# Patient Record
Sex: FEMALE | Race: WHITE | URBAN - METROPOLITAN AREA
[De-identification: names, ages, dates, MRNs, and addresses within clinical notes are randomized per-mention and may not be internally consistent; named-entity substitution may affect disease eponyms.]

---

## 2017-05-02 ENCOUNTER — OUTPATIENT (OUTPATIENT)
Dept: OUTPATIENT SERVICES | Facility: HOSPITAL | Age: 37
LOS: 1 days | Discharge: HOME | End: 2017-05-02

## 2017-06-18 ENCOUNTER — EMERGENCY (EMERGENCY)
Facility: HOSPITAL | Age: 37
LOS: 0 days | Discharge: HOME | End: 2017-06-18

## 2017-06-29 DIAGNOSIS — F41.9 ANXIETY DISORDER, UNSPECIFIED: ICD-10-CM

## 2017-06-29 DIAGNOSIS — Z79.899 OTHER LONG TERM (CURRENT) DRUG THERAPY: ICD-10-CM

## 2017-06-29 DIAGNOSIS — J45.909 UNSPECIFIED ASTHMA, UNCOMPLICATED: ICD-10-CM

## 2017-06-29 DIAGNOSIS — Z98.890 OTHER SPECIFIED POSTPROCEDURAL STATES: ICD-10-CM

## 2017-06-29 DIAGNOSIS — R23.8 OTHER SKIN CHANGES: ICD-10-CM

## 2017-06-29 DIAGNOSIS — L73.9 FOLLICULAR DISORDER, UNSPECIFIED: ICD-10-CM

## 2017-07-12 ENCOUNTER — EMERGENCY (EMERGENCY)
Facility: HOSPITAL | Age: 37
LOS: 0 days | Discharge: HOME | End: 2017-07-12

## 2017-07-12 DIAGNOSIS — F31.9 BIPOLAR DISORDER, UNSPECIFIED: ICD-10-CM

## 2017-07-12 DIAGNOSIS — J45.909 UNSPECIFIED ASTHMA, UNCOMPLICATED: ICD-10-CM
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
## 2017-07-12 DIAGNOSIS — Z87.891 PERSONAL HISTORY OF NICOTINE DEPENDENCE: ICD-10-CM
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
## 2017-07-12 DIAGNOSIS — S42.251A DISPLACED FRACTURE OF GREATER TUBEROSITY OF RIGHT HUMERUS, INITIAL ENCOUNTER FOR CLOSED FRACTURE: ICD-10-CM
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
## 2017-07-12 DIAGNOSIS — Z98.891 HISTORY OF UTERINE SCAR FROM PREVIOUS SURGERY: ICD-10-CM

## 2017-07-24 ENCOUNTER — OUTPATIENT (OUTPATIENT)
Dept: OUTPATIENT SERVICES | Facility: HOSPITAL | Age: 37
LOS: 1 days | Discharge: HOME | End: 2017-07-24

## 2017-07-24 DIAGNOSIS — F31.81 BIPOLAR II DISORDER: ICD-10-CM

## 2017-08-10 ENCOUNTER — OUTPATIENT (OUTPATIENT)
Dept: OUTPATIENT SERVICES | Facility: HOSPITAL | Age: 37
LOS: 1 days | Discharge: HOME | End: 2017-08-10

## 2017-08-10 DIAGNOSIS — F31.81 BIPOLAR II DISORDER: ICD-10-CM

## 2017-09-13 ENCOUNTER — OUTPATIENT (OUTPATIENT)
Dept: OUTPATIENT SERVICES | Facility: HOSPITAL | Age: 37
LOS: 1 days | Discharge: HOME | End: 2017-09-13

## 2017-09-13 DIAGNOSIS — F31.81 BIPOLAR II DISORDER: ICD-10-CM

## 2017-10-16 ENCOUNTER — EMERGENCY (EMERGENCY)
Facility: HOSPITAL | Age: 37
LOS: 0 days | Discharge: HOME | End: 2017-10-16

## 2017-10-16 DIAGNOSIS — N94.9 UNSPECIFIED CONDITION ASSOCIATED WITH FEMALE GENITAL ORGANS AND MENSTRUAL CYCLE: ICD-10-CM

## 2017-10-16 DIAGNOSIS — R50.9 FEVER, UNSPECIFIED: ICD-10-CM

## 2017-10-16 DIAGNOSIS — F31.9 BIPOLAR DISORDER, UNSPECIFIED: ICD-10-CM

## 2017-10-16 DIAGNOSIS — Z98.890 OTHER SPECIFIED POSTPROCEDURAL STATES: ICD-10-CM

## 2017-10-16 DIAGNOSIS — N39.0 URINARY TRACT INFECTION, SITE NOT SPECIFIED: ICD-10-CM

## 2017-10-16 DIAGNOSIS — J45.909 UNSPECIFIED ASTHMA, UNCOMPLICATED: ICD-10-CM

## 2017-10-16 DIAGNOSIS — Z79.899 OTHER LONG TERM (CURRENT) DRUG THERAPY: ICD-10-CM

## 2017-10-30 ENCOUNTER — OUTPATIENT (OUTPATIENT)
Dept: OUTPATIENT SERVICES | Facility: HOSPITAL | Age: 37
LOS: 1 days | Discharge: HOME | End: 2017-10-30

## 2017-10-30 DIAGNOSIS — F31.81 BIPOLAR II DISORDER: ICD-10-CM

## 2017-11-16 ENCOUNTER — OUTPATIENT (OUTPATIENT)
Dept: OUTPATIENT SERVICES | Facility: HOSPITAL | Age: 37
LOS: 1 days | Discharge: HOME | End: 2017-11-16

## 2017-11-16 DIAGNOSIS — F31.81 BIPOLAR II DISORDER: ICD-10-CM

## 2017-12-14 ENCOUNTER — OUTPATIENT (OUTPATIENT)
Dept: OUTPATIENT SERVICES | Facility: HOSPITAL | Age: 37
LOS: 1 days | Discharge: HOME | End: 2017-12-14

## 2017-12-14 DIAGNOSIS — F31.81 BIPOLAR II DISORDER: ICD-10-CM

## 2018-02-05 ENCOUNTER — OUTPATIENT (OUTPATIENT)
Dept: OUTPATIENT SERVICES | Facility: HOSPITAL | Age: 38
LOS: 1 days | Discharge: HOME | End: 2018-02-05

## 2018-02-05 DIAGNOSIS — F31.81 BIPOLAR II DISORDER: ICD-10-CM

## 2018-03-15 ENCOUNTER — OUTPATIENT (OUTPATIENT)
Dept: OUTPATIENT SERVICES | Facility: HOSPITAL | Age: 38
LOS: 1 days | Discharge: HOME | End: 2018-03-15

## 2018-03-15 DIAGNOSIS — F31.81 BIPOLAR II DISORDER: ICD-10-CM

## 2018-03-16 LAB
ALBUMIN SERPL ELPH-MCNC: 4.8 G/DL — SIGNIFICANT CHANGE UP (ref 3.5–5.2)
ALP SERPL-CCNC: 59 U/L — SIGNIFICANT CHANGE UP (ref 30–115)
ALT FLD-CCNC: 14 U/L — SIGNIFICANT CHANGE UP (ref 0–41)
ANION GAP SERPL CALC-SCNC: 14 MMOL/L — SIGNIFICANT CHANGE UP (ref 7–14)
AST SERPL-CCNC: 18 U/L — SIGNIFICANT CHANGE UP (ref 0–41)
BASOPHILS # BLD AUTO: 0.04 K/UL — SIGNIFICANT CHANGE UP (ref 0–0.2)
BASOPHILS NFR BLD AUTO: 0.6 % — SIGNIFICANT CHANGE UP (ref 0–1)
BILIRUB SERPL-MCNC: 0.3 MG/DL — SIGNIFICANT CHANGE UP (ref 0.2–1.2)
BUN SERPL-MCNC: 19 MG/DL — SIGNIFICANT CHANGE UP (ref 10–20)
CALCIUM SERPL-MCNC: 9.1 MG/DL — SIGNIFICANT CHANGE UP (ref 8.5–10.1)
CHLORIDE SERPL-SCNC: 95 MMOL/L — LOW (ref 98–110)
CHOLEST SERPL-MCNC: 202 MG/DL — HIGH (ref 100–200)
CO2 SERPL-SCNC: 27 MMOL/L — SIGNIFICANT CHANGE UP (ref 17–32)
CREAT SERPL-MCNC: 0.8 MG/DL — SIGNIFICANT CHANGE UP (ref 0.7–1.5)
EOSINOPHIL # BLD AUTO: 0.05 K/UL — SIGNIFICANT CHANGE UP (ref 0–0.7)
EOSINOPHIL NFR BLD AUTO: 0.7 % — SIGNIFICANT CHANGE UP (ref 0–8)
ESTIMATED AVERAGE GLUCOSE: 100 MG/DL — SIGNIFICANT CHANGE UP (ref 68–114)
GLUCOSE SERPL-MCNC: 83 MG/DL — SIGNIFICANT CHANGE UP (ref 70–110)
HBA1C BLD-MCNC: 5.1 % — SIGNIFICANT CHANGE UP (ref 4–5.6)
HCT VFR BLD CALC: 40 % — SIGNIFICANT CHANGE UP (ref 37–47)
HDLC SERPL-MCNC: 89 MG/DL — SIGNIFICANT CHANGE UP (ref 40–125)
HGB BLD-MCNC: 13.4 G/DL — SIGNIFICANT CHANGE UP (ref 12–16)
IMM GRANULOCYTES NFR BLD AUTO: 0.4 % — HIGH (ref 0.1–0.3)
LIPID PNL WITH DIRECT LDL SERPL: 103 MG/DL — SIGNIFICANT CHANGE UP (ref 4–129)
LYMPHOCYTES # BLD AUTO: 1.65 K/UL — SIGNIFICANT CHANGE UP (ref 1.2–3.4)
LYMPHOCYTES # BLD AUTO: 23.5 % — SIGNIFICANT CHANGE UP (ref 20.5–51.1)
MCHC RBC-ENTMCNC: 30.8 PG — SIGNIFICANT CHANGE UP (ref 27–31)
MCHC RBC-ENTMCNC: 33.5 G/DL — SIGNIFICANT CHANGE UP (ref 32–37)
MCV RBC AUTO: 92 FL — SIGNIFICANT CHANGE UP (ref 81–99)
MONOCYTES # BLD AUTO: 0.46 K/UL — SIGNIFICANT CHANGE UP (ref 0.1–0.6)
MONOCYTES NFR BLD AUTO: 6.6 % — SIGNIFICANT CHANGE UP (ref 1.7–9.3)
NEUTROPHILS # BLD AUTO: 4.79 K/UL — SIGNIFICANT CHANGE UP (ref 1.4–6.5)
NEUTROPHILS NFR BLD AUTO: 68.2 % — SIGNIFICANT CHANGE UP (ref 42.2–75.2)
NRBC # BLD: 0 /100 WBCS — SIGNIFICANT CHANGE UP (ref 0–0)
PLATELET # BLD AUTO: 220 K/UL — SIGNIFICANT CHANGE UP (ref 130–400)
POTASSIUM SERPL-MCNC: 4.3 MMOL/L — SIGNIFICANT CHANGE UP (ref 3.5–5)
POTASSIUM SERPL-SCNC: 4.3 MMOL/L — SIGNIFICANT CHANGE UP (ref 3.5–5)
PROT SERPL-MCNC: 7.2 G/DL — SIGNIFICANT CHANGE UP (ref 6–8)
RBC # BLD: 4.35 M/UL — SIGNIFICANT CHANGE UP (ref 4.2–5.4)
RBC # FLD: 12.1 % — SIGNIFICANT CHANGE UP (ref 11.5–14.5)
SODIUM SERPL-SCNC: 136 MMOL/L — SIGNIFICANT CHANGE UP (ref 135–146)
TOTAL CHOLESTEROL/HDL RATIO MEASUREMENT: 2.3 RATIO — LOW (ref 4–5.5)
TRIGL SERPL-MCNC: 98 MG/DL — SIGNIFICANT CHANGE UP (ref 10–149)
VALPROATE SERPL-MCNC: 53 UG/ML — SIGNIFICANT CHANGE UP (ref 50–100)
WBC # BLD: 7.02 K/UL — SIGNIFICANT CHANGE UP (ref 4.8–10.8)
WBC # FLD AUTO: 7.02 K/UL — SIGNIFICANT CHANGE UP (ref 4.8–10.8)

## 2018-03-17 LAB — TSH SERPL-MCNC: 1.97 UIU/ML — SIGNIFICANT CHANGE UP (ref 0.27–4.2)

## 2018-04-13 ENCOUNTER — OUTPATIENT (OUTPATIENT)
Dept: OUTPATIENT SERVICES | Facility: HOSPITAL | Age: 38
LOS: 1 days | Discharge: HOME | End: 2018-04-13

## 2018-04-13 DIAGNOSIS — F31.81 BIPOLAR II DISORDER: ICD-10-CM

## 2018-05-07 ENCOUNTER — OUTPATIENT (OUTPATIENT)
Dept: OUTPATIENT SERVICES | Facility: HOSPITAL | Age: 38
LOS: 1 days | Discharge: HOME | End: 2018-05-07

## 2018-05-07 DIAGNOSIS — F31.81 BIPOLAR II DISORDER: ICD-10-CM

## 2018-05-09 ENCOUNTER — OUTPATIENT (OUTPATIENT)
Dept: OUTPATIENT SERVICES | Facility: HOSPITAL | Age: 38
LOS: 1 days | Discharge: HOME | End: 2018-05-09

## 2018-05-09 DIAGNOSIS — F31.81 BIPOLAR II DISORDER: ICD-10-CM

## 2018-06-11 ENCOUNTER — OUTPATIENT (OUTPATIENT)
Dept: OUTPATIENT SERVICES | Facility: HOSPITAL | Age: 38
LOS: 1 days | Discharge: HOME | End: 2018-06-11

## 2018-06-11 DIAGNOSIS — F31.81 BIPOLAR II DISORDER: ICD-10-CM

## 2018-06-12 ENCOUNTER — EMERGENCY (EMERGENCY)
Facility: HOSPITAL | Age: 38
LOS: 0 days | Discharge: HOME | End: 2018-06-12
Attending: EMERGENCY MEDICINE | Admitting: EMERGENCY MEDICINE

## 2018-06-12 VITALS
SYSTOLIC BLOOD PRESSURE: 99 MMHG | RESPIRATION RATE: 18 BRPM | TEMPERATURE: 98 F | HEART RATE: 114 BPM | DIASTOLIC BLOOD PRESSURE: 64 MMHG | OXYGEN SATURATION: 98 %

## 2018-06-12 VITALS
OXYGEN SATURATION: 98 % | HEART RATE: 83 BPM | RESPIRATION RATE: 18 BRPM | SYSTOLIC BLOOD PRESSURE: 118 MMHG | DIASTOLIC BLOOD PRESSURE: 88 MMHG | TEMPERATURE: 98 F
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
## 2018-06-12 DIAGNOSIS — F10.129 ALCOHOL ABUSE WITH INTOXICATION, UNSPECIFIED: ICD-10-CM
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
## 2018-06-12 DIAGNOSIS — F31.9 BIPOLAR DISORDER, UNSPECIFIED: ICD-10-CM

## 2018-06-12 LAB
ALBUMIN SERPL ELPH-MCNC: 4.4 G/DL — SIGNIFICANT CHANGE UP (ref 3.5–5.2)
ALP SERPL-CCNC: 53 U/L — SIGNIFICANT CHANGE UP (ref 30–115)
ALT FLD-CCNC: 20 U/L — SIGNIFICANT CHANGE UP (ref 0–41)
ANION GAP SERPL CALC-SCNC: 14 MMOL/L — SIGNIFICANT CHANGE UP (ref 7–14)
APAP SERPL-MCNC: <5 UG/ML — LOW (ref 10–30)
AST SERPL-CCNC: 19 U/L — SIGNIFICANT CHANGE UP (ref 0–41)
BASOPHILS # BLD AUTO: 0.03 K/UL — SIGNIFICANT CHANGE UP (ref 0–0.2)
BASOPHILS NFR BLD AUTO: 0.5 % — SIGNIFICANT CHANGE UP (ref 0–1)
BILIRUB SERPL-MCNC: <0.2 MG/DL — SIGNIFICANT CHANGE UP (ref 0.2–1.2)
BUN SERPL-MCNC: 13 MG/DL — SIGNIFICANT CHANGE UP (ref 10–20)
CALCIUM SERPL-MCNC: 9.2 MG/DL — SIGNIFICANT CHANGE UP (ref 8.5–10.1)
CHLORIDE SERPL-SCNC: 103 MMOL/L — SIGNIFICANT CHANGE UP (ref 98–110)
CK SERPL-CCNC: 129 U/L — SIGNIFICANT CHANGE UP (ref 0–225)
CO2 SERPL-SCNC: 26 MMOL/L — SIGNIFICANT CHANGE UP (ref 17–32)
CREAT SERPL-MCNC: 0.9 MG/DL — SIGNIFICANT CHANGE UP (ref 0.7–1.5)
EOSINOPHIL # BLD AUTO: 0.08 K/UL — SIGNIFICANT CHANGE UP (ref 0–0.7)
EOSINOPHIL NFR BLD AUTO: 1.2 % — SIGNIFICANT CHANGE UP (ref 0–8)
ETHANOL SERPL-MCNC: 100 MG/DL — HIGH
GLUCOSE SERPL-MCNC: 87 MG/DL — SIGNIFICANT CHANGE UP (ref 70–99)
HCG SERPL-ACNC: <0.6 MIU/ML — SIGNIFICANT CHANGE UP (ref 0–5)
HCT VFR BLD CALC: 38.3 % — SIGNIFICANT CHANGE UP (ref 37–47)
HGB BLD-MCNC: 13.4 G/DL — SIGNIFICANT CHANGE UP (ref 12–16)
IMM GRANULOCYTES NFR BLD AUTO: 0.6 % — HIGH (ref 0.1–0.3)
LITHIUM SERPL-MCNC: 0.18 MMOL/L — LOW (ref 0.6–1.2)
LYMPHOCYTES # BLD AUTO: 1.78 K/UL — SIGNIFICANT CHANGE UP (ref 1.2–3.4)
LYMPHOCYTES # BLD AUTO: 26.8 % — SIGNIFICANT CHANGE UP (ref 20.5–51.1)
MCHC RBC-ENTMCNC: 31.7 PG — HIGH (ref 27–31)
MCHC RBC-ENTMCNC: 35 G/DL — SIGNIFICANT CHANGE UP (ref 32–37)
MCV RBC AUTO: 90.5 FL — SIGNIFICANT CHANGE UP (ref 81–99)
MONOCYTES # BLD AUTO: 0.58 K/UL — SIGNIFICANT CHANGE UP (ref 0.1–0.6)
MONOCYTES NFR BLD AUTO: 8.7 % — SIGNIFICANT CHANGE UP (ref 1.7–9.3)
NEUTROPHILS # BLD AUTO: 4.12 K/UL — SIGNIFICANT CHANGE UP (ref 1.4–6.5)
NEUTROPHILS NFR BLD AUTO: 62.2 % — SIGNIFICANT CHANGE UP (ref 42.2–75.2)
NRBC # BLD: 0 /100 WBCS — SIGNIFICANT CHANGE UP (ref 0–0)
PLATELET # BLD AUTO: 236 K/UL — SIGNIFICANT CHANGE UP (ref 130–400)
POTASSIUM SERPL-MCNC: 3.9 MMOL/L — SIGNIFICANT CHANGE UP (ref 3.5–5)
POTASSIUM SERPL-SCNC: 3.9 MMOL/L — SIGNIFICANT CHANGE UP (ref 3.5–5)
PROT SERPL-MCNC: 7.2 G/DL — SIGNIFICANT CHANGE UP (ref 6–8)
RBC # BLD: 4.23 M/UL — SIGNIFICANT CHANGE UP (ref 4.2–5.4)
RBC # FLD: 12 % — SIGNIFICANT CHANGE UP (ref 11.5–14.5)
SALICYLATES SERPL-MCNC: <0.3 MG/DL — LOW (ref 4–30)
SODIUM SERPL-SCNC: 143 MMOL/L — SIGNIFICANT CHANGE UP (ref 135–146)
TROPONIN T SERPL-MCNC: <0.01 NG/ML — SIGNIFICANT CHANGE UP
WBC # BLD: 6.63 K/UL — SIGNIFICANT CHANGE UP (ref 4.8–10.8)
WBC # FLD AUTO: 6.63 K/UL — SIGNIFICANT CHANGE UP (ref 4.8–10.8)

## 2018-06-12 NOTE — ED PROVIDER NOTE - PHYSICAL EXAMINATION
VITAL SIGNS: I have reviewed nursing notes and confirm.  CONSTITUTIONAL: Well-developed;   SKIN: skin exam is warm and dry, no acute rash.   HEAD: Normocephalic; atraumatic.  EYES:  EOM intact; conjunctiva and sclera clear.  ENT: No nasal discharge; airway clear. moist oral mucosa;   NECK: Supple; non tender.  CARD: S1, S2 normal; no murmurs, gallops, or rubs. Regular rate and rhythm. posterior tibial and radial pulses 2+  RESP: No wheezes, rales or rhonchi. cta b/l. no use of accessory muscles. no retractions  ABD: Normal bowel sounds; soft; non-distended; non-tender;   EXT: Normal ROM. No  cyanosis or edema.  NEURO: Alert-- wakes up-- answer-- pt intoxicated
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
## 2018-06-12 NOTE — ED PROVIDER NOTE - NS ED ROS FT
ROS: No fever/chills, No headache/photophobia/eye pain/changes in vision, No ear pain/sore throat/dysphagia, No chest pain/palpitations, no SOB/cough/wheeze/stridor, No abdominal pain, No N/V/D/melena, no dysuria/frequency/discharge, No neck/back pain, no rash, no changes in neurological status/function.    -limited by pt intoxication
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
## 2018-06-14 ENCOUNTER — EMERGENCY (EMERGENCY)
Facility: HOSPITAL | Age: 38
LOS: 1 days | Discharge: LEFT BEFORE DISCHARGE | End: 2018-06-14
Attending: EMERGENCY MEDICINE

## 2018-06-14 VITALS
RESPIRATION RATE: 19 BRPM | DIASTOLIC BLOOD PRESSURE: 89 MMHG | WEIGHT: 175.05 LBS | SYSTOLIC BLOOD PRESSURE: 130 MMHG | TEMPERATURE: 98 F | OXYGEN SATURATION: 100 % | HEART RATE: 81 BPM | HEIGHT: 63 IN
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
## 2018-06-20 DIAGNOSIS — N64.4 MASTODYNIA: ICD-10-CM

## 2018-06-20 DIAGNOSIS — K14.6 GLOSSODYNIA: ICD-10-CM

## 2018-07-23 ENCOUNTER — OUTPATIENT (OUTPATIENT)
Dept: OUTPATIENT SERVICES | Facility: HOSPITAL | Age: 38
LOS: 1 days | Discharge: HOME | End: 2018-07-23

## 2018-07-23 DIAGNOSIS — F31.81 BIPOLAR II DISORDER: ICD-10-CM

## 2018-08-01 ENCOUNTER — OUTPATIENT (OUTPATIENT)
Dept: OUTPATIENT SERVICES | Facility: HOSPITAL | Age: 38
LOS: 1 days | Discharge: HOME | End: 2018-08-01

## 2018-08-01 DIAGNOSIS — F31.81 BIPOLAR II DISORDER: ICD-10-CM

## 2018-08-23 ENCOUNTER — EMERGENCY (EMERGENCY)
Facility: HOSPITAL | Age: 38
LOS: 0 days | Discharge: HOME | End: 2018-08-23
Attending: EMERGENCY MEDICINE | Admitting: EMERGENCY MEDICINE

## 2018-08-23 VITALS
OXYGEN SATURATION: 97 % | SYSTOLIC BLOOD PRESSURE: 131 MMHG | HEIGHT: 63 IN | RESPIRATION RATE: 16 BRPM | WEIGHT: 164.91 LBS | HEART RATE: 92 BPM | TEMPERATURE: 98 F | DIASTOLIC BLOOD PRESSURE: 91 MMHG

## 2018-08-23 DIAGNOSIS — Z79.1 LONG TERM (CURRENT) USE OF NON-STEROIDAL ANTI-INFLAMMATORIES (NSAID): ICD-10-CM
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
## 2018-08-23 DIAGNOSIS — S80.212A ABRASION, LEFT KNEE, INITIAL ENCOUNTER: ICD-10-CM

## 2018-08-23 DIAGNOSIS — S52.122A DISPLACED FRACTURE OF HEAD OF LEFT RADIUS, INITIAL ENCOUNTER FOR CLOSED FRACTURE: ICD-10-CM
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
## 2018-08-23 DIAGNOSIS — Z98.891 HISTORY OF UTERINE SCAR FROM PREVIOUS SURGERY: Chronic | ICD-10-CM

## 2018-08-23 DIAGNOSIS — Z87.39 PERSONAL HISTORY OF OTHER DISEASES OF THE MUSCULOSKELETAL SYSTEM AND CONNECTIVE TISSUE: ICD-10-CM

## 2018-08-23 DIAGNOSIS — S80.02XA CONTUSION OF LEFT KNEE, INITIAL ENCOUNTER: ICD-10-CM

## 2018-08-23 DIAGNOSIS — Z79.891 LONG TERM (CURRENT) USE OF OPIATE ANALGESIC: ICD-10-CM
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
## 2018-08-23 DIAGNOSIS — Z98.890 OTHER SPECIFIED POSTPROCEDURAL STATES: ICD-10-CM

## 2018-08-23 DIAGNOSIS — M25.522 PAIN IN LEFT ELBOW: ICD-10-CM
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
## 2018-08-23 RX ORDER — OXYCODONE AND ACETAMINOPHEN 5; 325 MG/1; MG/1
1 TABLET ORAL ONCE
Qty: 0 | Refills: 0 | Status: DISCONTINUED | OUTPATIENT
Start: 2018-08-23 | End: 2018-08-23

## 2018-08-23 RX ORDER — IBUPROFEN 200 MG
600 TABLET ORAL ONCE
Qty: 0 | Refills: 0 | Status: COMPLETED | OUTPATIENT
Start: 2018-08-23 | End: 2018-08-23

## 2018-08-23 RX ORDER — IBUPROFEN 200 MG
1 TABLET ORAL
Qty: 15 | Refills: 0 | OUTPATIENT
Start: 2018-08-23 | End: 2018-08-27

## 2018-08-23 RX ADMIN — OXYCODONE AND ACETAMINOPHEN 1 TABLET(S): 5; 325 TABLET ORAL at 15:00

## 2018-08-23 RX ADMIN — Medication 600 MILLIGRAM(S): at 14:59

## 2018-08-23 NOTE — ED PROVIDER NOTE - NS ED ROS FT
Eyes:  No visual changes, eye pain or discharge.  ENMT:  No hearing changes, pain, no sore throat or runny nose, no difficulty swallowing  Cardiac:  No chest pain, SOB or edema. No chest pain with exertion.  Respiratory:  No cough or respiratory distress. No hemoptysis. No history of asthma or RAD.  GI:  No nausea, vomiting, diarrhea or abdominal pain.  :  No dysuria, frequency or burning.  MS:  No muscle weakness, or back pain. + joint pain  Neuro:  No headache or weakness.  No LOC.  Skin:  No skin rash. + bruising, abrasions  Endocrine: No history of thyroid disease or diabetes.

## 2018-08-23 NOTE — ED PROVIDER NOTE - PHYSICAL EXAMINATION
CONSTITUTIONAL: Well-developed; well-nourished; in no acute distress.   SKIN: superficial abrasions to the anterior surface of knee. 2cm x 3cm ecchymosis to the posterior aspect of L. distal arm. swelling of the proximal aspect of L. proximal forearm.  HEAD: Normocephalic; atraumatic.  NECK: Supple; non tender.  CARD: S1, S2 normal; no murmurs, gallops, or rubs. Regular rate and rhythm. 2+ pulses present in B/L extremities.  RESP: No wheezes, rales or rhonchi.  ABD: soft ntnd  EXT:  No clubbing, cyanosis or edema. Tenderness to palpation of the L. distal arm, L. elbow, and L. proximal forearm. Limited flexion and extension of the L. elbow. FROM of B/L distal LE with 5/5 strength in B/L Knee, ankle, and foot.  NEURO: Alert, oriented, grossly unremarkable  PSYCH: Cooperative, appropriate.

## 2018-08-23 NOTE — ED ADULT NURSE NOTE - NSIMPLEMENTINTERV_GEN_ALL_ED
Implemented All Universal Safety Interventions:  [place] to call system. Call bell, personal items and telephone within reach. Instruct patient to call for assistance. Room bathroom lighting operational. Non-slip footwear when patient is off stretcher. Physically safe environment: no spills, clutter or unnecessary equipment. Stretcher in lowest position, wheels locked, appropriate side rails in place.

## 2018-08-23 NOTE — ED PROVIDER NOTE - ATTENDING CONTRIBUTION TO CARE
I personally evaluated patient. I agree with the findings and plan with all documentation on chart except as documented  in my note.    s/p fall 2 days ago with arm pain and a scrape to the knee.  Good pulses and normal warmth.  + Tenderness to elbow and medial upper arm with bruising.  Physical exam and x-ray concerning for a radial head fracture.  Patient casted and put in sling.  Will refer to Orthopedics (she sees Dr. Pizano and is going to him tomorrow).  Cast care discussed.    Full DC instructions discussed and patient knows when to seek immediate medical attention.  Patient has proper follow up.  All results discussed and patient aware they may require further work up.  Proper follow up ensured. Limitations of ED work up discussed.  Medications administered and prescribed/OTC home meds discussed.  All questions and concerns from patient or family addressed. Understanding of instructions verbalized.

## 2018-08-23 NOTE — ED PROVIDER NOTE - OBJECTIVE STATEMENT
38yo Female presents with L. elbow pain after falling 2 days ago. Pt states that she tripped and fell on a pothole and landed on her L. arm in order to protect her head. She immediately felt a pop and severe pain. Denies LOC. She has tried symptomatic treatment with ibuprofen and tylenol but states that the pain has not subsided. Endorses numbness, tingling of the affected limb. Denies headaches.
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
## 2018-08-23 NOTE — ED PROVIDER NOTE - MEDICAL DECISION MAKING DETAILS
s/p fall 2 days ago with arm pain and a scrape to the knee.  Good pulses and normal warmth.  + Tenderness to elbow and medial upper arm with bruising.  Physical exam and x-ray concerning for a radial head fracture.  Patient casted and put in sling.  Will refer to Orthopedics (she sees Dr. Pizano and is going to him tomorrow).  Cast care discussed. meds discussed and signs/symptoms of compartment syndrome or issues with cast discussed.

## 2018-08-27 ENCOUNTER — OUTPATIENT (OUTPATIENT)
Dept: OUTPATIENT SERVICES | Facility: HOSPITAL | Age: 38
LOS: 1 days | Discharge: HOME | End: 2018-08-27

## 2018-08-27 VITALS
OXYGEN SATURATION: 100 % | RESPIRATION RATE: 20 BRPM | WEIGHT: 175.05 LBS | TEMPERATURE: 98 F | SYSTOLIC BLOOD PRESSURE: 115 MMHG | HEIGHT: 63 IN | DIASTOLIC BLOOD PRESSURE: 73 MMHG | HEART RATE: 65 BPM

## 2018-08-27 DIAGNOSIS — Z33.2 ENCOUNTER FOR ELECTIVE TERMINATION OF PREGNANCY: Chronic | ICD-10-CM

## 2018-08-27 DIAGNOSIS — Z98.891 HISTORY OF UTERINE SCAR FROM PREVIOUS SURGERY: Chronic | ICD-10-CM

## 2018-08-27 DIAGNOSIS — Z01.818 ENCOUNTER FOR OTHER PREPROCEDURAL EXAMINATION: ICD-10-CM

## 2018-08-27 DIAGNOSIS — Z98.890 OTHER SPECIFIED POSTPROCEDURAL STATES: Chronic | ICD-10-CM

## 2018-08-27 DIAGNOSIS — S52.132A DISPLACED FRACTURE OF NECK OF LEFT RADIUS, INITIAL ENCOUNTER FOR CLOSED FRACTURE: ICD-10-CM

## 2018-08-27 LAB
APPEARANCE UR: CLEAR — SIGNIFICANT CHANGE UP
APTT BLD: 33.6 SEC — SIGNIFICANT CHANGE UP (ref 27–39.2)
BILIRUB UR-MCNC: NEGATIVE — SIGNIFICANT CHANGE UP
BLD GP AB SCN SERPL QL: SIGNIFICANT CHANGE UP
COLOR SPEC: YELLOW — SIGNIFICANT CHANGE UP
DIFF PNL FLD: NEGATIVE — SIGNIFICANT CHANGE UP
GLUCOSE UR QL: NEGATIVE MG/DL — SIGNIFICANT CHANGE UP
HCG SERPL-ACNC: <0.6 MIU/ML — SIGNIFICANT CHANGE UP
INR BLD: 0.96 RATIO — SIGNIFICANT CHANGE UP (ref 0.65–1.3)
KETONES UR-MCNC: NEGATIVE — SIGNIFICANT CHANGE UP
LEUKOCYTE ESTERASE UR-ACNC: NEGATIVE — SIGNIFICANT CHANGE UP
NITRITE UR-MCNC: NEGATIVE — SIGNIFICANT CHANGE UP
PH UR: 6 — SIGNIFICANT CHANGE UP (ref 5–8)
PROT UR-MCNC: NEGATIVE MG/DL — SIGNIFICANT CHANGE UP
PROTHROM AB SERPL-ACNC: 10.4 SEC — SIGNIFICANT CHANGE UP (ref 9.95–12.87)
SP GR SPEC: 1.02 — SIGNIFICANT CHANGE UP (ref 1.01–1.03)
TYPE + AB SCN PNL BLD: SIGNIFICANT CHANGE UP
UROBILINOGEN FLD QL: 0.2 MG/DL — SIGNIFICANT CHANGE UP (ref 0.2–0.2)

## 2018-08-27 NOTE — H&P PST ADULT - PSH
Fetal death from pregnancy termination  x4-5  History of  delivery    History of surgery  carpal tunnel release x2  last 5 y ago

## 2018-08-27 NOTE — H&P PST ADULT - EXTREMITIES COMMENTS
left arm with ace wrap surrounding elbow  fingers mobile, warm  + radial pulse, a slight edema  bruising from upper arm to hand  " pt states I removed splint it was pinching me" left arm with ace wrap surrounding elbow  fingers mobile, warm  + radial pulse, a slight edema  bruising from upper arm to hand  " pt states I removed splint it was pinching me"   left knee scab
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
## 2018-08-29 ENCOUNTER — OUTPATIENT (OUTPATIENT)
Dept: OUTPATIENT SERVICES | Facility: HOSPITAL | Age: 38
LOS: 1 days | Discharge: HOME | End: 2018-08-29

## 2018-08-29 ENCOUNTER — RESULT REVIEW (OUTPATIENT)
Age: 38
End: 2018-08-29

## 2018-08-29 VITALS
HEIGHT: 63 IN | OXYGEN SATURATION: 99 % | WEIGHT: 175.05 LBS | TEMPERATURE: 98 F | HEART RATE: 58 BPM | RESPIRATION RATE: 18 BRPM | SYSTOLIC BLOOD PRESSURE: 107 MMHG | DIASTOLIC BLOOD PRESSURE: 73 MMHG

## 2018-08-29 VITALS
RESPIRATION RATE: 18 BRPM | OXYGEN SATURATION: 99 % | SYSTOLIC BLOOD PRESSURE: 106 MMHG | HEART RATE: 58 BPM | DIASTOLIC BLOOD PRESSURE: 63 MMHG

## 2018-08-29 DIAGNOSIS — Z33.2 ENCOUNTER FOR ELECTIVE TERMINATION OF PREGNANCY: Chronic | ICD-10-CM

## 2018-08-29 DIAGNOSIS — Z98.891 HISTORY OF UTERINE SCAR FROM PREVIOUS SURGERY: Chronic | ICD-10-CM

## 2018-08-29 DIAGNOSIS — Z98.890 OTHER SPECIFIED POSTPROCEDURAL STATES: Chronic | ICD-10-CM

## 2018-08-29 RX ORDER — ONDANSETRON 8 MG/1
4 TABLET, FILM COATED ORAL ONCE
Qty: 0 | Refills: 0 | Status: DISCONTINUED | OUTPATIENT
Start: 2018-08-29 | End: 2018-09-13

## 2018-08-29 RX ORDER — MORPHINE SULFATE 50 MG/1
2 CAPSULE, EXTENDED RELEASE ORAL
Qty: 0 | Refills: 0 | Status: DISCONTINUED | OUTPATIENT
Start: 2018-08-29 | End: 2018-08-29

## 2018-08-29 RX ORDER — OXYCODONE AND ACETAMINOPHEN 5; 325 MG/1; MG/1
1 TABLET ORAL ONCE
Qty: 0 | Refills: 0 | Status: DISCONTINUED | OUTPATIENT
Start: 2018-08-29 | End: 2018-08-29

## 2018-08-29 RX ORDER — HYDROMORPHONE HYDROCHLORIDE 2 MG/ML
0.5 INJECTION INTRAMUSCULAR; INTRAVENOUS; SUBCUTANEOUS
Qty: 0 | Refills: 0 | Status: DISCONTINUED | OUTPATIENT
Start: 2018-08-29 | End: 2018-08-29

## 2018-08-29 RX ORDER — SODIUM CHLORIDE 9 MG/ML
1000 INJECTION, SOLUTION INTRAVENOUS
Qty: 0 | Refills: 0 | Status: DISCONTINUED | OUTPATIENT
Start: 2018-08-29 | End: 2018-09-13

## 2018-08-29 RX ADMIN — SODIUM CHLORIDE 100 MILLILITER(S): 9 INJECTION, SOLUTION INTRAVENOUS at 12:43
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
## 2018-08-29 NOTE — PRE-ANESTHESIA EVALUATION ADULT - NSANTHOSAYNRD_GEN_A_CORE
No. CHRISTIE screening performed.  STOP BANG Legend: 0-2 = LOW Risk; 3-4 = INTERMEDIATE Risk; 5-8 = HIGH Risk

## 2018-08-29 NOTE — ASU DISCHARGE PLAN (ADULT/PEDIATRIC). - SPECIAL INSTRUCTIONS
DIET:    Resume normal diet  No alcoholic  beverages for 24 hours or if on prescribed narcotic pain medications.    MEDICATION:    Resume your preoperative oral medications.  Check with your physician before starting aspirin, Coumadin, or other blood thinners.  Prescriptions given to you - take as directed.      ACTIVITY:    Rest today and limit your activities for 24 hours.  Do not drive or operate machinery for 24 hours - if you received anesthesia.  When taking pain medication, be careful as you walk or climb stairs.  It is not advisable to drive while taking prescribed pain medication.    SPECIAL INSTRUCTIONS:    __x___ Elevate operative site above heart level or as directed.  __x___ Apply ice to operative site as directed.  _x____ Use  sling as directed.  __x___ Exercise fingers.    DRESSING CARE:    _____ You may change the dressing 4 days. Keep wound covered with band-aids.  __x___ Do not change the dressing until your doctor see you.  _____ You can loosen or rewrap the dressing.  _____  Keep dressing clean and dry.  _____ You may shower in _____ day(s) with the extremity covered by a plastic bag.  _____ OK to wash hand , including showers, in _____ day(s).    ADDITIONAL  INFORMATION:    Post operative visit should be scheduled for next week.  If you are not aware of visit please contact office.  If you have any questions or concerns call office at      Notify your doctor if you develop   Fever  Excessive Swelling  Chills   Drainage   Pain not controlled by medication  Persistent numbness in hand or fingers    If an Emergency arises call 911 and/or go to the Emergency Room

## 2018-08-29 NOTE — BRIEF OPERATIVE NOTE - PROCEDURE
<<-----Click on this checkbox to enter Procedure Replacement of head of left radius  08/29/2018    Active  AMEE

## 2018-08-29 NOTE — PACU DISCHARGE NOTE - COMMENTS
Pt now SP left supraclavicular block with supplemental IV sedation without any complication. See anesthesia record for PACU admission vital signs.

## 2018-09-02 LAB — SURGICAL PATHOLOGY STUDY: SIGNIFICANT CHANGE UP

## 2018-09-03 DIAGNOSIS — G47.00 INSOMNIA, UNSPECIFIED: ICD-10-CM

## 2018-09-03 DIAGNOSIS — S52.122A DISPLACED FRACTURE OF HEAD OF LEFT RADIUS, INITIAL ENCOUNTER FOR CLOSED FRACTURE: ICD-10-CM

## 2018-09-03 DIAGNOSIS — F31.9 BIPOLAR DISORDER, UNSPECIFIED: ICD-10-CM

## 2018-09-03 DIAGNOSIS — Z87.891 PERSONAL HISTORY OF NICOTINE DEPENDENCE: ICD-10-CM

## 2018-09-03 DIAGNOSIS — M19.90 UNSPECIFIED OSTEOARTHRITIS, UNSPECIFIED SITE: ICD-10-CM
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
## 2018-09-11 PROBLEM — F31.9 BIPOLAR DISORDER, UNSPECIFIED: Chronic | Status: ACTIVE | Noted: 2018-08-27

## 2018-09-11 PROBLEM — M19.90 UNSPECIFIED OSTEOARTHRITIS, UNSPECIFIED SITE: Chronic | Status: ACTIVE | Noted: 2018-08-23

## 2018-09-11 PROBLEM — G47.00 INSOMNIA, UNSPECIFIED: Chronic | Status: ACTIVE | Noted: 2018-08-27

## 2019-01-03 ENCOUNTER — EMERGENCY (EMERGENCY)
Facility: HOSPITAL | Age: 39
LOS: 0 days | Discharge: HOME | End: 2019-01-03
Attending: EMERGENCY MEDICINE | Admitting: EMERGENCY MEDICINE

## 2019-01-03 VITALS
HEART RATE: 69 BPM | WEIGHT: 164.91 LBS | HEIGHT: 63 IN | TEMPERATURE: 98 F | DIASTOLIC BLOOD PRESSURE: 71 MMHG | SYSTOLIC BLOOD PRESSURE: 108 MMHG | RESPIRATION RATE: 18 BRPM | OXYGEN SATURATION: 98 %

## 2019-01-03 DIAGNOSIS — Z98.890 OTHER SPECIFIED POSTPROCEDURAL STATES: Chronic | ICD-10-CM

## 2019-01-03 DIAGNOSIS — F90.9 ATTENTION-DEFICIT HYPERACTIVITY DISORDER, UNSPECIFIED TYPE: ICD-10-CM

## 2019-01-03 DIAGNOSIS — Z98.890 OTHER SPECIFIED POSTPROCEDURAL STATES: ICD-10-CM

## 2019-01-03 DIAGNOSIS — Z79.899 OTHER LONG TERM (CURRENT) DRUG THERAPY: ICD-10-CM

## 2019-01-03 DIAGNOSIS — Z88.8 ALLERGY STATUS TO OTHER DRUGS, MEDICAMENTS AND BIOLOGICAL SUBSTANCES: ICD-10-CM

## 2019-01-03 DIAGNOSIS — F31.9 BIPOLAR DISORDER, UNSPECIFIED: ICD-10-CM

## 2019-01-03 DIAGNOSIS — Z33.2 ENCOUNTER FOR ELECTIVE TERMINATION OF PREGNANCY: Chronic | ICD-10-CM

## 2019-01-03 DIAGNOSIS — Z98.891 HISTORY OF UTERINE SCAR FROM PREVIOUS SURGERY: Chronic | ICD-10-CM

## 2019-01-03 DIAGNOSIS — Z79.891 LONG TERM (CURRENT) USE OF OPIATE ANALGESIC: ICD-10-CM

## 2019-01-03 DIAGNOSIS — Z76.0 ENCOUNTER FOR ISSUE OF REPEAT PRESCRIPTION: ICD-10-CM

## 2019-01-03 RX ORDER — IBUPROFEN 200 MG
0 TABLET ORAL
Qty: 0 | Refills: 0 | COMMUNITY
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
## 2019-01-03 NOTE — ED PROVIDER NOTE - NS ED ROS FT
Review of Systems    Constitutional: (-) fever  Cardiovascular: (-) chest pain, (-) palpitation   Respiratory: (-) cough, (-) shortness of breath  Gastrointestinal: (-) abdominal pain (-) vomiting, (-) diarrhea  Musculoskeletal: (-) neck pain, (-) back pain  Integumentary: (-) rash, (-) edema  Neurological: (-) headache, (-) altered mental status

## 2019-01-03 NOTE — ED ADULT NURSE NOTE - CHPI ED NUR SYMPTOMS NEG
no tingling/no vomiting/no weakness/no fever/no dizziness/no nausea/no decreased eating/drinking/no pain/no chills

## 2019-01-03 NOTE — ED PROVIDER NOTE - ATTENDING CONTRIBUTION TO CARE
Pt with above PMH presents for medication refill, no SI/HI/hallucinations, seen by psych and cleared for discharge to f/u with her own psychiatrist for refills

## 2019-01-03 NOTE — ED PROVIDER NOTE - NSFOLLOWUPCLINICS_GEN_ALL_ED_FT
[facility]  [facility]  [address] [place] [id number]  Phone: [phone]  Fax:   Follow Up Time:

## 2019-01-03 NOTE — ED BEHAVIORAL HEALTH NOTE - BEHAVIORAL HEALTH NOTE
CC: "I need my meds"    HPI: 38 SWF rocio olson by report of "ADH, bipolar, manic" presents as she presented today at [facility] but learned that her case was being closed for missed appointments. She became frustrated as she was hoping to get refill of meds of Ambien and Adderall which she has been taking for several years. Pt was told by [facility] managerial staff to present to ER for an assessment and for refill of meds. Pt has been feeling well other than wanting refill of meds.     PPHx: Pt denies past IPP, SA,     Social: lives w parents, unemployed, denies legal or  hx, no gun access, single with no children, +tobacco, records indicate past use of opiates and agonist therapy    MSE: Logical, linear speech, alert and oriented x 4, engaged, good eye contact, well groomed, no abnl mvt, no psychosis, no SI    Dx: Substance use disorder    Formulation/Plan: Pt informed that meds such as sedative hypnotics and stimulants not given from ER due to abuse potential. Pt frustrated with this answer but was pleased that she would be getting documentation that she had been in ER to show to [facility] staff to indicate that she had followed instructions which she hoped would expedite her case being reopened so that she could be prescribed these meds. No SI, no need for IPP, not an acute risk to self or others. Cleared for dc from ER pending medical clearance.

## 2019-01-03 NOTE — ED PROVIDER NOTE - PROGRESS NOTE DETAILS
Psychiatry evaluated and cleared pt for discharge. Patient to be discharged from ED. Any available test results were discussed with patient and/or family. Verbal instructions given, including instructions to return to ED immediately for any new, worsening, or concerning symptoms. Patient endorsed understanding. Written discharge instructions additionally given, including follow-up plan.

## 2019-01-03 NOTE — ED PROVIDER NOTE - OBJECTIVE STATEMENT
37 yo f w pmhx sig for bipolar disorder and ADHD reports for psychiatric evaluation to refill Li and Adoral; denies homicidal and suicidal ideations. Pt denies feeling out of control, auditory or visual hallucinations. Denies CP, SOB, palpitations, abd pain, n/v.    I have reviewed available current nursing and previous documentation of past medical, surgical, family, and/or social history.

## 2019-01-03 NOTE — ED PROVIDER NOTE - PHYSICAL EXAMINATION
Physical Exam    Vital Signs: I have reviewed the initial vital signs.  Constitutional: well-nourished, appears stated age, no acute distress  Cardiovascular: regular rate, regular rhythm, well-perfused extremities  Respiratory: unlabored respiratory effort, clear to auscultation bilaterally  Gastrointestinal: soft, non-tender abdomen, no pulsatile mass  Musculoskeletal: supple neck, no lower extremity edema  Integumentary: warm, dry, no rash  Neurologic: awake, alert,  extremities’ motor and sensory functions grossly intact  Psychiatric: A&Ox3, appropriate mood, appropriate affect

## 2019-02-15 ENCOUNTER — INPATIENT (INPATIENT)
Facility: HOSPITAL | Age: 39
LOS: 5 days | Discharge: HOME | End: 2019-02-21
Attending: PSYCHIATRY & NEUROLOGY | Admitting: PSYCHIATRY & NEUROLOGY

## 2019-02-15 VITALS
SYSTOLIC BLOOD PRESSURE: 154 MMHG | OXYGEN SATURATION: 98 % | HEART RATE: 109 BPM | DIASTOLIC BLOOD PRESSURE: 94 MMHG | RESPIRATION RATE: 19 BRPM | TEMPERATURE: 97 F

## 2019-02-15 DIAGNOSIS — Z98.891 HISTORY OF UTERINE SCAR FROM PREVIOUS SURGERY: Chronic | ICD-10-CM

## 2019-02-15 DIAGNOSIS — F31.64 BIPOLAR DISORDER, CURRENT EPISODE MIXED, SEVERE, WITH PSYCHOTIC FEATURES: ICD-10-CM

## 2019-02-15 DIAGNOSIS — M19.90 UNSPECIFIED OSTEOARTHRITIS, UNSPECIFIED SITE: ICD-10-CM

## 2019-02-15 DIAGNOSIS — Z33.2 ENCOUNTER FOR ELECTIVE TERMINATION OF PREGNANCY: Chronic | ICD-10-CM

## 2019-02-15 DIAGNOSIS — Z98.890 OTHER SPECIFIED POSTPROCEDURAL STATES: Chronic | ICD-10-CM

## 2019-02-15 DIAGNOSIS — G47.00 INSOMNIA, UNSPECIFIED: ICD-10-CM

## 2019-02-15 LAB
ALBUMIN SERPL ELPH-MCNC: 4.6 G/DL — SIGNIFICANT CHANGE UP (ref 3.5–5.2)
ALP SERPL-CCNC: 68 U/L — SIGNIFICANT CHANGE UP (ref 30–115)
ALT FLD-CCNC: 16 U/L — SIGNIFICANT CHANGE UP (ref 0–41)
ANION GAP SERPL CALC-SCNC: 13 MMOL/L — SIGNIFICANT CHANGE UP (ref 7–14)
APAP SERPL-MCNC: <5 UG/ML — LOW (ref 10–30)
AST SERPL-CCNC: 19 U/L — SIGNIFICANT CHANGE UP (ref 0–41)
BASOPHILS # BLD AUTO: 0.04 K/UL — SIGNIFICANT CHANGE UP (ref 0–0.2)
BASOPHILS NFR BLD AUTO: 0.4 % — SIGNIFICANT CHANGE UP (ref 0–1)
BILIRUB SERPL-MCNC: 0.3 MG/DL — SIGNIFICANT CHANGE UP (ref 0.2–1.2)
BUN SERPL-MCNC: 13 MG/DL — SIGNIFICANT CHANGE UP (ref 10–20)
CALCIUM SERPL-MCNC: 9.6 MG/DL — SIGNIFICANT CHANGE UP (ref 8.5–10.1)
CHLORIDE SERPL-SCNC: 104 MMOL/L — SIGNIFICANT CHANGE UP (ref 98–110)
CO2 SERPL-SCNC: 24 MMOL/L — SIGNIFICANT CHANGE UP (ref 17–32)
CREAT SERPL-MCNC: 0.9 MG/DL — SIGNIFICANT CHANGE UP (ref 0.7–1.5)
EOSINOPHIL # BLD AUTO: 0.08 K/UL — SIGNIFICANT CHANGE UP (ref 0–0.7)
EOSINOPHIL NFR BLD AUTO: 0.9 % — SIGNIFICANT CHANGE UP (ref 0–8)
ETHANOL SERPL-MCNC: <10 MG/DL — HIGH
GLUCOSE SERPL-MCNC: 99 MG/DL — SIGNIFICANT CHANGE UP (ref 70–99)
HCT VFR BLD CALC: 41.3 % — SIGNIFICANT CHANGE UP (ref 37–47)
HGB BLD-MCNC: 13.8 G/DL — SIGNIFICANT CHANGE UP (ref 12–16)
IMM GRANULOCYTES NFR BLD AUTO: 0.2 % — SIGNIFICANT CHANGE UP (ref 0.1–0.3)
LYMPHOCYTES # BLD AUTO: 2.18 K/UL — SIGNIFICANT CHANGE UP (ref 1.2–3.4)
LYMPHOCYTES # BLD AUTO: 24.2 % — SIGNIFICANT CHANGE UP (ref 20.5–51.1)
MCHC RBC-ENTMCNC: 30.9 PG — SIGNIFICANT CHANGE UP (ref 27–31)
MCHC RBC-ENTMCNC: 33.4 G/DL — SIGNIFICANT CHANGE UP (ref 32–37)
MCV RBC AUTO: 92.6 FL — SIGNIFICANT CHANGE UP (ref 81–99)
MONOCYTES # BLD AUTO: 0.71 K/UL — HIGH (ref 0.1–0.6)
MONOCYTES NFR BLD AUTO: 7.9 % — SIGNIFICANT CHANGE UP (ref 1.7–9.3)
NEUTROPHILS # BLD AUTO: 5.98 K/UL — SIGNIFICANT CHANGE UP (ref 1.4–6.5)
NEUTROPHILS NFR BLD AUTO: 66.4 % — SIGNIFICANT CHANGE UP (ref 42.2–75.2)
NRBC # BLD: 0 /100 WBCS — SIGNIFICANT CHANGE UP (ref 0–0)
PLATELET # BLD AUTO: 304 K/UL — SIGNIFICANT CHANGE UP (ref 130–400)
POTASSIUM SERPL-MCNC: 4.3 MMOL/L — SIGNIFICANT CHANGE UP (ref 3.5–5)
POTASSIUM SERPL-SCNC: 4.3 MMOL/L — SIGNIFICANT CHANGE UP (ref 3.5–5)
PROT SERPL-MCNC: 7.9 G/DL — SIGNIFICANT CHANGE UP (ref 6–8)
RBC # BLD: 4.46 M/UL — SIGNIFICANT CHANGE UP (ref 4.2–5.4)
RBC # FLD: 12.5 % — SIGNIFICANT CHANGE UP (ref 11.5–14.5)
SALICYLATES SERPL-MCNC: <0.3 MG/DL — LOW (ref 4–30)
SODIUM SERPL-SCNC: 141 MMOL/L — SIGNIFICANT CHANGE UP (ref 135–146)
WBC # BLD: 9.01 K/UL — SIGNIFICANT CHANGE UP (ref 4.8–10.8)
WBC # FLD AUTO: 9.01 K/UL — SIGNIFICANT CHANGE UP (ref 4.8–10.8)

## 2019-02-15 RX ORDER — CLONAZEPAM 1 MG
0.5 TABLET ORAL
Qty: 0 | Refills: 0 | Status: DISCONTINUED | OUTPATIENT
Start: 2019-02-15 | End: 2019-02-19

## 2019-02-15 RX ORDER — DIPHENHYDRAMINE HCL 50 MG
50 CAPSULE ORAL ONCE
Qty: 0 | Refills: 0 | Status: DISCONTINUED | OUTPATIENT
Start: 2019-02-15 | End: 2019-02-15

## 2019-02-15 RX ORDER — LITHIUM CARBONATE 300 MG/1
450 TABLET, EXTENDED RELEASE ORAL
Qty: 0 | Refills: 0 | Status: DISCONTINUED | OUTPATIENT
Start: 2019-02-15 | End: 2019-02-16

## 2019-02-15 RX ORDER — QUETIAPINE FUMARATE 200 MG/1
100 TABLET, FILM COATED ORAL AT BEDTIME
Qty: 0 | Refills: 0 | Status: DISCONTINUED | OUTPATIENT
Start: 2019-02-15 | End: 2019-02-21

## 2019-02-15 RX ORDER — INFLUENZA VIRUS VACCINE 15; 15; 15; 15 UG/.5ML; UG/.5ML; UG/.5ML; UG/.5ML
0.5 SUSPENSION INTRAMUSCULAR ONCE
Qty: 0 | Refills: 0 | Status: COMPLETED | OUTPATIENT
Start: 2019-02-15 | End: 2019-02-15

## 2019-02-15 RX ORDER — DIPHENHYDRAMINE HCL 50 MG
50 CAPSULE ORAL ONCE
Qty: 0 | Refills: 0 | Status: COMPLETED | OUTPATIENT
Start: 2019-02-15 | End: 2019-02-15

## 2019-02-15 RX ORDER — ZOLPIDEM TARTRATE 10 MG/1
5 TABLET ORAL ONCE
Qty: 0 | Refills: 0 | Status: DISCONTINUED | OUTPATIENT
Start: 2019-02-15 | End: 2019-02-15

## 2019-02-15 RX ADMIN — Medication 2 MILLIGRAM(S): at 16:14

## 2019-02-15 RX ADMIN — ZOLPIDEM TARTRATE 5 MILLIGRAM(S): 10 TABLET ORAL at 22:45

## 2019-02-15 RX ADMIN — Medication 50 MILLIGRAM(S): at 16:08

## 2019-02-15 RX ADMIN — QUETIAPINE FUMARATE 100 MILLIGRAM(S): 200 TABLET, FILM COATED ORAL at 20:19

## 2019-02-15 RX ADMIN — LITHIUM CARBONATE 450 MILLIGRAM(S): 300 TABLET, EXTENDED RELEASE ORAL at 20:17

## 2019-02-15 RX ADMIN — Medication 50 MILLIGRAM(S): at 16:14

## 2019-02-15 NOTE — ED PROVIDER NOTE - ATTENDING CONTRIBUTION TO CARE
I was present for and supervised the key and critical aspects of the procedures performed during the care of the patient. patient presents for evaluation of increasing agitation and aggression consistent with her history of bipolar d/o.  she states she recently lost her grandmother which is causing her increasing stress. she denies any sucidial or homicidal ideation at this time. she denies any headaches, visual changes, chest pain, shortness of breath abdominal pain or back pain she denies any other extremity pain at this time   On physical exam the patient is agitated requiring both physical and chemical restraints with periods of screaming and spitting here in the ED.  She has no signs of trauma she is nc/at perrla eomi oropharynx  clear cta b/l, rrr s1s2 noted abd-soft nt ndb s+ ext from with no focal deficits noted  A/P- we have consulted dr chaudhary who has evaluated patient in the Ed and reccomends admission for further workup at this time

## 2019-02-15 NOTE — ED ADULT NURSE NOTE - NSIMPLEMENTINTERV_GEN_ALL_ED
Implemented All Universal Safety Interventions:  [place] to call system. Call bell, personal items and telephone within reach. Instruct patient to call for assistance. Room bathroom lighting operational. Non-slip footwear when patient is off stretcher. Physically safe environment: no spills, clutter or unnecessary equipment. Stretcher in lowest position, wheels locked, appropriate side rails in place.

## 2019-02-15 NOTE — ED ADULT TRIAGE NOTE - CHIEF COMPLAINT QUOTE
BIBA via [facility] from home, EMS states patient's family called 911 because patient was displaying erratic behavior, hiding her medications, standing and making unfamiliar noises and not speaking at times and not sleeping all night. on arrival pt states, "I have no idea why I'm here"

## 2019-02-15 NOTE — BEHAVIORAL HEALTH ASSESSMENT NOTE - SUMMARY
pt is presenting with acute decompensation of mood mixed sx of bipolar disorder and psychotic sx. need ipp for safety and stabilization.

## 2019-02-15 NOTE — ED PROVIDER NOTE - PROGRESS NOTE DETAILS
Spoke with patient's mom (Trixie, [phone]) regarding patient's case. States she was last seen normal at 12:30 PM today. Around 1:30 PM patient was noticed by parents to be mumbling, hiding her daily medications, and being aggressive towards them. Mom states she has acted like this in the past during times of stress or with drug use. Call to [facility] pharmacy: Depakote 500mg bid, Adderal ER 20mg, Zolpidem 10mg qd, Clonazepam 0.5mg bid prn, Lithium 300mg bid, Valtrex 500mg po bid, Zolaine patch once weekly, As per pharmacy has not picked up any medications since May 2018 spoke w Dr. Bhakta regarding patient's case. He will evaluate the patient in the ED.

## 2019-02-15 NOTE — ED PROVIDER NOTE - OBJECTIVE STATEMENT
38 year old female w hx of bipolar disorder, previous cocaine and etoh abuse presents to the ED with agitation. Per patient's mother, patient was acting her normal baseline this afternoon, last seen normal around 12:30 PM. Pt was found by parents around 1:30 PM mumbling to herself, refusing to show them her medications, and acting more agitated than usual. Mom states patient has displayed this behavior in the past during times of stress or with alcohol/drug use. Of note, patient lost her grandmother last week.    Pt states she does not know why she is here. Denies any symptoms including pain, nausea, vomiting, diarrhea, fevers, rashes. Denies drug or alcohol use. Denies SI/HI, visual or auditory hallucinations.

## 2019-02-15 NOTE — H&P ADULT - NSHPLABSRESULTS_GEN_ALL_CORE
Vital Signs Last 24 Hrs  T(C): 36.4 (15 Feb 2019 22:09), Max: 36.7 (15 Feb 2019 17:36)  T(F): 97.6 (15 Feb 2019 22:09), Max: 98 (15 Feb 2019 17:36)  HR: 104 (15 Feb 2019 22:09) (87 - 109)  BP: 113/68 (15 Feb 2019 22:09) (113/68 - 154/94)  BP(mean): --  RR: 16 (15 Feb 2019 22:09) (16 - 19)  SpO2: 98% (15 Feb 2019 17:36) (98% - 98%)                        13.8   9.01  )-----------( 304      ( 15 Feb 2019 16:00 )             41.3       02-15    141  |  104  |  13  ----------------------------<  99  4.3   |  24  |  0.9    Ca    9.6      15 Feb 2019 16:00    TPro  7.9  /  Alb  4.6  /  TBili  0.3  /  DBili  x   /  AST  19  /  ALT  16  /  AlkPhos  68  02-15                      Lactate Trend            CAPILLARY BLOOD GLUCOSE
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
## 2019-02-15 NOTE — H&P ADULT - ASSESSMENT
37y/o female  qeusebia ( I have no idea why I'm here")  	altered mental status    	As per family she has been behaving erratically, hallucinating, preoccupied and confuse this morning. she is also depressed, crying and labile past week and is grieving the loss of her grand mother last week, she has not been compliant with medications selectively uses only adderall and has abused benzo from street. in she is observed to be very labile, agitated.

## 2019-02-15 NOTE — BEHAVIORAL HEALTH ASSESSMENT NOTE - OTHER PAST PSYCHIATRIC HISTORY (INCLUDE DETAILS REGARDING ONSET, COURSE OF ILLNESS, INPATIENT/OUTPATIENT TREATMENT)
dx of bipolar disorder and adhd, has been in treatement 20 years. attends [facility] and is partially compliant. no hx of ipp.

## 2019-02-15 NOTE — ED PROVIDER NOTE - NS ED ROS FT
CONSTITUTIONAL: (-) fevers, (-) chills, (-) fatigue,  EYES: (-) vision changes, (-) blurry vision, (-) double vision  ENT: (-) congestion, (-) rhinorrhea, (-) sore throat  NECK: (-) neck pain, (-) neck stiffness  CARDIO: (-) chest pain, (-) palpitations, (-) edema  PULM: (-) cough, (-) sputum, (-) shortness of breath  GI: (-) nausea, (-) vomiting, (-) diarrhea, (-) abdominal pain  : (-) dysuria, (-) hematuria, (-) frequency, (-) flank pain  MSK: (-) back pain, (-) myalgias, (-) gait difficulty  SKIN: (-) rashes, (-) wounds, (-) pallor, (-) ecchymosis  NEURO: (-) headache, (-) dizziness, (-) lightheadedness, (-) syncope, (-) weakness  PSYCH: see HPI

## 2019-02-15 NOTE — ED ADULT NURSE NOTE - CHIEF COMPLAINT QUOTE
BIBA via [facility] from home, EMS states patient's family called 911 because patient was displaying erratic behavior, hiding her medications, standing and making unfamiliar noises and not speaking at times and not sleeping all night. on arrival pt states, "I have no idea why I'm here"

## 2019-02-15 NOTE — ED PROVIDER NOTE - CLINICAL SUMMARY MEDICAL DECISION MAKING FREE TEXT BOX
Patient presents with agitation and aggression likely patient has a history of bipolar d/o,  we have consulted richy who reccomends admission to P

## 2019-02-16 LAB
APPEARANCE UR: CLEAR — SIGNIFICANT CHANGE UP
BILIRUB UR-MCNC: NEGATIVE — SIGNIFICANT CHANGE UP
COLOR SPEC: YELLOW — SIGNIFICANT CHANGE UP
DIFF PNL FLD: NEGATIVE — SIGNIFICANT CHANGE UP
GLUCOSE UR QL: NEGATIVE MG/DL — SIGNIFICANT CHANGE UP
KETONES UR-MCNC: NEGATIVE — SIGNIFICANT CHANGE UP
LEUKOCYTE ESTERASE UR-ACNC: NEGATIVE — SIGNIFICANT CHANGE UP
NITRITE UR-MCNC: NEGATIVE — SIGNIFICANT CHANGE UP
PH UR: 8.5 — SIGNIFICANT CHANGE UP (ref 5–8)
PROT UR-MCNC: NEGATIVE MG/DL — SIGNIFICANT CHANGE UP
SP GR SPEC: 1.01 — SIGNIFICANT CHANGE UP (ref 1.01–1.03)
UROBILINOGEN FLD QL: 1 MG/DL (ref 0.2–0.2)

## 2019-02-16 RX ORDER — DEXTROAMPHETAMINE SACCHARATE, AMPHETAMINE ASPARTATE, DEXTROAMPHETAMINE SULFATE AND AMPHETAMINE SULFATE 1.875; 1.875; 1.875; 1.875 MG/1; MG/1; MG/1; MG/1
20 TABLET ORAL
Qty: 0 | Refills: 0 | Status: DISCONTINUED | OUTPATIENT
Start: 2019-02-16 | End: 2019-02-19

## 2019-02-16 RX ORDER — DIVALPROEX SODIUM 500 MG/1
250 TABLET, DELAYED RELEASE ORAL THREE TIMES A DAY
Qty: 0 | Refills: 0 | Status: DISCONTINUED | OUTPATIENT
Start: 2019-02-16 | End: 2019-02-19

## 2019-02-16 RX ORDER — HYDROXYZINE HCL 10 MG
100 TABLET ORAL AT BEDTIME
Qty: 0 | Refills: 0 | Status: DISCONTINUED | OUTPATIENT
Start: 2019-02-16 | End: 2019-02-19

## 2019-02-16 RX ADMIN — QUETIAPINE FUMARATE 100 MILLIGRAM(S): 200 TABLET, FILM COATED ORAL at 20:49

## 2019-02-16 RX ADMIN — Medication 100 MILLIGRAM(S): at 20:48

## 2019-02-16 RX ADMIN — Medication 0.5 MILLIGRAM(S): at 11:36

## 2019-02-16 RX ADMIN — DIVALPROEX SODIUM 250 MILLIGRAM(S): 500 TABLET, DELAYED RELEASE ORAL at 20:48

## 2019-02-16 NOTE — PROGRESS NOTE BEHAVIORAL HEALTH - NSBHFUPINTERVALHXFT_PSY_A_CORE
Pt was seen, and evaluated, and chart was reviewed. No acute events overnight.  Patient is alert, anxious, agitated, irritable, non-compliant with treatment. Patient is in good behavioral control.  Pt requires much reassurance and redirection.     ***Pt denies and presents no evidence for suicidal or homicidal ideas plans or intentions.      Pt is has o insight into her condition, is irritable, c rapid pressured speech and flight of ideas. Pt was seen, and evaluated, and chart was reviewed. No acute events overnight.  Patient is alert, anxious, agitated, irritable, non-compliant with treatment. Patient is in good behavioral control.  Pt requires much reassurance and redirection.     ***Pt denies and presents no evidence for suicidal or homicidal ideas plans or intentions.      Pt is has o insight into her condition, is irritable, c rapid pressured speech and flight of ideas. ISTOP shws pt on adderal for a long time 20 mg po every other day. Medication was continued. Pt was treated c Depakote in the past. Depakote was initiated.

## 2019-02-16 NOTE — CONSULT NOTE ADULT - SUBJECTIVE AND OBJECTIVE BOX
ALEX LILLY  38y  Female      Patient is a 38y old  Female who presents with a chief complaint of change in ms (15 Feb 2019 22:10)    HPI:  37y/o female  qoute ( I have no idea why I'm here")  	altered mental status    	As per family she has been behaving erratically, hallucinating, preoccupied and confuse this morning. she is also depressed, crying and labile past week and is grieving the loss of her grand mother last week, she has not been compliant with medications selectively uses only adderall and has abused benzo from street. in she is observed to be very labile, agitated. (15 Feb 2019 22:10)    INTERVAL HPI/OVERNIGHT EVENTS:  HEALTH ISSUES - PROBLEM Dx:  Insomnia: Insomnia  Arthritis: Arthritis  Bipolar disorder, curr episode mixed, severe, with psychotic features  Bipolar disorder, current episode mixed, severe, with psychotic features: Bipolar disorder, current episode mixed, severe, with psychotic features      still very anxious with flight of ideas  no medical isseus      PAST MEDICAL & SURGICAL HISTORY:  Insomnia  Bipolar disorder: pt stopped lithium 1-1.5 mos ago  Arthritis: OA  History of surgery: carpal tunnel release x2  last 5 y ago  Fetal death from pregnancy termination: x4-5  History of  delivery    FAMILY HISTORY:  No pertinent family history in first degree relatives    clonazePAM Tablet 0.5 milliGRAM(s) Oral two times a day PRN  influenza   Vaccine 0.5 milliLiter(s) IntraMuscular once  lithium CR (ESKALITH-CR) 450 milliGRAM(s) Oral two times a day  QUEtiapine 100 milliGRAM(s) Oral at bedtime      REVIEW OF SYSTEMS:  CONSTITUTIONAL: No fever, weight loss, or fatigue  EYES: No eye pain, visual disturbances, or discharge  ENMT:  No difficulty hearing, tinnitus, vertigo; No sinus or throat pain  NECK: No pain or stiffness  BREASTS: No pain, masses, or nipple discharge  RESPIRATORY: No cough, wheezing, chills or hemoptysis; No shortness of breath  CARDIOVASCULAR: No chest pain, palpitations, dizziness, or leg swelling  GASTROINTESTINAL: No abdominal or epigastric pain. No nausea, vomiting, or hematemesis; No diarrhea or constipation. No melena or hematochezia.  GENITOURINARY: No dysuria, frequency, hematuria, or incontinence  NEUROLOGICAL: No headaches, memory loss, loss of strength, numbness, or tremors  SKIN: No itching, burning, rashes, or lesions   LYMPH NODES: No enlarged glands  ENDOCRINE: No heat or cold intolerance; No hair loss  MUSCULOSKELETAL: No joint pain or swelling; No muscle, back, or extremity pain  PSYCHIATRIC: as per hpi and previous psych history  HEME/LYMPH: No easy bruising, or bleeding gums  ALLERY AND IMMUNOLOGIC: No hives or eczema    T(C): 36.8 (19 @ 05:40), Max: 36.8 (19 @ 05:40)  HR: 65 (19 @ 05:40) (65 - 109)  BP: 96/70 (19 @ 05:40) ( - 154/94)  RR: 16 (19 @ 05:40) ( - )  SpO2: 98% (02-15-19 @ 17:36) (98% - 98%)  Wt(kg): --Vital Signs Last 24 Hrs  T(C): 36.8 (2019 05:40), Max: 36.8 (2019 05:40)  T(F): 98.2 (2019 05:40), Max: 98.2 (2019 05:40)  HR: 65 (2019 05:40) (65 - 109)  BP: 96/70 (2019 05:40) (96/70 - 154/94)  BP(mean): --  RR: 16 (2019 05:40) ( - )  SpO2: 98% (15 Feb 2019 17:36) (98% - 98%)    PHYSICAL EXAM:  GENERAL: NAD,well-developed  HEAD:  Atraumatic, Normocephalic  EYES: EOMI, PERRLA, conjunctiva and sclera clear  ENMT: No tonsillar erythema, exudates, or enlargement; Moist mucous membranes, Good dentition, No lesions  NECK: Supple, No JVD, Normal thyroid  CHEST/LUNG: Clear bs bilaterally; No rales, rhonchi, wheezing  HEART: Regular rate and rhythm; No murmurs, rubs, or gallops  ABDOMEN: Soft, Nontender, Nondistended; Bowel sounds present  EXTREMITIES:  2+ Peripheral Pulses, No clubbing, cyanosis, or edema  LYMPH: No lymphadenopathy noted  SKIN: No rashes or lesions  mutiple tatooos  Neuro: alert  no focal deficits    Consultant(s) Notes Reviewed:  [x ] YES  [ ] NO  Care Discussed with Consultants/Other Providers [ x] YES  [ ] NO    LABS:                        13.8   9.01  )-----------( 304      ( 15 Feb 2019 16:00 )             41.3     02-15    141  |  104  |  13  ----------------------------<  99  4.3   |  24  |  0.9    Ca    9.6      15 Feb 2019 16:00    TPro  7.9  /  Alb  4.6  /  TBili  0.3  /  DBili  x   /  AST  19  /  ALT  16  /  AlkPhos  68  02-15        CAPILLARY BLOOD GLUCOSE                RADIOLOGY & ADDITIONAL TESTS:    Imaging Personally Reviewed:  [ ] YES  [ ] NO

## 2019-02-17 RX ORDER — NORELGESTROMIN AND ETHINYL ESTRADIOL 150; 35 UG/D; UG/D
1 PATCH TRANSDERMAL
Qty: 0 | Refills: 0 | Status: DISCONTINUED | OUTPATIENT
Start: 2019-02-17 | End: 2019-02-21

## 2019-02-17 RX ADMIN — Medication 0.5 MILLIGRAM(S): at 05:52

## 2019-02-17 RX ADMIN — DIVALPROEX SODIUM 250 MILLIGRAM(S): 500 TABLET, DELAYED RELEASE ORAL at 20:17

## 2019-02-17 RX ADMIN — Medication 0.5 MILLIGRAM(S): at 10:25

## 2019-02-17 RX ADMIN — Medication 100 MILLIGRAM(S): at 20:17

## 2019-02-17 RX ADMIN — QUETIAPINE FUMARATE 100 MILLIGRAM(S): 200 TABLET, FILM COATED ORAL at 20:17

## 2019-02-17 NOTE — PROGRESS NOTE BEHAVIORAL HEALTH - NSBHFUPINTERVALHXFT_PSY_A_CORE
Pt was seen, and evaluated, and chart was reviewed. No acute events overnight.  Patient is alert, anxious, agitated, irritable, non-compliant with treatment. Patient is in good behavioral control.  Pt requires much reassurance and redirection.     ***Pt denies and presents no evidence for suicidal or homicidal ideas plans or intentions. pt required   prn medications for anxiety    Pt is has o insight into her condition, is irritable, c rapid pressured speech and flight of ideas. ISTOP shws pt on adderal for a long time 20 mg po every other day. Medication was continued. Pt was treated c Depakote in the past. Depakote was initiated.

## 2019-02-17 NOTE — PROGRESS NOTE BEHAVIORAL HEALTH - PRN MEDS
clonazePAM Tablet   0.5 milliGRAM(s) Oral (02-16-19 @ 11:36)
clonazePAM Tablet   0.5 milliGRAM(s) Oral (02-16-19 @ 11:36)

## 2019-02-18 LAB
AMPHET UR-MCNC: -268 — SIGNIFICANT CHANGE UP
BARBITURATES UR SCN-MCNC: -601 — SIGNIFICANT CHANGE UP
BENZODIAZ UR-MCNC: 22 — SIGNIFICANT CHANGE UP
COCAINE METAB.OTHER UR-MCNC: 500 — SIGNIFICANT CHANGE UP
METHADONE UR-MCNC: -426 — SIGNIFICANT CHANGE UP
OPIATES UR-MCNC: SIGNIFICANT CHANGE UP
PCP SPEC-MCNC: SIGNIFICANT CHANGE UP
PROPOXYPHENE QUALITATIVE URINE RESULT: SIGNIFICANT CHANGE UP

## 2019-02-18 RX ORDER — CLONAZEPAM 1 MG
0.5 TABLET ORAL ONCE
Qty: 0 | Refills: 0 | Status: DISCONTINUED | OUTPATIENT
Start: 2019-02-18 | End: 2019-02-18

## 2019-02-18 RX ORDER — ACETAMINOPHEN 500 MG
650 TABLET ORAL EVERY 6 HOURS
Qty: 0 | Refills: 0 | Status: DISCONTINUED | OUTPATIENT
Start: 2019-02-18 | End: 2019-02-21

## 2019-02-18 RX ORDER — NICOTINE POLACRILEX 2 MG
2 GUM BUCCAL EVERY 6 HOURS
Qty: 0 | Refills: 0 | Status: DISCONTINUED | OUTPATIENT
Start: 2019-02-18 | End: 2019-02-21

## 2019-02-18 RX ADMIN — Medication 0.5 MILLIGRAM(S): at 08:58

## 2019-02-18 RX ADMIN — Medication 0.5 MILLIGRAM(S): at 21:51

## 2019-02-18 RX ADMIN — Medication 100 MILLIGRAM(S): at 20:35

## 2019-02-18 RX ADMIN — Medication 0.5 MILLIGRAM(S): at 01:56

## 2019-02-18 RX ADMIN — Medication 2 MILLIGRAM(S): at 10:22

## 2019-02-18 RX ADMIN — DIVALPROEX SODIUM 250 MILLIGRAM(S): 500 TABLET, DELAYED RELEASE ORAL at 20:35

## 2019-02-18 RX ADMIN — QUETIAPINE FUMARATE 100 MILLIGRAM(S): 200 TABLET, FILM COATED ORAL at 20:36

## 2019-02-18 RX ADMIN — DIVALPROEX SODIUM 250 MILLIGRAM(S): 500 TABLET, DELAYED RELEASE ORAL at 08:58

## 2019-02-19 RX ORDER — HYDROCORTISONE 1 %
1 OINTMENT (GRAM) TOPICAL EVERY 12 HOURS
Qty: 0 | Refills: 0 | Status: DISCONTINUED | OUTPATIENT
Start: 2019-02-19 | End: 2019-02-21

## 2019-02-19 RX ORDER — CLONAZEPAM 1 MG
0.5 TABLET ORAL EVERY 12 HOURS
Qty: 0 | Refills: 0 | Status: DISCONTINUED | OUTPATIENT
Start: 2019-02-19 | End: 2019-02-21

## 2019-02-19 RX ORDER — HYDROXYZINE HCL 10 MG
50 TABLET ORAL EVERY 6 HOURS
Qty: 0 | Refills: 0 | Status: DISCONTINUED | OUTPATIENT
Start: 2019-02-19 | End: 2019-02-19

## 2019-02-19 RX ORDER — DIVALPROEX SODIUM 500 MG/1
500 TABLET, DELAYED RELEASE ORAL
Qty: 0 | Refills: 0 | Status: DISCONTINUED | OUTPATIENT
Start: 2019-02-19 | End: 2019-02-21

## 2019-02-19 RX ADMIN — DIVALPROEX SODIUM 250 MILLIGRAM(S): 500 TABLET, DELAYED RELEASE ORAL at 08:12

## 2019-02-19 RX ADMIN — DIVALPROEX SODIUM 250 MILLIGRAM(S): 500 TABLET, DELAYED RELEASE ORAL at 12:06

## 2019-02-19 RX ADMIN — Medication 0.5 MILLIGRAM(S): at 21:02

## 2019-02-19 RX ADMIN — DIVALPROEX SODIUM 500 MILLIGRAM(S): 500 TABLET, DELAYED RELEASE ORAL at 20:44

## 2019-02-19 RX ADMIN — Medication 2 MILLIGRAM(S): at 19:39

## 2019-02-19 RX ADMIN — Medication 1 APPLICATION(S): at 21:46

## 2019-02-19 RX ADMIN — Medication 0.5 MILLIGRAM(S): at 11:44

## 2019-02-19 RX ADMIN — Medication 100 MILLIGRAM(S): at 20:44

## 2019-02-19 RX ADMIN — Medication 0.5 MILLIGRAM(S): at 05:07

## 2019-02-19 RX ADMIN — NORELGESTROMIN AND ETHINYL ESTRADIOL 1 PATCH: 150; 35 PATCH TRANSDERMAL at 23:00

## 2019-02-19 RX ADMIN — Medication 650 MILLIGRAM(S): at 15:24

## 2019-02-19 RX ADMIN — QUETIAPINE FUMARATE 100 MILLIGRAM(S): 200 TABLET, FILM COATED ORAL at 20:52
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
## 2019-02-19 NOTE — PROGRESS NOTE ADULT - SUBJECTIVE AND OBJECTIVE BOX
pt stable alert in NAD  no new complaints    BIPOLAR DISORDER,CURR EPISODE MIXED  ^AMS  No pertinent family history in first degree relatives  MEWS Score  Insomnia  Bipolar disorder  Arthritis  No pertinent past medical history  Bipolar disorder, curr episode mixed, severe, with psychotic features  Insomnia  Arthritis  Bipolar disorder, curr episode mixed, severe, with psychotic features  Bipolar disorder, current episode mixed, severe, with psychotic features  History of surgery  Fetal death from pregnancy termination  History of  delivery  AMS  43    HEALTH ISSUES - PROBLEM Dx:  Insomnia: Insomnia  Arthritis: Arthritis  Bipolar disorder, curr episode mixed, severe, with psychotic features  Bipolar disorder, current episode mixed, severe, with psychotic features: Bipolar disorder, current episode mixed, severe, with psychotic features        PAST MEDICAL & SURGICAL HISTORY:  Insomnia  Bipolar disorder: pt stopped lithium 1-1.5 mos ago  Arthritis: OA  History of surgery: carpal tunnel release x2  last 5 y ago  Fetal death from pregnancy termination: x4-5  History of  delivery    ibuprofen (Rash)  TraZODone Hydrochloride (Rash)      FAMILY HISTORY:  No pertinent family history in first degree relatives      acetaminophen   Tablet .. 650 milliGRAM(s) Oral every 6 hours PRN  amphetamine/dextroamphetamine XR 20 milliGRAM(s) Oral <User Schedule>  clonazePAM Tablet 0.5 milliGRAM(s) Oral two times a day PRN  diVALproex  milliGRAM(s) Oral three times a day  ethinyl  estradiol-norelgestromin (XULANE) Patch 1 Patch Transdermal every 7 days  hydrOXYzine hydrochloride 100 milliGRAM(s) Oral at bedtime  influenza   Vaccine 0.5 milliLiter(s) IntraMuscular once  nicotine  Polacrilex Gum 2 milliGRAM(s) Oral every 6 hours PRN  QUEtiapine 100 milliGRAM(s) Oral at bedtime      T(C): 36.4 (19 @ 05:30), Max: 36.6 (19 @ 10:30)  HR: 87 (19 @ 05:30) (69 - 87)  BP: 111/69 (19 @ 05:30) (111/69 - 131/81)  RR: 18 (19 @ 05:30) (16 - 18)  SpO2: --    PE;  general:  no acute cahnges in nad    Lungs:    Heart:    EXT:    Neuro:  nod eficits alert                          CAPILLARY BLOOD GLUCOSE

## 2019-02-19 NOTE — PROGRESS NOTE ADULT - SUBJECTIVE AND OBJECTIVE BOX
pt states she is experiencing pruritic rash after dose of Vistaril 50mg PO on upper extremity.  States this is the first time she had vistaril (as per chart review she as has benadryl in the past without any reaction)  Will order hydrocortisone cream for rash, d/c vistaril and monitor.

## 2019-02-19 NOTE — CHART NOTE - NSCHARTNOTEFT_GEN_A_CORE
Social Work Admission Note:    Patient June Shoemaker is a 38 year old Caucasion female who presents with erratic behavior within the context of medication noncompliance and was brought to [facility] ED by EMS. Her family contacted medical services after observation of patient presenting with symptoms of auditory hallucinations, depression, being more tearful and labile within the past week within the context of her grandmother recently dying. She has been observed to be selectively compliant with medication, taking Adderall and abusing other benzodiazepines. Patient was reported to have been making unfamiliar noises, being nonverbal at times, and not sleeping at night. Upon arrival to ED, patient was unable to verbalize why she was here. She was agitated and required medication in ED in addition to restraints. Upon evaluation and assessment, patient was admitted to for continued observation, medication management, and stabilization of symptoms.    Patient has a psychiatric history of bipolar disorder and ADHD, and reports no previous psychiatric hospitalization. She is seen in the community by behavioral health providers through the [facility] and her medical provider, who prescribes her psychiatric medciation. Patient denies any past or current HI, SI, or visual hallucinations. She does not report any current substance use, but did report to this Writer that she has recently had alcohol. Patient does not report any other psychiatric or physical health complaints.    Patient is domiciled in [place] with her parents and 18 year old daughter.  She is currently unemployed and disabled. Patient reports that she maintains strong communication with her family, but does endorse tension between her and her mother about drinking. Patient was unable to state any other psychosocial stressors aside from recent passing of her grandmother.     Patient will continue to remain on unit for psychiatric stabilization. Patient will meet daily with clinical team of psychiatrist, nursing staff and . Patient will be referred to appropriate level of care once psychiatrically stable for discharge. Please refer to Social Work Psychosocial for additional information.

## 2019-02-20 LAB
CHOLEST SERPL-MCNC: 188 MG/DL — SIGNIFICANT CHANGE UP (ref 100–200)
ESTIMATED AVERAGE GLUCOSE: 97 MG/DL — SIGNIFICANT CHANGE UP (ref 68–114)
HBA1C BLD-MCNC: 5 % — SIGNIFICANT CHANGE UP (ref 4–5.6)
HDLC SERPL-MCNC: 87 MG/DL — SIGNIFICANT CHANGE UP
LIPID PNL WITH DIRECT LDL SERPL: 90 MG/DL — SIGNIFICANT CHANGE UP (ref 4–129)
TOTAL CHOLESTEROL/HDL RATIO MEASUREMENT: 2.2 RATIO — LOW (ref 4–5.5)
TRIGL SERPL-MCNC: 131 MG/DL — SIGNIFICANT CHANGE UP (ref 10–149)

## 2019-02-20 RX ORDER — ZOLPIDEM TARTRATE 10 MG/1
1 TABLET ORAL
Qty: 0 | Refills: 0 | COMMUNITY

## 2019-02-20 RX ORDER — BUPRENORPHINE AND NALOXONE 2; .5 MG/1; MG/1
0 TABLET SUBLINGUAL
Qty: 0 | Refills: 0 | COMMUNITY

## 2019-02-20 RX ORDER — DIPHENHYDRAMINE HCL 50 MG
50 CAPSULE ORAL EVERY 6 HOURS
Qty: 0 | Refills: 0 | Status: DISCONTINUED | OUTPATIENT
Start: 2019-02-20 | End: 2019-02-21

## 2019-02-20 RX ORDER — QUETIAPINE FUMARATE 200 MG/1
1 TABLET, FILM COATED ORAL
Qty: 0 | Refills: 0 | COMMUNITY
Start: 2019-02-20

## 2019-02-20 RX ORDER — LANOLIN ALCOHOL/MO/W.PET/CERES
1 CREAM (GRAM) TOPICAL
Qty: 0 | Refills: 0 | COMMUNITY

## 2019-02-20 RX ORDER — DIVALPROEX SODIUM 500 MG/1
1 TABLET, DELAYED RELEASE ORAL
Qty: 0 | Refills: 0 | DISCHARGE
Start: 2019-02-20

## 2019-02-20 RX ADMIN — Medication 2 MILLIGRAM(S): at 13:11

## 2019-02-20 RX ADMIN — Medication 2 MILLIGRAM(S): at 19:51

## 2019-02-20 RX ADMIN — DIVALPROEX SODIUM 500 MILLIGRAM(S): 500 TABLET, DELAYED RELEASE ORAL at 08:18

## 2019-02-20 RX ADMIN — DIVALPROEX SODIUM 500 MILLIGRAM(S): 500 TABLET, DELAYED RELEASE ORAL at 20:31

## 2019-02-20 RX ADMIN — QUETIAPINE FUMARATE 100 MILLIGRAM(S): 200 TABLET, FILM COATED ORAL at 20:37

## 2019-02-20 RX ADMIN — Medication 0.5 MILLIGRAM(S): at 11:19

## 2019-02-20 NOTE — DISCHARGE NOTE BEHAVIORAL HEALTH - NSBHDCSWCOMMENTSFT_PSY_A_CORE
Discharge Summary Faxed to [phone] on 02/21/2019 at 1pm. Discharge Summary Faxed to [phone] on 02/21/2019 at 1pm. Discharge Summary Faxed to [phone] on 02/21/2019 at 1pm

## 2019-02-20 NOTE — DISCHARGE NOTE BEHAVIORAL HEALTH - MEDICATION SUMMARY - MEDICATIONS TO TAKE
I will START or STAY ON the medications listed below when I get home from the hospital:    divalproex sodium 500 mg oral delayed release tablet  -- 1 tab(s) by mouth 2 times a day x 14 days   -- Indication: For Mood     QUEtiapine 100 mg oral tablet  -- 1 tab(s) by mouth once a day (at bedtime) x 14 days   -- Indication: For Mood    clotrimazole 2% vaginal cream with applicator  -- 1 applicatorful vaginally once a day x 6 days ; it was started in the hospital.   -- Indication: For yeast infection

## 2019-02-20 NOTE — PROGRESS NOTE BEHAVIORAL HEALTH - RISK ASSESSMENT
#Risk factors - history of polysubstance use, history of noncompliance, actively grieving her grandmothers death, access to adderall and ambien, labile mood, anxiety   #Protective factors - no concern from mother, no stated suicidality, outpatient follow up, future oriented toward being with family, engaged in treatment     Based on the above the patient has a latent risk which is not currently mitigated by protective factors. Continued admission on a 9.39 status is warranted.
#Risk factors - history of polysubstance use, history of noncompliance, actively grieving her grandmothers death, access to adderall and ambien, labile mood, anxiety   #Protective factors - no concern from mother, no stated suicidality, outpatient follow up, future oriented toward being with family, engaged in treatment     Based on the above the patient has a latent risk which is not currently mitigated by protective factors. Continued admission on a 9.39 status is warranted.

## 2019-02-20 NOTE — PROGRESS NOTE BEHAVIORAL HEALTH - NSBHCHARTREVIEWLAB_PSY_A_CORE FT
15 Feb 2019 16:00  Sodium 141 [135 - 146]  Chloride 104 [98 - 110]  BUN 13 [10 - 20]  Glucose 99 [70 - 99]  Potassium 4.3 [3.5 - 5.0]  Anion Gap 13 [7 - 14]  Carbon dioxide 24 [17 - 32]  Creatinine 0.9 [0.7 - 1.5]      Ca    9.6 [8.5 - 10.1]      15 Feb 2019 16:00        15 Feb 2019 16:00  TPro 7.9 [6.0 - 8.0]  Alb  4.6 [3.5 - 5.2]   TBili 0.3 [0.2 - 1.2]   DBili x       AST  19 [0 - 41]  ALT  16 [0 - 41]   AlkPhos 68 [30 - 115]         CBC Full  -  ( 15 Feb 2019 16:00 )  WBC Count : 9.01 K/uL  Hemoglobin : 13.8 g/dL  Hematocrit : 41.3 %  Platelet Count - Automated : 304 K/uL  Mean Cell Volume : 92.6 fL  Mean Cell Hemoglobin : 30.9 pg  Mean Cell Hemoglobin Concentration : 33.4 g/dL  Auto Neutrophil # : 5.98 K/uL  Auto Lymphocyte # : 2.18 K/uL  Auto Monocyte # : 0.71 K/uL  Auto Eosinophil # : 0.08 K/uL  Auto Basophil # : 0.04 K/uL  Auto Neutrophil % : 66.4 %  Auto Lymphocyte % : 24.2 %  Auto Monocyte % : 7.9 %  Auto Eosinophil % : 0.9 %  Auto Basophil % : 0.4 %
Lipid Profile in AM (02.20.19 @ 09:01)    Total Cholesterol/HDL Ratio Measurement: 2.2 Ratio    Cholesterol, Serum: 188 mg/dL    Triglycerides, Serum: 131 mg/dL    HDL Cholesterol, Serum: 87: HDL Levels >/= 60 mg/dL are considered beneficial and a "negative" risk  factor.  Effective 08/15/2018: New reference range and interpretive comment. mg/dL    Direct LDL: 90: LDL Cholesterol (mg/dL) --- Interpretive Comment (for adults 18 and over)  Optimal LDL Level may vary based on clinical situation  Below 70                   Ideal for people at very high risk of heart  disease  Below 100                  Ideal for people at risk of heart disease  100 - 129                    [place]  130 - 159                    Borderline high  160 - 189                    High  190 and Above           Very high mg/dL
15 Feb 2019 16:00  Sodium 141 [135 - 146]  Chloride 104 [98 - 110]  BUN 13 [10 - 20]  Glucose 99 [70 - 99]  Potassium 4.3 [3.5 - 5.0]  Anion Gap 13 [7 - 14]  Carbon dioxide 24 [17 - 32]  Creatinine 0.9 [0.7 - 1.5]      Ca    9.6 [8.5 - 10.1]      15 Feb 2019 16:00        15 Feb 2019 16:00  TPro 7.9 [6.0 - 8.0]  Alb  4.6 [3.5 - 5.2]   TBili 0.3 [0.2 - 1.2]   DBili x       AST  19 [0 - 41]  ALT  16 [0 - 41]   AlkPhos 68 [30 - 115]         CBC Full  -  ( 15 Feb 2019 16:00 )  WBC Count : 9.01 K/uL  Hemoglobin : 13.8 g/dL  Hematocrit : 41.3 %  Platelet Count - Automated : 304 K/uL  Mean Cell Volume : 92.6 fL  Mean Cell Hemoglobin : 30.9 pg  Mean Cell Hemoglobin Concentration : 33.4 g/dL  Auto Neutrophil # : 5.98 K/uL  Auto Lymphocyte # : 2.18 K/uL  Auto Monocyte # : 0.71 K/uL  Auto Eosinophil # : 0.08 K/uL  Auto Basophil # : 0.04 K/uL  Auto Neutrophil % : 66.4 %  Auto Lymphocyte % : 24.2 %  Auto Monocyte % : 7.9 %  Auto Eosinophil % : 0.9 %  Auto Basophil % : 0.4 %

## 2019-02-20 NOTE — DISCHARGE NOTE BEHAVIORAL HEALTH - NSBHDCRESPONSEFT_PSY_A_CORE
Pt has made significant progress over the course of hospitalization. With continuous psychotherapy from the treatment team and the medications, patient reports feeling better, sleeping and eating well. Thought process and insight improved. Pt was calm and cooperative and was in good behavioral control. Patient denied any suicidal or homicidal ideations. Patient denied any auditory or visual hallucinations. Pt was evaluated by treatment team, pt is stable for discharge and patient shows no imminent danger to self, others or property at this time. Pt's mother was involved in her care, felt that pt was safe to come home. Did not have any safety concerns.

## 2019-02-20 NOTE — PROGRESS NOTE BEHAVIORAL HEALTH - PRIMARY DX
Bipolar disorder, current episode mixed, severe, with psychotic features
Bipolar disorder, curr episode mixed, severe, with psychotic features
Bipolar disorder, curr episode mixed, severe, with psychotic features
Bipolar disorder, current episode mixed, severe, with psychotic features

## 2019-02-20 NOTE — DISCHARGE NOTE BEHAVIORAL HEALTH - CONDITIONS AT DISCHARGE
Patient says she feels good and ready for discharge. Understands follow-up care. Denies suicidal or homicidal ideations or plan now; denies hallucinations or delusions now. No complaints or concerns offered. To be discharged with belongings and instructions.

## 2019-02-20 NOTE — DISCHARGE NOTE BEHAVIORAL HEALTH - NSBHDCMEDSFT_PSY_A_CORE
Patient was started on Depakote 250 mg TID which was titrated to 500 mg BID. Pt was also given Seroquel 100 mg qhs for augmentation and insomnia. Patient was compliant with the medications and denied any side-effects of the medications.   Adderall and Ambien were held during this admission.

## 2019-02-20 NOTE — DISCHARGE NOTE BEHAVIORAL HEALTH - MEDICATION SUMMARY - MEDICATIONS TO CHANGE
Implemented All Universal Safety Interventions:  [place] to call system. Call bell, personal items and telephone within reach. Instruct patient to call for assistance. Room bathroom lighting operational. Non-slip footwear when patient is off stretcher. Physically safe environment: no spills, clutter or unnecessary equipment. Stretcher in lowest position, wheels locked, appropriate side rails in place. I will SWITCH the dose or number of times a day I take the medications listed below when I get home from the hospital:  None

## 2019-02-20 NOTE — PROGRESS NOTE BEHAVIORAL HEALTH - PROBLEM SELECTOR PROBLEM 1
Bipolar disorder, curr episode mixed, severe, with psychotic features
Bipolar disorder, current episode mixed, severe, with psychotic features
Bipolar disorder, current episode mixed, severe, with psychotic features
Bipolar disorder, curr episode mixed, severe, with psychotic features

## 2019-02-20 NOTE — DISCHARGE NOTE BEHAVIORAL HEALTH - MEDICATION SUMMARY - MEDICATIONS TO STOP TAKING
I will STOP taking the medications listed below when I get home from the hospital:    Melatonin 10 mg oral tablet, disintegrating  -- 1 tab(s) by mouth once a day (at bedtime)    biotin  -- orally once a day  1 tab    Ambien 10 mg oral tablet  -- 1 tab(s) by mouth once a day (at bedtime)    Suboxone 8 mg-2 mg sublingual tablet  -- pt denies oxycodone or any drug addiction    per pt last  dose 1/2 tab 1 week ago

## 2019-02-20 NOTE — PROGRESS NOTE BEHAVIORAL HEALTH - NSBHFUPINTERVALHXFT_PSY_A_CORE
Pt was seen, evaluated, chart reviewed.  As per nursing staff, no events overnight. On evaluation, pt reports that she had an allergic reaction to something last night, requiring cortisol cream. Reports rash has improved. Pt has been calm and cooperative on the unit. No behavioral outbursts. Is compliant with medication, denies negative side effects. Endorsed good sleep and appetite. Denied A/V hallucinations. Denied paranoia. Denied suicidal/homicidal ideation, intent or plan. Pt reports that she wants to go home and wants to continue with her outpatient follow up.     As per mother, Rosina [phone], she reports that she visited the patient last night. States pt is looking "rested" to her. States pt is looking "calm and beautiful." She reports that she would like for pt to come home, has no safety concerns. Reports that pt never had any suicidal/homicidal ideation.

## 2019-02-20 NOTE — PROGRESS NOTE BEHAVIORAL HEALTH - NSBHCHARTREVIEWVS_PSY_A_CORE FT
ICU Vital Signs Last 24 Hrs  T(C): 36.2 (20 Feb 2019 09:58), Max: 36.2 (20 Feb 2019 09:58)  T(F): 97.2 (20 Feb 2019 09:58), Max: 97.2 (20 Feb 2019 09:58)  HR: 80 (20 Feb 2019 09:58) (71 - 80)  BP: 109/58 (20 Feb 2019 09:58) (103/56 - 116/80)  BP(mean): --  ABP: --  ABP(mean): --  RR: 18 (20 Feb 2019 09:58) (18 - 20)  SpO2: --
T(C): 36.3 (02-16-19 @ 17:24), Max: 36.8 (02-16-19 @ 05:40)  HR: 93 (02-16-19 @ 17:24) (65 - 104)  BP: 115/65 (02-16-19 @ 17:24) (96/70 - 136/94)  RR: 16 (02-16-19 @ 17:24) (16 - 18)  SpO2: --
Vital Signs Last 24 Hrs  T(C): 36.6 (17 Feb 2019 19:48), Max: 36.6 (17 Feb 2019 19:48)  T(F): 97.9 (17 Feb 2019 19:48), Max: 97.9 (17 Feb 2019 19:48)  HR: 100 (17 Feb 2019 19:48) (78 - 100)  BP: 119/76 (17 Feb 2019 19:48) (115/86 - 141/82)  BP(mean): --  RR: 18 (17 Feb 2019 19:48) (18 - 20)  SpO2: --
Vital Signs Last 24 Hrs  T(C): 36.2 (19 Feb 2019 10:09), Max: 36.4 (18 Feb 2019 18:03)  T(F): 97.2 (19 Feb 2019 10:09), Max: 97.6 (18 Feb 2019 18:03)  HR: 83 (19 Feb 2019 10:09) (70 - 87)  BP: 118/87 (19 Feb 2019 10:09) (111/69 - 120/81)  BP(mean): --  RR: 18 (19 Feb 2019 10:09) (18 - 18)  SpO2: --

## 2019-02-20 NOTE — PROGRESS NOTE BEHAVIORAL HEALTH - SUMMARY
38 year old female, domiciled with mother and 18 year old daughter, unemployed, high school educated, with no significant PMH and a PPH of bipolar disorder and polysubstance abuse and suspected borderline personality disorder (per former psychiatrist Dr. Ruiz) who was admitted after her mother called her for being "incoherent" after drinking beer and taking valium in the context of grieving her grandmothers passing. No known suicidality at that time.     #Mood instability  Depakote 500 BID   Seroquel 100 QHS      #Anxiety   Vistaril PRN     #Birth Control   Xulane patch     #Nicotine cravings   Nicotine gum PRN
pt is presenting with acute decompensation of mood mixed sx of bipolar disorder and psychotic sx. need ipp for safety and stabilization.
pt is presenting with acute decompensation of mood mixed sx of bipolar disorder and psychotic sx. need ipp for safety and stabilization.
38 year old female, domiciled with mother and 18 year old daughter, unemployed, high school educated, with no significant PMH and a PPH of bipolar disorder and polysubstance abuse and suspected borderline personality disorder (per former psychiatrist Dr. Ruiz) who was admitted after her mother called her for being "incoherent" after drinking beer and taking valium in the context of grieving her grandmothers passing. No known suicidality at that time.     #Mood instability  Depakote 500 BID   Seroquel 100 QHS      #Anxiety   Vistaril PRN     #Birth Control   Xulane patch     #Nicotine cravings   Nicotine gum PRN

## 2019-02-21 VITALS
SYSTOLIC BLOOD PRESSURE: 102 MMHG | TEMPERATURE: 96 F | RESPIRATION RATE: 16 BRPM | HEART RATE: 80 BPM | DIASTOLIC BLOOD PRESSURE: 69 MMHG

## 2019-02-21 LAB
FOLATE SERPL-MCNC: 8 NG/ML — SIGNIFICANT CHANGE UP
TSH SERPL-MCNC: 0.9 UIU/ML — SIGNIFICANT CHANGE UP (ref 0.27–4.2)
VALPROATE SERPL-MCNC: 31 UG/ML — LOW (ref 50–100)
VIT B12 SERPL-MCNC: 421 PG/ML — SIGNIFICANT CHANGE UP (ref 232–1245)

## 2019-02-21 RX ORDER — QUETIAPINE FUMARATE 200 MG/1
1 TABLET, FILM COATED ORAL
Qty: 14 | Refills: 0
Start: 2019-02-21 | End: 2019-03-06

## 2019-02-21 RX ORDER — DIVALPROEX SODIUM 500 MG/1
1 TABLET, DELAYED RELEASE ORAL
Qty: 28 | Refills: 0
Start: 2019-02-21 | End: 2019-03-06

## 2019-02-21 RX ADMIN — Medication 50 MILLIGRAM(S): at 03:25

## 2019-02-21 RX ADMIN — NORELGESTROMIN AND ETHINYL ESTRADIOL 1 PATCH: 150; 35 PATCH TRANSDERMAL at 09:26

## 2019-02-21 RX ADMIN — DIVALPROEX SODIUM 500 MILLIGRAM(S): 500 TABLET, DELAYED RELEASE ORAL at 09:24

## 2019-02-21 RX ADMIN — Medication 2 MILLIGRAM(S): at 10:16

## 2019-02-21 RX ADMIN — Medication 0.5 MILLIGRAM(S): at 02:28

## 2019-02-21 RX ADMIN — Medication 1 APPLICATORFUL: at 02:29

## 2019-02-21 NOTE — PROGRESS NOTE ADULT - SUBJECTIVE AND OBJECTIVE BOX
pt stable alert in NAD  no new complaints    BIPOLAR DISORDER,CURR EPISODE MIXED  ^AMS  No pertinent family history in first degree relatives  Handoff  MEWS Score  Insomnia  Bipolar disorder  Arthritis  No pertinent past medical history  Bipolar disorder, curr episode mixed, severe, with psychotic features  Insomnia  Arthritis  Bipolar disorder, curr episode mixed, severe, with psychotic features  Bipolar disorder, current episode mixed, severe, with psychotic features  History of surgery  Fetal death from pregnancy termination  History of  delivery  AMS  41    HEALTH ISSUES - PROBLEM Dx:  Insomnia: Insomnia  Arthritis: Arthritis  Bipolar disorder, curr episode mixed, severe, with psychotic features  Bipolar disorder, current episode mixed, severe, with psychotic features: Bipolar disorder, current episode mixed, severe, with psychotic features        PAST MEDICAL & SURGICAL HISTORY:  Insomnia  Bipolar disorder: pt stopped lithium 1-1.5 mos ago  Arthritis: OA  History of surgery: carpal tunnel release x2  last 5 y ago  Fetal death from pregnancy termination: x4-5  History of  delivery    ibuprofen (Rash)  TraZODone Hydrochloride (Rash)      FAMILY HISTORY:  No pertinent family history in first degree relatives      acetaminophen   Tablet .. 650 milliGRAM(s) Oral every 6 hours PRN  clonazePAM Tablet 0.5 milliGRAM(s) Oral every 12 hours PRN  clotrimazole 2% Vaginal Cream 1 Applicatorful Vaginal daily  diphenhydrAMINE 50 milliGRAM(s) Oral every 6 hours PRN  diVALproex  milliGRAM(s) Oral two times a day  ethinyl  estradiol-norelgestromin (XULANE) Patch 1 Patch Transdermal every 7 days  hydrocortisone 1% Cream 1 Application(s) Topical every 12 hours PRN  influenza   Vaccine 0.5 milliLiter(s) IntraMuscular once  nicotine  Polacrilex Gum 2 milliGRAM(s) Oral every 6 hours PRN  QUEtiapine 100 milliGRAM(s) Oral at bedtime      T(C): 35.6 (19 @ 08:40), Max: 36.4 (19 @ 16:19)  HR: 80 (19 @ 08:40) (76 - 87)  BP: 102/69 (19 @ 08:40) (102/69 - 148/70)  RR: 16 (19 @ 08:40) (16 - 20)  SpO2: --    PE;  general:  stable no acute cheages in nad    Lungs:    Heart:    EXT:    Neuro:  aelrt no deficits                          CAPILLARY BLOOD GLUCOSE

## 2019-02-21 NOTE — CHART NOTE - NSCHARTNOTEFT_GEN_A_CORE
Social Work Discharge Note:    Patient is for discharge today.   She is alert and oriented x3.  Mood is improved.  Anxiety has decreased.  Insight and judgment have improved.  Suicidal / homicidal ideation denied.      Patient will be discharged to her home in [place] with her family.  Plan is for referral to [facility] where patient will resume treatment with her therapist and psychiatrist.  She is aware and agreeable to same.      Patient is aware and agreeable to discharge today.

## 2019-02-25 DIAGNOSIS — F60.3 BORDERLINE PERSONALITY DISORDER: ICD-10-CM

## 2019-02-25 DIAGNOSIS — F13.14 SEDATIVE, HYPNOTIC OR ANXIOLYTIC ABUSE WITH SEDATIVE, HYPNOTIC OR ANXIOLYTIC-INDUCED MOOD DISORDER: ICD-10-CM

## 2019-02-25 DIAGNOSIS — Z78.1 PHYSICAL RESTRAINT STATUS: ICD-10-CM

## 2019-02-25 DIAGNOSIS — F31.64 BIPOLAR DISORDER, CURRENT EPISODE MIXED, SEVERE, WITH PSYCHOTIC FEATURES: ICD-10-CM

## 2019-02-25 DIAGNOSIS — G47.00 INSOMNIA, UNSPECIFIED: ICD-10-CM

## 2019-02-25 DIAGNOSIS — B37.9 CANDIDIASIS, UNSPECIFIED: ICD-10-CM

## 2019-02-25 DIAGNOSIS — F10.14 ALCOHOL ABUSE WITH ALCOHOL-INDUCED MOOD DISORDER: ICD-10-CM

## 2019-02-25 DIAGNOSIS — L27.1 LOCALIZED SKIN ERUPTION DUE TO DRUGS AND MEDICAMENTS TAKEN INTERNALLY: ICD-10-CM

## 2019-02-25 DIAGNOSIS — T45.0X5A ADVERSE EFFECT OF ANTIALLERGIC AND ANTIEMETIC DRUGS, INITIAL ENCOUNTER: ICD-10-CM

## 2019-02-25 DIAGNOSIS — F14.14 COCAINE ABUSE WITH COCAINE-INDUCED MOOD DISORDER: ICD-10-CM

## 2019-02-25 DIAGNOSIS — F17.210 NICOTINE DEPENDENCE, CIGARETTES, UNCOMPLICATED: ICD-10-CM

## 2019-02-25 DIAGNOSIS — Z91.14 PATIENT'S OTHER NONCOMPLIANCE WITH MEDICATION REGIMEN: ICD-10-CM

## 2019-02-25 DIAGNOSIS — M19.90 UNSPECIFIED OSTEOARTHRITIS, UNSPECIFIED SITE: ICD-10-CM

## 2019-02-25 DIAGNOSIS — F31.9 BIPOLAR DISORDER, UNSPECIFIED: ICD-10-CM

## 2019-02-25 DIAGNOSIS — F90.9 ATTENTION-DEFICIT HYPERACTIVITY DISORDER, UNSPECIFIED TYPE: ICD-10-CM

## 2019-02-25 DIAGNOSIS — Z88.6 ALLERGY STATUS TO ANALGESIC AGENT: ICD-10-CM

## 2019-05-25 ENCOUNTER — EMERGENCY (EMERGENCY)
Facility: HOSPITAL | Age: 39
LOS: 0 days | Discharge: LEFT BEFORE DISCHARGE | End: 2019-05-25
Attending: EMERGENCY MEDICINE | Admitting: EMERGENCY MEDICINE
Payer: MEDICAID

## 2019-05-25 DIAGNOSIS — Z79.899 OTHER LONG TERM (CURRENT) DRUG THERAPY: ICD-10-CM

## 2019-05-25 DIAGNOSIS — F10.129 ALCOHOL ABUSE WITH INTOXICATION, UNSPECIFIED: ICD-10-CM

## 2019-05-25 DIAGNOSIS — Z98.890 OTHER SPECIFIED POSTPROCEDURAL STATES: Chronic | ICD-10-CM

## 2019-05-25 DIAGNOSIS — Z88.8 ALLERGY STATUS TO OTHER DRUGS, MEDICAMENTS AND BIOLOGICAL SUBSTANCES: ICD-10-CM

## 2019-05-25 DIAGNOSIS — Z98.891 HISTORY OF UTERINE SCAR FROM PREVIOUS SURGERY: Chronic | ICD-10-CM

## 2019-05-25 DIAGNOSIS — Z33.2 ENCOUNTER FOR ELECTIVE TERMINATION OF PREGNANCY: Chronic | ICD-10-CM

## 2019-05-25 DIAGNOSIS — F31.9 BIPOLAR DISORDER, UNSPECIFIED: ICD-10-CM

## 2019-05-25 PROCEDURE — 99283 EMERGENCY DEPT VISIT LOW MDM: CPT | Mod: 25

## 2019-05-25 NOTE — ED PROVIDER NOTE - ATTENDING CONTRIBUTION TO CARE
I was present for and supervised the key and critical aspects of the procedures performed during the care of the patient. Patient ambulating well, no chest pain, no sob she is not homicidal or suicidal.

## 2019-05-25 NOTE — ED ADULT TRIAGE NOTE - CHIEF COMPLAINT QUOTE
BIBA for alcohol intoxication, patient denies suicidal or homicidal ideations, patient with steady gait

## 2019-05-25 NOTE — ED PROVIDER NOTE - OBJECTIVE STATEMENT
Patient is a 37 yo f who presents with alcohol intoxication she denies any chest pain or shortness of breath she denies any suicidal ideation or homicidal ideation.
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
## 2019-11-08 NOTE — ED ADULT NURSE NOTE - PRO INTERPRETER NEED 2
English
Is This A New Presentation, Or A Follow-Up?: Skin Lesions
How Severe Is Your Skin Lesion?: moderate

## 2019-12-24 ENCOUNTER — EMERGENCY (EMERGENCY)
Facility: HOSPITAL | Age: 39
LOS: 0 days | Discharge: HOME | End: 2019-12-24
Attending: EMERGENCY MEDICINE | Admitting: EMERGENCY MEDICINE
Payer: MEDICAID

## 2019-12-24 VITALS
HEIGHT: 62 IN | RESPIRATION RATE: 18 BRPM | SYSTOLIC BLOOD PRESSURE: 145 MMHG | HEART RATE: 99 BPM | TEMPERATURE: 98 F | DIASTOLIC BLOOD PRESSURE: 116 MMHG | WEIGHT: 175.05 LBS

## 2019-12-24 VITALS
SYSTOLIC BLOOD PRESSURE: 143 MMHG | RESPIRATION RATE: 18 BRPM | OXYGEN SATURATION: 100 % | HEART RATE: 97 BPM | DIASTOLIC BLOOD PRESSURE: 110 MMHG

## 2019-12-24 DIAGNOSIS — S01.511A LACERATION WITHOUT FOREIGN BODY OF LIP, INITIAL ENCOUNTER: ICD-10-CM
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
## 2019-12-24 DIAGNOSIS — Z88.8 ALLERGY STATUS TO OTHER DRUGS, MEDICAMENTS AND BIOLOGICAL SUBSTANCES STATUS: ICD-10-CM
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
## 2019-12-24 DIAGNOSIS — Z98.890 OTHER SPECIFIED POSTPROCEDURAL STATES: Chronic | ICD-10-CM

## 2019-12-24 DIAGNOSIS — Z33.2 ENCOUNTER FOR ELECTIVE TERMINATION OF PREGNANCY: Chronic | ICD-10-CM
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
## 2019-12-24 DIAGNOSIS — Z98.891 HISTORY OF UTERINE SCAR FROM PREVIOUS SURGERY: Chronic | ICD-10-CM
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
## 2019-12-24 DIAGNOSIS — Z23 ENCOUNTER FOR IMMUNIZATION: ICD-10-CM

## 2019-12-24 PROCEDURE — 12011 RPR F/E/E/N/L/M 2.5 CM/<: CPT

## 2019-12-24 PROCEDURE — 99283 EMERGENCY DEPT VISIT LOW MDM: CPT | Mod: 25

## 2019-12-24 RX ORDER — TETANUS TOXOID, REDUCED DIPHTHERIA TOXOID AND ACELLULAR PERTUSSIS VACCINE, ADSORBED 5; 2.5; 8; 8; 2.5 [IU]/.5ML; [IU]/.5ML; UG/.5ML; UG/.5ML; UG/.5ML
0.5 SUSPENSION INTRAMUSCULAR ONCE
Refills: 0 | Status: COMPLETED | OUTPATIENT
Start: 2019-12-24 | End: 2019-12-24

## 2019-12-24 RX ADMIN — TETANUS TOXOID, REDUCED DIPHTHERIA TOXOID AND ACELLULAR PERTUSSIS VACCINE, ADSORBED 0.5 MILLILITER(S): 5; 2.5; 8; 8; 2.5 SUSPENSION INTRAMUSCULAR at 19:50

## 2019-12-24 NOTE — ED PROVIDER NOTE - CLINICAL SUMMARY MEDICAL DECISION MAKING FREE TEXT BOX
Lac repaired by BHARGAV Luna. Pt toelrated procedure well. Full DC instructions discussed and patient knows when to seek immediate medical attention. Patient has proper follow-up. All results discussed with the patient they may require further work-up. Limitations of ED work-up discussed. All  questions and concerns from patient or family addressed. Understanding of insturctions verbalized

## 2019-12-24 NOTE — ED ADULT NURSE NOTE - NSIMPLEMENTINTERV_GEN_ALL_ED
Implemented All Universal Safety Interventions:  [place] to call system. Call bell, personal items and telephone within reach. Instruct patient to call for assistance. Room bathroom lighting operational. Non-slip footwear when patient is off stretcher. Physically safe environment: no spills, clutter or unnecessary equipment. Stretcher in lowest position, wheels locked, appropriate side rails in place.

## 2019-12-24 NOTE — ED PROVIDER NOTE - OBJECTIVE STATEMENT
38 yo female, no pmh, presents to ed for right upper lip lac. occiured tonight 2/2 hot food. pt unsure of tetanus status. no other complaint. no bleeding. denies numbness, tingling.

## 2019-12-24 NOTE — ED PROVIDER NOTE - PHYSICAL EXAMINATION
Physical Exam    Vital Signs: I have reviewed the initial vital signs.  Constitutional: well-nourished, appears stated age, no acute distress  Eyes: Conjunctiva pink, Sclera clear  Skin: lac to right upper lip without bleeding  Neurologic: awake, alert, nvi

## 2019-12-24 NOTE — ED PROVIDER NOTE - NS ED ROS FT
Constitutional: (-) weakness  Integumentary: (+) lac  Neurological: (-) tingling, (-)numbness,  Allergic/Immunologic: (-) pruritus

## 2019-12-24 NOTE — ED PROVIDER NOTE - PATIENT PORTAL LINK FT
You can access the FollowMyHealth Patient Portal offered by [facility] by registering at the following website: [email or web address]. By joining [facility]’s FollowMyHealth portal, you will also be able to view your health information using other applications (apps) compatible with our system.

## 2020-01-31 ENCOUNTER — EMERGENCY (EMERGENCY)
Facility: HOSPITAL | Age: 40
LOS: 0 days | Discharge: LEFT BEFORE DISCHARGE | End: 2020-02-01
Attending: EMERGENCY MEDICINE | Admitting: EMERGENCY MEDICINE
Payer: MEDICAID

## 2020-01-31 DIAGNOSIS — F10.99 ALCOHOL USE, UNSPECIFIED WITH UNSPECIFIED ALCOHOL-INDUCED DISORDER: ICD-10-CM

## 2020-01-31 DIAGNOSIS — Z98.891 HISTORY OF UTERINE SCAR FROM PREVIOUS SURGERY: Chronic | ICD-10-CM

## 2020-01-31 DIAGNOSIS — Z98.890 OTHER SPECIFIED POSTPROCEDURAL STATES: Chronic | ICD-10-CM

## 2020-01-31 DIAGNOSIS — Z33.2 ENCOUNTER FOR ELECTIVE TERMINATION OF PREGNANCY: Chronic | ICD-10-CM

## 2020-01-31 DIAGNOSIS — Z88.8 ALLERGY STATUS TO OTHER DRUGS, MEDICAMENTS AND BIOLOGICAL SUBSTANCES STATUS: ICD-10-CM

## 2020-01-31 PROCEDURE — L9991: CPT

## 2020-02-01 VITALS
TEMPERATURE: 97 F | SYSTOLIC BLOOD PRESSURE: 130 MMHG | OXYGEN SATURATION: 95 % | HEART RATE: 82 BPM | DIASTOLIC BLOOD PRESSURE: 65 MMHG | HEIGHT: 62 IN | WEIGHT: 175.05 LBS | RESPIRATION RATE: 16 BRPM
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
## 2020-02-25 NOTE — ED PROVIDER NOTE - ATTENDING CONTRIBUTION TO CARE
I personally evaluated the patient. I reviewed the Resident’s or Physician Assistant’s note (as assigned above), and agree with the findings and plan except as documented in my note.    38 yo female, no pmh, presents to ed for right upper lip lac. occured tonight after biting on the lip while eating dinner. No other injuries. On exam- small laceration to the lower lip.     Plan- tdap update and lac repair. no diarrhea/no vomiting/no nausea

## 2020-02-28 NOTE — ED PROVIDER NOTE - NS HIV RISK FACTOR YES
Incoming from pt. Two patient Identifiers confirmed. Advised he has been having continued sob and fatigue. Pt stated yesterday he has also been having clammy feeling, lightheadedness and nausea\"  Pt stated he did not go to ER yesterday but did take a nitro. Pt stated its usually when he is active that he has issues. Advised pt that if he has s/s again he should go to ER for evaluation. [facility] Pt scheduled for Thursday, March 12, 2020 10:00. Declined

## 2020-07-18 ENCOUNTER — RESULT REVIEW (OUTPATIENT)
Age: 40
End: 2020-07-18

## 2020-07-18 ENCOUNTER — INPATIENT (INPATIENT)
Facility: HOSPITAL | Age: 40
LOS: 1 days | Discharge: HOME HEALTH | End: 2020-07-20
Attending: STUDENT IN AN ORGANIZED HEALTH CARE EDUCATION/TRAINING PROGRAM | Admitting: STUDENT IN AN ORGANIZED HEALTH CARE EDUCATION/TRAINING PROGRAM
Payer: MEDICAID

## 2020-07-18 VITALS
HEART RATE: 74 BPM | HEIGHT: 63 IN | WEIGHT: 179.9 LBS | RESPIRATION RATE: 20 BRPM | SYSTOLIC BLOOD PRESSURE: 123 MMHG | TEMPERATURE: 100 F | OXYGEN SATURATION: 100 % | DIASTOLIC BLOOD PRESSURE: 90 MMHG

## 2020-07-18 DIAGNOSIS — Z33.2 ENCOUNTER FOR ELECTIVE TERMINATION OF PREGNANCY: Chronic | ICD-10-CM

## 2020-07-18 DIAGNOSIS — Z98.890 OTHER SPECIFIED POSTPROCEDURAL STATES: Chronic | ICD-10-CM

## 2020-07-18 DIAGNOSIS — Z98.891 HISTORY OF UTERINE SCAR FROM PREVIOUS SURGERY: Chronic | ICD-10-CM

## 2020-07-18 LAB
ALBUMIN SERPL ELPH-MCNC: 4.5 G/DL — SIGNIFICANT CHANGE UP (ref 3.5–5.2)
ALP SERPL-CCNC: 90 U/L — SIGNIFICANT CHANGE UP (ref 30–115)
ALT FLD-CCNC: 15 U/L — SIGNIFICANT CHANGE UP (ref 0–41)
ANION GAP SERPL CALC-SCNC: 14 MMOL/L — SIGNIFICANT CHANGE UP (ref 7–14)
APPEARANCE UR: ABNORMAL
APTT BLD: 27 SEC — SIGNIFICANT CHANGE UP (ref 27–39.2)
AST SERPL-CCNC: 14 U/L — SIGNIFICANT CHANGE UP (ref 0–41)
BACTERIA # UR AUTO: ABNORMAL
BASOPHILS # BLD AUTO: 0.04 K/UL — SIGNIFICANT CHANGE UP (ref 0–0.2)
BASOPHILS NFR BLD AUTO: 0.2 % — SIGNIFICANT CHANGE UP (ref 0–1)
BILIRUB SERPL-MCNC: 0.9 MG/DL — SIGNIFICANT CHANGE UP (ref 0.2–1.2)
BILIRUB UR-MCNC: ABNORMAL
BLD GP AB SCN SERPL QL: SIGNIFICANT CHANGE UP
BUN SERPL-MCNC: 9 MG/DL — LOW (ref 10–20)
CALCIUM SERPL-MCNC: 9.9 MG/DL — SIGNIFICANT CHANGE UP (ref 8.5–10.1)
CHLORIDE SERPL-SCNC: 97 MMOL/L — LOW (ref 98–110)
CO2 SERPL-SCNC: 26 MMOL/L — SIGNIFICANT CHANGE UP (ref 17–32)
COLOR SPEC: ABNORMAL
CREAT SERPL-MCNC: 0.9 MG/DL — SIGNIFICANT CHANGE UP (ref 0.7–1.5)
DIFF PNL FLD: ABNORMAL
EOSINOPHIL # BLD AUTO: 0.01 K/UL — SIGNIFICANT CHANGE UP (ref 0–0.7)
EOSINOPHIL NFR BLD AUTO: 0 % — SIGNIFICANT CHANGE UP (ref 0–8)
EPI CELLS # UR: ABNORMAL /HPF
GLUCOSE SERPL-MCNC: 144 MG/DL — HIGH (ref 70–99)
GLUCOSE UR QL: NEGATIVE MG/DL — SIGNIFICANT CHANGE UP
HCG SERPL QL: NEGATIVE — SIGNIFICANT CHANGE UP
HCT VFR BLD CALC: 44.2 % — SIGNIFICANT CHANGE UP (ref 37–47)
HGB BLD-MCNC: 15.2 G/DL — SIGNIFICANT CHANGE UP (ref 12–16)
IMM GRANULOCYTES NFR BLD AUTO: 0.7 % — HIGH (ref 0.1–0.3)
INR BLD: 1.01 RATIO — SIGNIFICANT CHANGE UP (ref 0.65–1.3)
KETONES UR-MCNC: 160
LACTATE SERPL-SCNC: 1.4 MMOL/L — SIGNIFICANT CHANGE UP (ref 0.7–2)
LEUKOCYTE ESTERASE UR-ACNC: NEGATIVE — SIGNIFICANT CHANGE UP
LIDOCAIN IGE QN: 18 U/L — SIGNIFICANT CHANGE UP (ref 7–60)
LYMPHOCYTES # BLD AUTO: 0.83 K/UL — LOW (ref 1.2–3.4)
LYMPHOCYTES # BLD AUTO: 3.5 % — LOW (ref 20.5–51.1)
MCHC RBC-ENTMCNC: 31.1 PG — HIGH (ref 27–31)
MCHC RBC-ENTMCNC: 34.4 G/DL — SIGNIFICANT CHANGE UP (ref 32–37)
MCV RBC AUTO: 90.4 FL — SIGNIFICANT CHANGE UP (ref 81–99)
MONOCYTES # BLD AUTO: 0.91 K/UL — HIGH (ref 0.1–0.6)
MONOCYTES NFR BLD AUTO: 3.9 % — SIGNIFICANT CHANGE UP (ref 1.7–9.3)
NEUTROPHILS # BLD AUTO: 21.57 K/UL — HIGH (ref 1.4–6.5)
NEUTROPHILS NFR BLD AUTO: 91.7 % — HIGH (ref 42.2–75.2)
NITRITE UR-MCNC: POSITIVE
NRBC # BLD: 0 /100 WBCS — SIGNIFICANT CHANGE UP (ref 0–0)
PH UR: 6 — SIGNIFICANT CHANGE UP (ref 5–8)
PLATELET # BLD AUTO: 300 K/UL — SIGNIFICANT CHANGE UP (ref 130–400)
POTASSIUM SERPL-MCNC: 4.2 MMOL/L — SIGNIFICANT CHANGE UP (ref 3.5–5)
POTASSIUM SERPL-SCNC: 4.2 MMOL/L — SIGNIFICANT CHANGE UP (ref 3.5–5)
PROT SERPL-MCNC: 7.8 G/DL — SIGNIFICANT CHANGE UP (ref 6–8)
PROT UR-MCNC: 100 MG/DL
PROTHROM AB SERPL-ACNC: 11.6 SEC — SIGNIFICANT CHANGE UP (ref 9.95–12.87)
RBC # BLD: 4.89 M/UL — SIGNIFICANT CHANGE UP (ref 4.2–5.4)
RBC # FLD: 12.1 % — SIGNIFICANT CHANGE UP (ref 11.5–14.5)
RBC CASTS # UR COMP ASSIST: ABNORMAL /HPF
SARS-COV-2 RNA SPEC QL NAA+PROBE: SIGNIFICANT CHANGE UP
SODIUM SERPL-SCNC: 137 MMOL/L — SIGNIFICANT CHANGE UP (ref 135–146)
SP GR SPEC: 1.02 — SIGNIFICANT CHANGE UP (ref 1.01–1.03)
UROBILINOGEN FLD QL: 1 MG/DL (ref 0.2–0.2)
WBC # BLD: 23.52 K/UL — HIGH (ref 4.8–10.8)
WBC # FLD AUTO: 23.52 K/UL — HIGH (ref 4.8–10.8)
WBC UR QL: SIGNIFICANT CHANGE UP /HPF

## 2020-07-18 PROCEDURE — 99285 EMERGENCY DEPT VISIT HI MDM: CPT

## 2020-07-18 PROCEDURE — 99283 EMERGENCY DEPT VISIT LOW MDM: CPT

## 2020-07-18 PROCEDURE — 74177 CT ABD & PELVIS W/CONTRAST: CPT | Mod: 26

## 2020-07-18 PROCEDURE — 71045 X-RAY EXAM CHEST 1 VIEW: CPT | Mod: 26

## 2020-07-18 RX ORDER — SODIUM CHLORIDE 9 MG/ML
1000 INJECTION INTRAMUSCULAR; INTRAVENOUS; SUBCUTANEOUS ONCE
Refills: 0 | Status: COMPLETED | OUTPATIENT
Start: 2020-07-18 | End: 2020-07-18

## 2020-07-18 RX ORDER — MORPHINE SULFATE 50 MG/1
4 CAPSULE, EXTENDED RELEASE ORAL ONCE
Refills: 0 | Status: DISCONTINUED | OUTPATIENT
Start: 2020-07-18 | End: 2020-07-18

## 2020-07-18 RX ORDER — CEFOTETAN DISODIUM 1 G
2 VIAL (EA) INJECTION ONCE
Refills: 0 | Status: COMPLETED | OUTPATIENT
Start: 2020-07-18 | End: 2020-07-18

## 2020-07-18 RX ORDER — MORPHINE SULFATE 50 MG/1
6 CAPSULE, EXTENDED RELEASE ORAL ONCE
Refills: 0 | Status: DISCONTINUED | OUTPATIENT
Start: 2020-07-18 | End: 2020-07-18

## 2020-07-18 RX ORDER — ACETAMINOPHEN 500 MG
975 TABLET ORAL ONCE
Refills: 0 | Status: COMPLETED | OUTPATIENT
Start: 2020-07-18 | End: 2020-07-18

## 2020-07-18 RX ADMIN — SODIUM CHLORIDE 1000 MILLILITER(S): 9 INJECTION INTRAMUSCULAR; INTRAVENOUS; SUBCUTANEOUS at 19:42

## 2020-07-18 RX ADMIN — MORPHINE SULFATE 4 MILLIGRAM(S): 50 CAPSULE, EXTENDED RELEASE ORAL at 21:15

## 2020-07-18 RX ADMIN — MORPHINE SULFATE 6 MILLIGRAM(S): 50 CAPSULE, EXTENDED RELEASE ORAL at 18:46

## 2020-07-18 RX ADMIN — Medication 975 MILLIGRAM(S): at 19:41

## 2020-07-18 RX ADMIN — Medication 100 GRAM(S): at 19:41

## 2020-07-18 RX ADMIN — MORPHINE SULFATE 4 MILLIGRAM(S): 50 CAPSULE, EXTENDED RELEASE ORAL at 23:24

## 2020-07-18 RX ADMIN — Medication 2 GRAM(S): at 21:11

## 2020-07-18 RX ADMIN — SODIUM CHLORIDE 1000 MILLILITER(S): 9 INJECTION INTRAMUSCULAR; INTRAVENOUS; SUBCUTANEOUS at 18:06

## 2020-07-18 RX ADMIN — MORPHINE SULFATE 4 MILLIGRAM(S): 50 CAPSULE, EXTENDED RELEASE ORAL at 19:08

## 2020-07-18 RX ADMIN — MORPHINE SULFATE 6 MILLIGRAM(S): 50 CAPSULE, EXTENDED RELEASE ORAL at 18:06

## 2020-07-18 RX ADMIN — MORPHINE SULFATE 4 MILLIGRAM(S): 50 CAPSULE, EXTENDED RELEASE ORAL at 19:42

## 2020-07-18 NOTE — ED PROVIDER NOTE - OBJECTIVE STATEMENT
38 yo F hx of bipolar c/o constant, severe, sharp lower abdominal pains since yesterday. No f/c/n/v/c/d. No hematuria or dysuria. LMP 7/15/20

## 2020-07-18 NOTE — ED PROVIDER NOTE - PHYSICAL EXAMINATION
Gen: Alert, NAD, well appearing  Head: NC, AT, PERRL, EOMI, normal lids/conjunctiva  ENT: normal hearing  Neck: +supple, no tenderness/meningismus,  Pulm: Bilateral BS, normal resp effort, no wheeze/stridor/retractions  CV: RRR, no murmer  Abd: soft, tender to palpation diffusely to abdomen, most over lower quadrants.  Mskel: no edema/erythema/cyanosis  Skin: no rash, warm/dry  Neuro: AAOx3, no sensory/motor deficits

## 2020-07-18 NOTE — H&P ADULT - ASSESSMENT
Acute appendicitis  NPO  IVF  IV  ABX-CEFOTETAN   PAIN MANAGEMENT  ZOFRAN PRN NAUSEA  DVT/ GI PROPHYLAXIS

## 2020-07-18 NOTE — H&P ADULT - HISTORY OF PRESENT ILLNESS
· Chief Complaint: The patient is a 39y Female complaining of abdominal pain.	  · HPI Objective Statement: 38 yo F hx of bipolar  do, psh c section, finger  surgery, presents with c/o constant, severe, sharp lower abdominal pains since yesterday. No f/c/n/v/c/d. No hematuria or dysuria. LMP 7/15/20	      PAST MEDICAL/SURGICAL/FAMILY/SOCIAL HISTORY:    Past Medical History:  Arthritis  OA  Bipolar disorder  pt stopped lithium 1-1.5 mos ago  Insomnia.     Tobacco Usage:  · Tobacco Usage	Never smoker	    ALLERGIES AND HOME MEDICATIONS:   Allergies:        Allergies:  	ibuprofen: Drug, Rash  	TraZODone Hydrochloride: Drug, 16-Feb-2019, Rash, as per pt report    Home Medications:   * Incomplete Medication History as of 18-Jul-2020 18:23 documented in Structured Notes      VITAL SIGNS ([place]):    ,,ED ADULT Flow Sheet:    18-Jul-2020 17:38	  · Temp (F): 99.8	  · Temp (C) Temp (C): 37.7	  · Temp site Temp Site: oral	  · Heart Rate Heart Rate (beats/min): 74	  · BP Systolic Systolic: 123	  · BP Diastolic Diastolic (mm Hg): 90	  · Respiration Rate (breaths/min) Respiration Rate (breaths/min): 20	  · SpO2 (%) SpO2 (%): 100	  · O2 Delivery/Oxygen Delivery Method Patient On (Patient Delivery Method): room air	  · How was the weight captured? Weight Type/Method: stated	  · Dosing Weight (KILOGRAMS) Dosing Weight (KILOGRAMS): 81.6	  · Dosing Weight  (POUNDS) Dosing Weight (POUNDS): 179.8	  · Height type Height Type: stated	  · Height (FEET) Height (FEET): 5	  · Height (INCHES) Height (INCHES): 3	  · Height (CENTIMETERS) Height (CENTIMETERS): 160.02	  · BSA (m2): 1.85	  · BMI (kG/m2) BMI (kG/m2): 31.9	  · Ideal Body Weight(kg) Ideal Body Weight(kg): 52	  · Presence of Pain: complains of pain/discomfort	  · Pain Rating (0-10): Rest: 9	  · Pain Rating (0-10): Activity: 9	  · SpO2 (%) SpO2 (%): 100	  · Preferred Language to Address Healthcare Preferred Language to Address Healthcare: English	    PHYSICAL EXAM:   · Physical Examination: Gen: Alert, NAD, well appearing  Head: NC, AT, PERRL, EOMI, normal lids/conjunctiva  ENT: normal hearing  Neck: +supple, no tenderness/meningismus,  Pulm: Bilateral BS, normal resp effort, no wheeze/stridor/retractions  CV: RRR, no murmer  Abd: soft, tender to palpation diffusely to abdomen, most over lower quadrants.  Mskel: no edema/erythema/cyanosis  Skin: no rash, warm/dry Neuro: AAOx3, no sensory/motor deficits	    CURRENT ORDERS/:   · 	morphine  - Injectable, 4 milliGRAM(s), IV Push, Once, Stop After 1 Doses  	Administration Instructions: This is a Look-alike/Sound-alike Medication  	Provider's Contact #: [phone], 18-Jul-2020, Active, 18-Jul-2020, Standard  · 	Diet, NPO (ED use only), 18-Jul-2020, Active, Standard    RESULTS:   · EKG Date/Time: 18-Jul-2020 17:58	  · Rate: 92	  · Interpretation: normal sinus rhythm	  · IL: 134	  · QRS: 72	  · ST/T Wave: no stemi	  · Other Findings: qt/qtc 344/425	                        15.2   23.52 )-----------( 300      ( 18 Jul 2020 17:48 )             44.2   07-18    137  |  97<L>  |  9<L>  ----------------------------<  144<H>  4.2   |  26  |  0.9    Ca    9.9      18 Jul 2020 17:48    TPro  7.8  /  Alb  4.5  /  TBili  0.9  /  DBili  x   /  AST  14  /  ALT  15  /  AlkPhos  90  07-18  EXAM:  CT ABDOMEN AND PELVIS IC            PROCEDURE DATE:  07/18/2020            INTERPRETATION:  CLINICAL HISTORY / REASON FOR EXAM:  right lower quadrant abdominal pain     TECHNIQUE: Contiguous axial CT images were obtained from the lower chestto the pubic symphysis following administration of Optiray 320 intravenous contrast. Oral contrast was not administered. Reformatted images in the coronal and sagittal planes were acquired.    COMPARISON: CT ABDOMEN/PELVIS 10/16/2017.    FINDINGS:    LOWER CHEST: Unremarkable.    HEPATOBILIARY: Stable subcentimeter hypodensity too small to further characterize.     SPLEEN: Unremarkable.    PANCREAS: Unremarkable.    ADRENAL GLANDS: Unremarkable.    KIDNEYS: Unremarkable.    ABDOMINOPELVIC NODES: Unremarkable.    PELVIC ORGANS: Small volume free pelvic fluid, likely physiologic.    PERITONEUM/MESENTERY/BOWEL: No bowel obstruction, ascites or intraperitoneal free air. The appendix is dilated measuring about 1 cm in diameter with surrounding inflammatory changes consistent with acute appendicitis. No abscess formation is seen..    BONES/SOFT TISSUES: No acute osseous abnormality.    OTHER: Aorta is normal in caliber.    IMPRESSION:    Findings consistent with acute appendicitis              CIPRIANO CHILEL M.D., RESIDENT RADIOLOGIST  This document has been electronically signed.  JOYCELYN SOLIZ M.D., ATTENDING RADIOLOGIST  This document has been electronically signed. Jul 18 2020  7:39PM

## 2020-07-18 NOTE — H&P ADULT - ATTENDING COMMENTS
40yo female with bipolar disorder presenting to [facility] with abdominal pain. WBC 23. CT A/P demonstrate acute, non-perforated appendicitis. Transferred to [facility] for surgical intervention. Planning for laparoscopic appendectomy, possible open. NPO, IV fluids, IV antibiotics, pain medication. 40yo female with bipolar disorder, previous  presenting to [facility] with abdominal pain. WBC 23. CT A/P demonstrate acute, non-perforated appendicitis. Transferred to [facility] for surgical intervention. Patient has localized peritonitis on exam. Planning for laparoscopic appendectomy, possible open. NPO, IV fluids, IV antibiotics, pain medication.

## 2020-07-18 NOTE — H&P ADULT - NSICDXPASTMEDICALHX_GEN_ALL_CORE_FT
PAST MEDICAL HISTORY:  Arthritis OA    Bipolar disorder pt stopped lithium 1-1.5 mos ago    Insomnia

## 2020-07-18 NOTE — H&P ADULT - NSHPLABSRESULTS_GEN_ALL_CORE
15.2   23.52 )-----------( 300      ( 18 Jul 2020 17:48 )             44.2   07-18    137  |  97<L>  |  9<L>  ----------------------------<  144<H>  4.2   |  26  |  0.9    Ca    9.9      18 Jul 2020 17:48    TPro  7.8  /  Alb  4.5  /  TBili  0.9  /  DBili  x   /  AST  14  /  ALT  15  /  AlkPhos  90  07-18  S IC            PROCEDURE DATE:  07/18/2020            INTERPRETATION:  CLINICAL HISTORY / REASON FOR EXAM:  right lower quadrant abdominal pain     TECHNIQUE: Contiguous axial CT images were obtained from the lower chestto the pubic symphysis following administration of Optiray 320 intravenous contrast. Oral contrast was not administered. Reformatted images in the coronal and sagittal planes were acquired.    COMPARISON: CT ABDOMEN/PELVIS 10/16/2017.    FINDINGS:    LOWER CHEST: Unremarkable.    HEPATOBILIARY: Stable subcentimeter hypodensity too small to further characterize.     SPLEEN: Unremarkable.    PANCREAS: Unremarkable.    ADRENAL GLANDS: Unremarkable.    KIDNEYS: Unremarkable.    ABDOMINOPELVIC NODES: Unremarkable.    PELVIC ORGANS: Small volume free pelvic fluid, likely physiologic.    PERITONEUM/MESENTERY/BOWEL: No bowel obstruction, ascites or intraperitoneal free air. The appendix is dilated measuring about 1 cm in diameter with surrounding inflammatory changes consistent with acute appendicitis. No abscess formation is seen..    BONES/SOFT TISSUES: No acute osseous abnormality.    OTHER: Aorta is normal in caliber.    IMPRESSION:    Findings consistent with acute appendicitis              CIPRIANO CHILEL M.D., RESIDENT RADIOLOGIST  This document has been electronically signed.  JOYCELYN SOLIZ M.D., ATTENDING RADIOLOGIST  This document has been electronically signed. Jul 18 2020  7:39PM

## 2020-07-18 NOTE — H&P ADULT - NSICDXPASTSURGICALHX_GEN_ALL_CORE_FT
PAST SURGICAL HISTORY:  Fetal death from pregnancy termination x4-5    History of  delivery     History of surgery carpal tunnel release x2  last 5 y ago

## 2020-07-18 NOTE — ED PROVIDER NOTE - ATTENDING CONTRIBUTION TO CARE
40yo F with PMHx gastritis, bipolar disorder,  x1, presents for abd pain since yesterday, began periumbilically and now RLQ, constant, sharp, severe, nonradiating. Assoc with nausea and subjective fever. Denies chills, vomiting, diarrhea, dysuria, hematuria. Denies headache, lightheadedness, CP, SOB, cough, leg swelling.     Vital signs reviewed  GENERAL: Patient nontoxic appearing, NAD  HEAD: NCAT  EYES: Anicteric  ENT: MMM  RESPIRATORY: Normal respiratory effort. CTA B/L. No wheezing, rales, rhonchi  CARDIOVASCULAR: Regular rate and rhythm  ABDOMEN: Soft. Nondistended. RLQ TTP. +guarding. No rebound. No CVA tenderness.  MUSCULOSKELETAL/EXTREMITIES: Brisk cap refill. Equal radial pulses.   SKIN:  Warm and dry  NEURO: AAOx3. No gross FND. 38yo F with PMHx gastritis, bipolar disorder,  x1, presents for abd pain since yesterday, began periumbilically and now RLQ, constant, sharp, severe, nonradiating. Assoc with nausea and subjective fever. Denies chills, vomiting, diarrhea, dysuria, hematuria. Denies headache, lightheadedness, CP, SOB, cough, leg swelling.     Vital signs reviewed  GENERAL: Patient nontoxic appearing, NAD  HEAD: NCAT  EYES: Anicteric  ENT: MMM  RESPIRATORY: Normal respiratory effort. CTA B/L. No wheezing, rales, rhonchi  CARDIOVASCULAR: Regular rate and rhythm  ABDOMEN: Soft. Nondistended. Severe RLQ TTP. +guarding. No rebound. No CVA tenderness.  MUSCULOSKELETAL/EXTREMITIES: Brisk cap refill. Equal radial pulses.   SKIN:  Warm and dry  NEURO: AAOx3. No gross FND.

## 2020-07-18 NOTE — ED PROVIDER NOTE - PROGRESS NOTE DETAILS
home [facility] [place] Per BHARGAV Kessler, transfer [place] ED-ED. Dr. Rubio accepts daisha - Surg BHARGAV Kessler evaluated pt. States Dr. Tellez at [facility] doesn't do laparoscopic procedures so pt should be transferred [place]. I d/w Dr. Tellez, states he spoke with [place] surgeon Dr. Scruggs and chief resident Dr. Wyatt regarding transfers. I spoke with Dr. Rubio in ED, aware of transfer. IL: [place], c/o worsening pain. +ttp RLQ. mild guarding. no rebound. dw surgery deacon Rodriguez

## 2020-07-18 NOTE — ED ADULT NURSE REASSESSMENT NOTE - NS ED NURSE REASSESS COMMENT FT1
[place] from [place], VS stable. Pt complaining of lower abdominal pain, will administer med. Pt with b/l 18g AC IV access, intact and flush without difficulty.

## 2020-07-19 LAB
ANION GAP SERPL CALC-SCNC: 18 MMOL/L — HIGH (ref 7–14)
BASOPHILS # BLD AUTO: 0 K/UL — SIGNIFICANT CHANGE UP (ref 0–0.2)
BASOPHILS NFR BLD AUTO: 0 % — SIGNIFICANT CHANGE UP (ref 0–1)
BUN SERPL-MCNC: 6 MG/DL — LOW (ref 10–20)
CALCIUM SERPL-MCNC: 9.5 MG/DL — SIGNIFICANT CHANGE UP (ref 8.5–10.1)
CHLORIDE SERPL-SCNC: 97 MMOL/L — LOW (ref 98–110)
CO2 SERPL-SCNC: 23 MMOL/L — SIGNIFICANT CHANGE UP (ref 17–32)
CREAT SERPL-MCNC: 0.8 MG/DL — SIGNIFICANT CHANGE UP (ref 0.7–1.5)
EOSINOPHIL # BLD AUTO: 0 K/UL — SIGNIFICANT CHANGE UP (ref 0–0.7)
EOSINOPHIL NFR BLD AUTO: 0 % — SIGNIFICANT CHANGE UP (ref 0–8)
GIANT PLATELETS BLD QL SMEAR: PRESENT — SIGNIFICANT CHANGE UP
GLUCOSE SERPL-MCNC: 128 MG/DL — HIGH (ref 70–99)
HCT VFR BLD CALC: 38.4 % — SIGNIFICANT CHANGE UP (ref 37–47)
HGB BLD-MCNC: 13.1 G/DL — SIGNIFICANT CHANGE UP (ref 12–16)
LYMPHOCYTES # BLD AUTO: 1.07 K/UL — LOW (ref 1.2–3.4)
LYMPHOCYTES # BLD AUTO: 4.4 % — LOW (ref 20.5–51.1)
MAGNESIUM SERPL-MCNC: 1.9 MG/DL — SIGNIFICANT CHANGE UP (ref 1.8–2.4)
MANUAL SMEAR VERIFICATION: SIGNIFICANT CHANGE UP
MCHC RBC-ENTMCNC: 31.5 PG — HIGH (ref 27–31)
MCHC RBC-ENTMCNC: 34.1 G/DL — SIGNIFICANT CHANGE UP (ref 32–37)
MCV RBC AUTO: 92.3 FL — SIGNIFICANT CHANGE UP (ref 81–99)
MONOCYTES # BLD AUTO: 0.66 K/UL — HIGH (ref 0.1–0.6)
MONOCYTES NFR BLD AUTO: 2.7 % — SIGNIFICANT CHANGE UP (ref 1.7–9.3)
NEUTROPHILS # BLD AUTO: 22.7 K/UL — HIGH (ref 1.4–6.5)
NEUTROPHILS NFR BLD AUTO: 92.9 % — HIGH (ref 42.2–75.2)
PHOSPHATE SERPL-MCNC: 3 MG/DL — SIGNIFICANT CHANGE UP (ref 2.1–4.9)
PLAT MORPH BLD: NORMAL — SIGNIFICANT CHANGE UP
PLATELET # BLD AUTO: 263 K/UL — SIGNIFICANT CHANGE UP (ref 130–400)
POTASSIUM SERPL-MCNC: 4.5 MMOL/L — SIGNIFICANT CHANGE UP (ref 3.5–5)
POTASSIUM SERPL-SCNC: 4.5 MMOL/L — SIGNIFICANT CHANGE UP (ref 3.5–5)
RBC # BLD: 4.16 M/UL — LOW (ref 4.2–5.4)
RBC # FLD: 12.3 % — SIGNIFICANT CHANGE UP (ref 11.5–14.5)
RBC BLD AUTO: NORMAL — SIGNIFICANT CHANGE UP
SODIUM SERPL-SCNC: 138 MMOL/L — SIGNIFICANT CHANGE UP (ref 135–146)
WBC # BLD: 24.43 K/UL — HIGH (ref 4.8–10.8)
WBC # FLD AUTO: 24.43 K/UL — HIGH (ref 4.8–10.8)

## 2020-07-19 PROCEDURE — 99024 POSTOP FOLLOW-UP VISIT: CPT

## 2020-07-19 PROCEDURE — 88304 TISSUE EXAM BY PATHOLOGIST: CPT | Mod: 26

## 2020-07-19 PROCEDURE — 44970 LAPAROSCOPY APPENDECTOMY: CPT

## 2020-07-19 RX ORDER — DEXTROAMPHETAMINE SACCHARATE, AMPHETAMINE ASPARTATE, DEXTROAMPHETAMINE SULFATE AND AMPHETAMINE SULFATE 1.875; 1.875; 1.875; 1.875 MG/1; MG/1; MG/1; MG/1
20 TABLET ORAL
Refills: 0 | Status: CANCELLED | OUTPATIENT
Start: 2020-07-20 | End: 2020-07-19

## 2020-07-19 RX ORDER — METRONIDAZOLE 500 MG
TABLET ORAL
Refills: 0 | Status: DISCONTINUED | OUTPATIENT
Start: 2020-07-19 | End: 2020-07-20

## 2020-07-19 RX ORDER — SODIUM CHLORIDE 9 MG/ML
1000 INJECTION INTRAMUSCULAR; INTRAVENOUS; SUBCUTANEOUS
Refills: 0 | Status: DISCONTINUED | OUTPATIENT
Start: 2020-07-19 | End: 2020-07-19

## 2020-07-19 RX ORDER — METRONIDAZOLE 500 MG
500 TABLET ORAL EVERY 8 HOURS
Refills: 0 | Status: DISCONTINUED | OUTPATIENT
Start: 2020-07-19 | End: 2020-07-20

## 2020-07-19 RX ORDER — QUETIAPINE FUMARATE 200 MG/1
100 TABLET, FILM COATED ORAL AT BEDTIME
Refills: 0 | Status: DISCONTINUED | OUTPATIENT
Start: 2020-07-19 | End: 2020-07-20

## 2020-07-19 RX ORDER — CIPROFLOXACIN LACTATE 400MG/40ML
400 VIAL (ML) INTRAVENOUS ONCE
Refills: 0 | Status: COMPLETED | OUTPATIENT
Start: 2020-07-19 | End: 2020-07-19

## 2020-07-19 RX ORDER — CHLORHEXIDINE GLUCONATE 213 G/1000ML
1 SOLUTION TOPICAL
Refills: 0 | Status: DISCONTINUED | OUTPATIENT
Start: 2020-07-19 | End: 2020-07-19

## 2020-07-19 RX ORDER — OXYCODONE HYDROCHLORIDE 5 MG/1
5 TABLET ORAL EVERY 6 HOURS
Refills: 0 | Status: DISCONTINUED | OUTPATIENT
Start: 2020-07-19 | End: 2020-07-20

## 2020-07-19 RX ORDER — CIPROFLOXACIN LACTATE 400MG/40ML
400 VIAL (ML) INTRAVENOUS EVERY 12 HOURS
Refills: 0 | Status: DISCONTINUED | OUTPATIENT
Start: 2020-07-20 | End: 2020-07-20

## 2020-07-19 RX ORDER — OXYCODONE HYDROCHLORIDE 5 MG/1
5 TABLET ORAL EVERY 6 HOURS
Refills: 0 | Status: DISCONTINUED | OUTPATIENT
Start: 2020-07-19 | End: 2020-07-19

## 2020-07-19 RX ORDER — METOCLOPRAMIDE HCL 10 MG
10 TABLET ORAL ONCE
Refills: 0 | Status: DISCONTINUED | OUTPATIENT
Start: 2020-07-19 | End: 2020-07-19

## 2020-07-19 RX ORDER — PANTOPRAZOLE SODIUM 20 MG/1
40 TABLET, DELAYED RELEASE ORAL
Refills: 0 | Status: DISCONTINUED | OUTPATIENT
Start: 2020-07-19 | End: 2020-07-19

## 2020-07-19 RX ORDER — CHLORHEXIDINE GLUCONATE 213 G/1000ML
1 SOLUTION TOPICAL
Refills: 0 | Status: DISCONTINUED | OUTPATIENT
Start: 2020-07-19 | End: 2020-07-20

## 2020-07-19 RX ORDER — HEPARIN SODIUM 5000 [USP'U]/ML
5000 INJECTION INTRAVENOUS; SUBCUTANEOUS EVERY 8 HOURS
Refills: 0 | Status: DISCONTINUED | OUTPATIENT
Start: 2020-07-19 | End: 2020-07-19

## 2020-07-19 RX ORDER — MORPHINE SULFATE 50 MG/1
2 CAPSULE, EXTENDED RELEASE ORAL ONCE
Refills: 0 | Status: COMPLETED | OUTPATIENT
Start: 2020-07-19 | End: 2020-07-19

## 2020-07-19 RX ORDER — CIPROFLOXACIN LACTATE 400MG/40ML
VIAL (ML) INTRAVENOUS
Refills: 0 | Status: DISCONTINUED | OUTPATIENT
Start: 2020-07-19 | End: 2020-07-20

## 2020-07-19 RX ORDER — METRONIDAZOLE 500 MG
500 TABLET ORAL ONCE
Refills: 0 | Status: COMPLETED | OUTPATIENT
Start: 2020-07-19 | End: 2020-07-19

## 2020-07-19 RX ORDER — ACETAMINOPHEN 500 MG
650 TABLET ORAL EVERY 6 HOURS
Refills: 0 | Status: DISCONTINUED | OUTPATIENT
Start: 2020-07-19 | End: 2020-07-19

## 2020-07-19 RX ORDER — MIRTAZAPINE 45 MG/1
45 TABLET, ORALLY DISINTEGRATING ORAL AT BEDTIME
Refills: 0 | Status: DISCONTINUED | OUTPATIENT
Start: 2020-07-19 | End: 2020-07-20

## 2020-07-19 RX ORDER — DEXAMETHASONE 0.5 MG/5ML
8 ELIXIR ORAL ONCE
Refills: 0 | Status: DISCONTINUED | OUTPATIENT
Start: 2020-07-19 | End: 2020-07-19

## 2020-07-19 RX ORDER — MIRTAZAPINE 45 MG/1
45 TABLET, ORALLY DISINTEGRATING ORAL AT BEDTIME
Refills: 0 | Status: DISCONTINUED | OUTPATIENT
Start: 2020-07-19 | End: 2020-07-19

## 2020-07-19 RX ORDER — PANTOPRAZOLE SODIUM 20 MG/1
40 TABLET, DELAYED RELEASE ORAL
Refills: 0 | Status: DISCONTINUED | OUTPATIENT
Start: 2020-07-19 | End: 2020-07-20

## 2020-07-19 RX ORDER — LANOLIN ALCOHOL/MO/W.PET/CERES
5 CREAM (GRAM) TOPICAL AT BEDTIME
Refills: 0 | Status: DISCONTINUED | OUTPATIENT
Start: 2020-07-19 | End: 2020-07-20

## 2020-07-19 RX ORDER — CEFOTETAN DISODIUM 1 G
2 VIAL (EA) INJECTION EVERY 12 HOURS
Refills: 0 | Status: DISCONTINUED | OUTPATIENT
Start: 2020-07-19 | End: 2020-07-19

## 2020-07-19 RX ORDER — SODIUM CHLORIDE 9 MG/ML
1000 INJECTION, SOLUTION INTRAVENOUS
Refills: 0 | Status: DISCONTINUED | OUTPATIENT
Start: 2020-07-19 | End: 2020-07-19

## 2020-07-19 RX ORDER — MORPHINE SULFATE 50 MG/1
2 CAPSULE, EXTENDED RELEASE ORAL
Refills: 0 | Status: DISCONTINUED | OUTPATIENT
Start: 2020-07-19 | End: 2020-07-19

## 2020-07-19 RX ORDER — ALBUTEROL 90 UG/1
2 AEROSOL, METERED ORAL EVERY 6 HOURS
Refills: 0 | Status: DISCONTINUED | OUTPATIENT
Start: 2020-07-19 | End: 2020-07-19

## 2020-07-19 RX ORDER — MEPERIDINE HYDROCHLORIDE 50 MG/ML
12.5 INJECTION INTRAMUSCULAR; INTRAVENOUS; SUBCUTANEOUS
Refills: 0 | Status: DISCONTINUED | OUTPATIENT
Start: 2020-07-19 | End: 2020-07-19

## 2020-07-19 RX ORDER — ONDANSETRON 8 MG/1
4 TABLET, FILM COATED ORAL ONCE
Refills: 0 | Status: DISCONTINUED | OUTPATIENT
Start: 2020-07-19 | End: 2020-07-19

## 2020-07-19 RX ORDER — HEPARIN SODIUM 5000 [USP'U]/ML
5000 INJECTION INTRAVENOUS; SUBCUTANEOUS EVERY 8 HOURS
Refills: 0 | Status: DISCONTINUED | OUTPATIENT
Start: 2020-07-19 | End: 2020-07-20

## 2020-07-19 RX ORDER — MORPHINE SULFATE 50 MG/1
4 CAPSULE, EXTENDED RELEASE ORAL
Refills: 0 | Status: DISCONTINUED | OUTPATIENT
Start: 2020-07-19 | End: 2020-07-19

## 2020-07-19 RX ORDER — ACETAMINOPHEN 500 MG
650 TABLET ORAL EVERY 6 HOURS
Refills: 0 | Status: DISCONTINUED | OUTPATIENT
Start: 2020-07-19 | End: 2020-07-20

## 2020-07-19 RX ADMIN — Medication 650 MILLIGRAM(S): at 05:46

## 2020-07-19 RX ADMIN — MORPHINE SULFATE 2 MILLIGRAM(S): 50 CAPSULE, EXTENDED RELEASE ORAL at 04:03

## 2020-07-19 RX ADMIN — HEPARIN SODIUM 5000 UNIT(S): 5000 INJECTION INTRAVENOUS; SUBCUTANEOUS at 05:27

## 2020-07-19 RX ADMIN — Medication 200 MILLIGRAM(S): at 18:52

## 2020-07-19 RX ADMIN — MORPHINE SULFATE 4 MILLIGRAM(S): 50 CAPSULE, EXTENDED RELEASE ORAL at 00:18

## 2020-07-19 RX ADMIN — Medication 100 MILLIGRAM(S): at 17:11

## 2020-07-19 RX ADMIN — Medication 650 MILLIGRAM(S): at 11:37

## 2020-07-19 RX ADMIN — HEPARIN SODIUM 5000 UNIT(S): 5000 INJECTION INTRAVENOUS; SUBCUTANEOUS at 14:18

## 2020-07-19 RX ADMIN — CHLORHEXIDINE GLUCONATE 1 APPLICATION(S): 213 SOLUTION TOPICAL at 05:27

## 2020-07-19 RX ADMIN — Medication 650 MILLIGRAM(S): at 05:27

## 2020-07-19 RX ADMIN — SODIUM CHLORIDE 120 MILLILITER(S): 9 INJECTION, SOLUTION INTRAVENOUS at 05:16

## 2020-07-19 RX ADMIN — Medication 100 GRAM(S): at 14:19

## 2020-07-19 RX ADMIN — PANTOPRAZOLE SODIUM 40 MILLIGRAM(S): 20 TABLET, DELAYED RELEASE ORAL at 05:27

## 2020-07-19 RX ADMIN — MORPHINE SULFATE 2 MILLIGRAM(S): 50 CAPSULE, EXTENDED RELEASE ORAL at 03:48

## 2020-07-19 RX ADMIN — Medication 650 MILLIGRAM(S): at 11:07

## 2020-07-19 NOTE — CHART NOTE - NSCHARTNOTEFT_GEN_A_CORE
Pt seen and examined. Pt talking very rapidly and has refused her meds including IV abx and lab draw. I spent time explaining the risks of not taking her antibiotics and that we can not measure if her leukocytosis is improving without labs. Patient still refuses all meds and labs at this time.

## 2020-07-19 NOTE — CHART NOTE - NSCHARTNOTEFT_GEN_A_CORE
ALEX LILLY [id number]  39y Female    HPI:  · Chief Complaint: The patient is a 39y Female complaining of abdominal pain.	  · HPI Objective Statement: 40 yo F hx of bipolar  do, psh c section, finger  surgery, presents with c/o constant, severe, sharp lower abdominal pains since yesterday. No f/c/n/v/c/d. No hematuria or dysuria. LMP 7/15/20	      Pt seen and evaluated by surgical team upon arrival to [facility].    39F PMH insomnia, bipolar disorder, OA carpal tunnel release, cesarian section presents w/ 1 day of RLQ pain    [x] A ten-point review of systems was otherwise negative except as noted.  [ ] Due to altered mental status/intubation, subjective information were not able to be obtained from the patient. History was obtained, to the extent possible, from review of the chart and collateral sources of information.      Vital Signs Last 24 Hrs  T(C): 37.5 (2020 01:32), Max: 38.6 (2020 19:28)  T(F): 99.3 (2020 01:30), Max: 101.5 (2020 19:28)  HR: 81 (2020 01:32) (74 - 88)  BP: 115/68 (2020 01:32) (98/61 - 123/90)  BP(mean): --  RR: 20 (2020 01:32) (16 - 20)  SpO2: 99% (2020 01:32) (98% - 100%)    PHYSICAL EXAM:  GENERAL: NAD, well-appearing  CHEST/LUNG: Clear to auscultation bilaterally  HEART: Regular rate and rhythm  ABDOMEN: Soft, RLQ tender, no rebound, some voluntary guarding, Nondistended;   EXTREMITIES:  No clubbing, cyanosis, or edema      LABS:  Labs:  CAPILLARY BLOOD GLUCOSE                          15.2   23.52 )-----------( 300      ( 2020 17:48 )             44.2       Auto Neutrophil %: 91.7 % (20 @ 17:48)  Auto Immature Granulocyte %: 0.7 % (20 @ 17:48)  Band Neutrophils %: 2 % (20 @ 17:48)  Auto Neutrophil %: 89.0 % (20 @ 17:48)        137  |  97<L>  |  9<L>  ----------------------------<  144<H>  4.2   |  26  |  0.9      Calcium, Total Serum: 9.9 mg/dL (20 @ 17:48)      LFTs:             7.8  | 0.9  | 14       ------------------[90      ( 2020 17:48 )  4.5  | x    | 15          Lipase:18     Amylase:x         Lactate, Blood: 1.4 mmol/L (20 @ 17:48)      Coags:     11.60  ----< 1.01    ( 2020 17:48 )     27.0         Urinalysis Basic - ( 2020 17:48 )    Color: Brown / Appearance: Turbid / S.025 / pH: x  Gluc: x / Ketone: 160  / Bili: Moderate / Urobili: 1.0 mg/dL   Blood: x / Protein: 100 mg/dL / Nitrite: Positive   Leuk Esterase: Negative / RBC: 11-25 /HPF / WBC 1-2 /HPF   Sq Epi: x / Non Sq Epi: Occasional /HPF / Bacteria: Few        RADIOLOGY & ADDITIONAL STUDIES:    < from: CT Abdomen and Pelvis w/ IV Cont (20 @ 18:50) >    PERITONEUM/MESENTERY/BOWEL: No bowel obstruction, ascites or intraperitoneal free air. The appendix is dilated measuring about 1 cm in diameter with surrounding inflammatory changes consistent with acute appendicitis. No abscess formation is seen..    BONES/SOFT TISSUES: No acute osseous abnormality.    OTHER: Aorta is normal in caliber.    IMPRESSION:    Findings consistent with acute appendicitis    < end of copied text >    ASSESSMENT:  39y Female w/ acute appendicitis    PLAN:  Preopped and in OR for laparoscopic appendectomy  POC  D/w Dr. Scruggs

## 2020-07-19 NOTE — CHART NOTE - NSCHARTNOTEFT_GEN_A_CORE
PACU ANESTHESIA ADMISSION NOTE      Procedure:   Post op diagnosis:      ____  Intubated  TV:______       Rate: ______      FiO2: ______    __x__  Patent Airway    __x__  Full return of protective reflexes    _x___  Full recovery from anesthesia / back to baseline status    Vitals:  T(C): 37.5  HR: 81   BP: 115/68   RR: 20   SpO2: 99%     Mental Status:  __x__ Awake   _____ Alert   _____ Drowsy   _____ Sedated    Nausea/Vomiting:  __x__ NO  ______Yes,   See Post - Op Orders          Pain Scale (0-10):  _____    Treatment: ____ None    __x__ See Post - Op/PCA Orders    Post - Operative Fluids:   ____ Oral   __x__ See Post - Op Orders    Plan: Discharge:   ____Home       ___x__Floor     _____Critical Care    _____  Other:_________________    Comments:

## 2020-07-19 NOTE — PROGRESS NOTE ADULT - ATTENDING COMMENTS
18yo female with PMHx of bipolar disorder and previous  transferred from [facility] with acute purulent appendicitis, WBC 23 s/p laparoscopic appendectomy POD#0. At this time, patient states that she is feeling much better. Her incisional pain is tolerated and controlled. Wounds appear well, abdomen soft. Repeat WBC pending. Start CLD and advance as tolerated. If labs are improving and patient tolerates diet, then discharge home. Encourage ambulation/OOB, incentive spirometer, DVT ppx.

## 2020-07-20 ENCOUNTER — TRANSCRIPTION ENCOUNTER (OUTPATIENT)
Age: 40
End: 2020-07-20

## 2020-07-20 VITALS
SYSTOLIC BLOOD PRESSURE: 116 MMHG | TEMPERATURE: 98 F | DIASTOLIC BLOOD PRESSURE: 78 MMHG | RESPIRATION RATE: 18 BRPM | HEART RATE: 77 BPM

## 2020-07-20 DIAGNOSIS — F31.9 BIPOLAR DISORDER, UNSPECIFIED: ICD-10-CM

## 2020-07-20 LAB
ANION GAP SERPL CALC-SCNC: 14 MMOL/L — SIGNIFICANT CHANGE UP (ref 7–14)
BASOPHILS # BLD AUTO: 0.03 K/UL — SIGNIFICANT CHANGE UP (ref 0–0.2)
BASOPHILS NFR BLD AUTO: 0.2 % — SIGNIFICANT CHANGE UP (ref 0–1)
BUN SERPL-MCNC: 6 MG/DL — LOW (ref 10–20)
CALCIUM SERPL-MCNC: 9.1 MG/DL — SIGNIFICANT CHANGE UP (ref 8.5–10.1)
CHLORIDE SERPL-SCNC: 99 MMOL/L — SIGNIFICANT CHANGE UP (ref 98–110)
CO2 SERPL-SCNC: 24 MMOL/L — SIGNIFICANT CHANGE UP (ref 17–32)
CREAT SERPL-MCNC: 0.8 MG/DL — SIGNIFICANT CHANGE UP (ref 0.7–1.5)
CULTURE RESULTS: SIGNIFICANT CHANGE UP
EOSINOPHIL # BLD AUTO: 0.02 K/UL — SIGNIFICANT CHANGE UP (ref 0–0.7)
EOSINOPHIL NFR BLD AUTO: 0.1 % — SIGNIFICANT CHANGE UP (ref 0–8)
GLUCOSE SERPL-MCNC: 108 MG/DL — HIGH (ref 70–99)
HCT VFR BLD CALC: 36.4 % — LOW (ref 37–47)
HGB BLD-MCNC: 12.4 G/DL — SIGNIFICANT CHANGE UP (ref 12–16)
IMM GRANULOCYTES NFR BLD AUTO: 0.6 % — HIGH (ref 0.1–0.3)
LYMPHOCYTES # BLD AUTO: 1.27 K/UL — SIGNIFICANT CHANGE UP (ref 1.2–3.4)
LYMPHOCYTES # BLD AUTO: 7.4 % — LOW (ref 20.5–51.1)
MAGNESIUM SERPL-MCNC: 1.7 MG/DL — LOW (ref 1.8–2.4)
MCHC RBC-ENTMCNC: 31.4 PG — HIGH (ref 27–31)
MCHC RBC-ENTMCNC: 34.1 G/DL — SIGNIFICANT CHANGE UP (ref 32–37)
MCV RBC AUTO: 92.2 FL — SIGNIFICANT CHANGE UP (ref 81–99)
MONOCYTES # BLD AUTO: 1.21 K/UL — HIGH (ref 0.1–0.6)
MONOCYTES NFR BLD AUTO: 7 % — SIGNIFICANT CHANGE UP (ref 1.7–9.3)
NEUTROPHILS # BLD AUTO: 14.59 K/UL — HIGH (ref 1.4–6.5)
NEUTROPHILS NFR BLD AUTO: 84.7 % — HIGH (ref 42.2–75.2)
NRBC # BLD: 0 /100 WBCS — SIGNIFICANT CHANGE UP (ref 0–0)
PHOSPHATE SERPL-MCNC: 2.1 MG/DL — SIGNIFICANT CHANGE UP (ref 2.1–4.9)
PLATELET # BLD AUTO: 228 K/UL — SIGNIFICANT CHANGE UP (ref 130–400)
POTASSIUM SERPL-MCNC: 3.7 MMOL/L — SIGNIFICANT CHANGE UP (ref 3.5–5)
POTASSIUM SERPL-SCNC: 3.7 MMOL/L — SIGNIFICANT CHANGE UP (ref 3.5–5)
RBC # BLD: 3.95 M/UL — LOW (ref 4.2–5.4)
RBC # FLD: 12.4 % — SIGNIFICANT CHANGE UP (ref 11.5–14.5)
SODIUM SERPL-SCNC: 137 MMOL/L — SIGNIFICANT CHANGE UP (ref 135–146)
SPECIMEN SOURCE: SIGNIFICANT CHANGE UP
WBC # BLD: 17.22 K/UL — HIGH (ref 4.8–10.8)
WBC # FLD AUTO: 17.22 K/UL — HIGH (ref 4.8–10.8)

## 2020-07-20 PROCEDURE — 99024 POSTOP FOLLOW-UP VISIT: CPT

## 2020-07-20 PROCEDURE — 99232 SBSQ HOSP IP/OBS MODERATE 35: CPT | Mod: GC

## 2020-07-20 RX ORDER — ACETAMINOPHEN WITH CODEINE 300MG-30MG
1 TABLET ORAL
Qty: 5 | Refills: 0
Start: 2020-07-20 | End: 2020-07-24

## 2020-07-20 RX ORDER — SODIUM CHLORIDE 9 MG/ML
1000 INJECTION, SOLUTION INTRAVENOUS
Refills: 0 | Status: DISCONTINUED | OUTPATIENT
Start: 2020-07-20 | End: 2020-07-20

## 2020-07-20 RX ORDER — SODIUM CHLORIDE 9 MG/ML
1000 INJECTION INTRAMUSCULAR; INTRAVENOUS; SUBCUTANEOUS ONCE
Refills: 0 | Status: DISCONTINUED | OUTPATIENT
Start: 2020-07-20 | End: 2020-07-20

## 2020-07-20 RX ORDER — POTASSIUM CHLORIDE 20 MEQ
20 PACKET (EA) ORAL ONCE
Refills: 0 | Status: COMPLETED | OUTPATIENT
Start: 2020-07-20 | End: 2020-07-20

## 2020-07-20 RX ORDER — DEXTROAMPHETAMINE SACCHARATE, AMPHETAMINE ASPARTATE, DEXTROAMPHETAMINE SULFATE AND AMPHETAMINE SULFATE 1.875; 1.875; 1.875; 1.875 MG/1; MG/1; MG/1; MG/1
1 TABLET ORAL
Qty: 0 | Refills: 0 | DISCHARGE
Start: 2020-07-20

## 2020-07-20 RX ORDER — ONDANSETRON 8 MG/1
4 TABLET, FILM COATED ORAL ONCE
Refills: 0 | Status: COMPLETED | OUTPATIENT
Start: 2020-07-20 | End: 2020-07-20

## 2020-07-20 RX ORDER — MAGNESIUM SULFATE 500 MG/ML
2 VIAL (ML) INJECTION ONCE
Refills: 0 | Status: COMPLETED | OUTPATIENT
Start: 2020-07-20 | End: 2020-07-20

## 2020-07-20 RX ADMIN — Medication 650 MILLIGRAM(S): at 11:14

## 2020-07-20 RX ADMIN — PANTOPRAZOLE SODIUM 40 MILLIGRAM(S): 20 TABLET, DELAYED RELEASE ORAL at 08:09

## 2020-07-20 RX ADMIN — Medication 200 MILLIGRAM(S): at 05:12

## 2020-07-20 RX ADMIN — OXYCODONE HYDROCHLORIDE 5 MILLIGRAM(S): 5 TABLET ORAL at 19:15

## 2020-07-20 RX ADMIN — ONDANSETRON 4 MILLIGRAM(S): 8 TABLET, FILM COATED ORAL at 12:03

## 2020-07-20 RX ADMIN — OXYCODONE HYDROCHLORIDE 5 MILLIGRAM(S): 5 TABLET ORAL at 19:00

## 2020-07-20 RX ADMIN — Medication 100 MILLIGRAM(S): at 05:12

## 2020-07-20 RX ADMIN — OXYCODONE HYDROCHLORIDE 5 MILLIGRAM(S): 5 TABLET ORAL at 01:53

## 2020-07-20 RX ADMIN — HEPARIN SODIUM 5000 UNIT(S): 5000 INJECTION INTRAVENOUS; SUBCUTANEOUS at 05:12

## 2020-07-20 RX ADMIN — Medication 20 MILLIEQUIVALENT(S): at 04:18

## 2020-07-20 RX ADMIN — OXYCODONE HYDROCHLORIDE 5 MILLIGRAM(S): 5 TABLET ORAL at 11:45

## 2020-07-20 RX ADMIN — Medication 650 MILLIGRAM(S): at 12:00

## 2020-07-20 RX ADMIN — OXYCODONE HYDROCHLORIDE 5 MILLIGRAM(S): 5 TABLET ORAL at 00:50

## 2020-07-20 RX ADMIN — Medication 33.33 GRAM(S): at 04:28

## 2020-07-20 RX ADMIN — OXYCODONE HYDROCHLORIDE 5 MILLIGRAM(S): 5 TABLET ORAL at 11:18

## 2020-07-20 RX ADMIN — Medication 650 MILLIGRAM(S): at 17:14

## 2020-07-20 RX ADMIN — Medication 650 MILLIGRAM(S): at 05:11

## 2020-07-20 RX ADMIN — Medication 650 MILLIGRAM(S): at 05:41

## 2020-07-20 NOTE — BEHAVIORAL HEALTH ASSESSMENT NOTE - NSBHCONSULTFOLLOWAFTERCARE_PSY_A_CORE FT
Patient has a follow up appointment with her psychiatrist on 7/23/20 at [address] [address] Phone: [phone]

## 2020-07-20 NOTE — DISCHARGE NOTE NURSING/CASE MANAGEMENT/SOCIAL WORK - PATIENT PORTAL LINK FT
You can access the FollowMyHealth Patient Portal offered by [facility] by registering at the following website: [email or web address]. By joining [facility]’s FollowMyHealth portal, you will also be able to view your health information using other applications (apps) compatible with our system.

## 2020-07-20 NOTE — DISCHARGE NOTE PROVIDER - INSTRUCTIONS
Patient may resume regular diet as tolerated and is advised to cyndee the clinic or return to the emergency room if any fever, chills, abdominal, nausea, vomiting or any new and acute symptoms arise.

## 2020-07-20 NOTE — DISCHARGE NOTE PROVIDER - CARE PROVIDER_API CALL
KANE VENTURA  [facility]  [address] [place] [address]  Phone: [phone]  Fax: [phone]  Follow Up Time: 1 week

## 2020-07-20 NOTE — BEHAVIORAL HEALTH ASSESSMENT NOTE - NSBHCHARTREVIEWINVESTIGATE_PSY_A_CORE FT
< from: 12 Lead ECG (07.18.20 @ 17:58) >  Ventricular Rate 92 BPM  Atrial Rate 92 BPM  P-R Interval 134 ms  QRS Duration 72 ms  Q-T Interval 344 ms  QTC Calculation(Bezet) 425 ms

## 2020-07-20 NOTE — PROGRESS NOTE ADULT - ASSESSMENT
A/P:  ALEX LILLY is a 39yFemale POD from Laparoscopic appendectomy  . She is currently  AFVSS In NAD & has no complaints. Tolerating liquids, +ambulation, +BM/gas, voiding. will advance diet today     Plan:   - OOB  - IS  - f/u vitals  - f/u labs at 11am-- will d/c if wnl  - DVT PPX  - GI PPX  - Begin dispo planning for D/C  - patient was explained plan, understood, and agreed  - proper discharge instructions will be given to patient with follow up instructions   - will continue abx in hospital until labs come back.  - will advance diet to regular
A/P:  ALEX LILLY is a 39yFemale HD/[id number] Laparoscopic appendectomy      Plan: 	  - OOB  - IS  - f/u vitals  - f/u labs & replete if necessary  - DVT PPX  - GI PPX  - Speak w/ SW/CM  - Begin dispo planning for D/C  - WBC from 24 to 17.2 pt afebrile vss, can possibly dc with PO abx today  - patient was explained plan, understood, and agreed  - hypomagnesemia 1.7 --> given 2gm IV MAg sulfate  - K+ = 3.7  20mEq potasium given PO

## 2020-07-20 NOTE — BEHAVIORAL HEALTH ASSESSMENT NOTE - NSBHCHARTREVIEWIMAGING_PSY_A_CORE FT
< from: CT Abdomen and Pelvis w/ IV Cont (07.18.20 @ 18:50) >    IMPRESSION:    Findings consistent with acute appendicitis

## 2020-07-20 NOTE — DISCHARGE NOTE PROVIDER - NSDCMRMEDTOKEN_GEN_ALL_CORE_FT
Adderall XR 20 mg oral capsule, extended release: 1 cap(s) orally every other day (at bedtime)  Albuterol (Eqv-ProAir HFA) 90 mcg/inh inhalation aerosol: 2 puff(s) inhaled every 6 hours, As Needed  mirtazapine 45 mg oral tablet: 1 tab(s) orally once a day (at bedtime)  SEROquel 100 mg oral tablet: 1 tab(s) orally once a day (at bedtime)  Xulane 150 mcg-35 mcg/24 hr transdermal film, extended release: 1 patch transdermal once a week

## 2020-07-20 NOTE — BEHAVIORAL HEALTH ASSESSMENT NOTE - NSBHMEDSOTHERFT_PSY_A_CORE
Outpatient medications confirmed with pharmacy:  Mirtazapine 45 mg PO nightly  Adderall XR 20 mg PO every other day  Quetiapine 100 mg PO nightly

## 2020-07-20 NOTE — PROGRESS NOTE ADULT - SUBJECTIVE AND OBJECTIVE BOX
GENERAL SURGERY PROGRESS NOTE     ALEX LILLY  [address] [place] [address] day : 1  POD: 0  Procedure: Laparoscopic appendectomy    Surgical Attending: Chanell Scruggs      Overnight events:  pt went to OR early this AM for lap appy. findings of purulent appendicitis noted.     Patient seen & examined at bedside. AFVSS In NAD & has no complaints. Tolerating liquids, +ambulation, +BM/gas, voiding. will advance diet today       T(F): 97.7 (20 @ 08:31), Max: 101.5 (20 @ 19:28 prior to OR)  HR: 89 (20 @ 08:31) (74 - 101)  BP: 137/90 (20 @ 08:31) (98/61 - 167/79)  ABP: --  ABP(mean): --  RR: 18 (20 @ 08:31) (13 - 22)  SpO2: 96% (20 @ 04:30) (95% - 100%)        GI proph:  pantoprazole    Tablet 40 milliGRAM(s) Oral before breakfast    AC/ proph: heparin   Injectable 5000 Unit(s) SubCutaneous every 8 hours    ABx: cefoTEtan  IVPB 2 Gram(s) IV Intermittent every 12 hours      PHYSICAL EXAM:  GENERAL: NAD, well-appearing  CHEST/LUNG: Clear to auscultation bilaterally  HEART: Regular rate and rhythm  ABDOMEN: Soft, Nontender, Nondistended; 3 small laparoscopic port incisions clean dry and intact with dermabond  EXTREMITIES:  No clubbing, cyanosis, or edema      LABS  Labs:                            15.2   23.52 )-----------( 300      ( 2020 17:48 )             44.2       Auto Neutrophil %: 91.7 % (20 @ 17:48)  Auto Immature Granulocyte %: 0.7 % (20 17:48)  Band Neutrophils %: 2 % (20 @ 17:48)  Auto Neutrophil %: 89.0 % (20 @ 17:48)        137  |  97<L>  |  9<L>  ----------------------------<  144<H>  4.2   |  26  |  0.9      Calcium, Total Serum: 9.9 mg/dL (20 @ 17:48)      LFTs:             7.8  | 0.9  | 14       ------------------[90      ( 2020 17:48 )  4.5  | x    | 15          Lipase:18     Amylase:x         Lactate, Blood: 1.4 mmol/L (20 @ 17:48)      Coags:     11.60  ----< 1.01    ( 2020 17:48 )     27.0                Urinalysis Basic - ( 2020 17:48 )    Color: Brown / Appearance: Turbid / S.025 / pH: x  Gluc: x / Ketone: 160  / Bili: Moderate / Urobili: 1.0 mg/dL   Blood: x / Protein: 100 mg/dL / Nitrite: Positive   Leuk Esterase: Negative / RBC: 11-25 /HPF / WBC 1-2 /HPF   Sq Epi: x / Non Sq Epi: Occasional /HPF / Bacteria: Few
GENERAL SURGERY PROGRESS NOTE     ALEX LILLY  [address] [place] [address] day :1d  Procedure: Laparoscopic appendectomy    Surgical Attending: Chanell Scruggs      Overnight events:  Patient seen & examined at bedside.  Tolerating liquid diet, +ambulation, +gas, voiding. SOme confusion of patients home psych meds she was not taking and patient had refused meds, refused labs and refused ABX. After speaking with the patient many times she was agreeable to labs and her white count is now down trending 17.2 from 24.         T(F): 98.5 (20 @ 00:31), Max: 99.2 (20 @ 04:30)  HR: 104 (20 @ 00:31) (77 - 104)  BP: 128/72 (20 @ 00:31) (116/71 - 167/79)  ABP: --  ABP(mean): --  RR: 18 (20 @ 00:31) (13 - 22)  SpO2: 96% (20 @ 04:30) (95% - 98%)      20 @ 07:01  -  20 @ 02:09  --------------------------------------------------------  IN:    IV PiggyBack: 150 mL    Oral Fluid: 240 mL  Total IN: 390 mL    OUT:    Voided: 200 mL  Total OUT: 200 mL    Total NET: 190 mL        DIET/FLUIDS: magnesium sulfate  IVPB 2 Gram(s) IV Intermittent once  potassium chloride    Tablet ER 20 milliEquivalent(s) Oral once         GI proph:  pantoprazole    Tablet 40 milliGRAM(s) Oral before breakfast    AC/ proph: heparin   Injectable 5000 Unit(s) SubCutaneous every 8 hours    ABx: ciprofloxacin   IVPB 400 milliGRAM(s) IV Intermittent every 12 hours  ciprofloxacin   IVPB      metroNIDAZOLE  IVPB      metroNIDAZOLE  IVPB 500 milliGRAM(s) IV Intermittent every 8 hours      PHYSICAL EXAM:  GENERAL: NAD, well-appearing  CHEST/LUNG: Clear to auscultation bilaterally  HEART: Regular rate and rhythm  ABDOMEN: Soft, Nontender, Nondistended; 3 small laparoscopic incisions clean dry and intact  EXTREMITIES:  No clubbing, cyanosis, or edema      LABS                        12.4   17.22 )-----------( 228      ( 2020 01:14 )             36.4       Auto Neutrophil %: 84.7 % (20 @ 01:14)  Auto Immature Granulocyte %: 0.6 % (20 @ 01:14)  Auto Neutrophil %: 92.9 % (20 @ 12:41)        137  |  99  |  6<L>  ----------------------------<  108<H>  3.7   |  24  |  0.8      Calcium, Total Serum: 9.1 mg/dL (20 @ 01:14)      LFTs:             7.8  | 0.9  | 14       ------------------[90      ( 2020 17:48 )  4.5  | x    | 15          Lipase:18     Amylase:x         Lactate, Blood: 1.4 mmol/L (20 @ 17:48)      Coags:     11.60  ----< 1.01    ( 2020 17:48 )     27.0                Urinalysis Basic - ( 2020 17:48 )    Color: Brown / Appearance: Turbid / S.025 / pH: x  Gluc: x / Ketone: 160  / Bili: Moderate / Urobili: 1.0 mg/dL   Blood: x / Protein: 100 mg/dL / Nitrite: Positive   Leuk Esterase: Negative / RBC: 11-25 /HPF / WBC 1-2 /HPF   Sq Epi: x / Non Sq Epi: Occasional /HPF / Bacteria: Few

## 2020-07-20 NOTE — DISCHARGE NOTE PROVIDER - NSDCFUADDINST_GEN_ALL_CORE_FT
No lifting >10lbs for two weeks. Avoid straining or excessive activity x 6 weeks post operatively.  DRESSINGS: Do not scrub wounds. You may shower, do not bathe.

## 2020-07-20 NOTE — BEHAVIORAL HEALTH ASSESSMENT NOTE - NSBHCHARTREVIEWLAB_PSY_A_CORE FT
07-20    137  |  99  |  6<L>  ----------------------------<  108<H>  3.7   |  24  |  0.8    Ca    9.1      20 Jul 2020 01:14  Phos  2.1     07-20  Mg     1.7     07-20    TPro  7.8  /  Alb  4.5  /  TBili  0.9  /  DBili  x   /  AST  14  /  ALT  15  /  AlkPhos  90  07-18                          12.4   17.22 )-----------( 228      ( 20 Jul 2020 01:14 )             36.4

## 2020-07-20 NOTE — PROGRESS NOTE ADULT - ATTENDING COMMENTS
18yo female with PMHx of bipolar disorder and previous  transferred from [facility] with acute purulent appendicitis, WBC 23 s/p laparoscopic appendectomy POD#1. At this time, patient states that she is feeling much better. Her incisional pain is tolerated and controlled. Wounds appear well, abdomen soft. WBC 17 from 23. Tolerating CLD and advance as tolerated. Was tachycardic over night to 115, will continue antibiotics and give IV fluids. If tolerates diet and HR normalizes, then she can be discharged today. Otherwise, will reassess tomorrow. Encourage ambulation/OOB, incentive spirometer, DVT ppx.

## 2020-07-20 NOTE — BEHAVIORAL HEALTH ASSESSMENT NOTE - HPI (INCLUDE ILLNESS QUALITY, SEVERITY, DURATION, TIMING, CONTEXT, MODIFYING FACTORS, ASSOCIATED SIGNS AND SYMPTOMS)
Collateral information obtained from patient's mother Ms. Shoemaker, [phone] who noted that patient has been doing ok at home until the recent physical discomfort leading to the appendectomy. There has been no abnormal behavior at home. And per mother patient is doing much better comparing to the recent episode that led to the inpatient psych admission in 2018. She noted that patient is compliant with her outpatient psych treatment. Mother stated patient takes her medications, but not fully according to the way they are prescribed.  Per mother, patient has baseline rapid speech as an [place]oth[place] descent. Mother expressed no concern about patient's safety. No history of suicide ideation or attempt since her last inpatient psych discharge from [facility] in 2018. The patient, a 39-year-old female, domiciled with her parents, unemployed, mother of a 20-year-old daughter, psychiatric history of bipolar disorder, most recent hospitalization in 2019, s/p appendectomy this admission. Psychiatry was consulted for mental health evaluation for clearance prior to discharge.     The patient was seen sitting up in bed, in no acute distress, with a reported mood of "calm," with hyper-verbose, rapid speech. She states that while she felt "keyed up" after surgery, she would like to go home and rest. She states that she has been in outpatient treatment at the [facility] since her last inpatient admission in 2018, and that she has felt her mood has been stable recently. She denies change in sleep, elevated mood, appetite or weight, interest. The patient also flatly denies suicidal ideation, homicidal ideation, visual disturbances or hallucinations, or auditory hallucinations.     Collateral information obtained from patient's mother Ms. Shoemaker, [phone] who noted that patient has been doing ok at home until the recent physical discomfort leading to the appendectomy. There has been no abnormal behavior at home. And per mother patient is doing much better comparing to the recent episode that led to the inpatient psych admission in 2019. She noted that patient is compliant with her outpatient psych treatment. Mother stated patient takes her medications, but not fully according to the way they are prescribed.  Per mother, patient has baseline rapid speech as an [place] descent. Mother expressed no concern about patient's safety. No history of suicide ideation or attempt since her last inpatient psych discharge from [facility] in 2019.

## 2020-07-20 NOTE — DISCHARGE NOTE PROVIDER - NSDCCPCAREPLAN_GEN_ALL_CORE_FT
PRINCIPAL DISCHARGE DIAGNOSIS  Diagnosis: Acute appendicitis  Assessment and Plan of Treatment: ACTIVITY: No lifting >10lbs for two weeks. Avoid straining or excessive activity x 6 weeks post operatively.  DRESSINGS: Do not scrub wounds. You may shower, do not bathe.

## 2020-07-20 NOTE — BEHAVIORAL HEALTH ASSESSMENT NOTE - CASE SUMMARY
40 year old female mother of a 19 yo daughter, patient domiciled with her parents, prior psychiatric history of bipolar disorder, most recent hospitalization in 2018, [place] [address] Phone: [phone], last seen on 9 of July, next appointment on 23 July of 2019, patient is s/p appendectomy this admission. Psychiatry consult for Mental health evaluation for clearance prior to discharge. On evaluation, notable fast speech, but goal directed with her statement, no active psychosis is noted, no suicidal ideation. Baseline hyperverbosity in the context of stress related to the surgery but no active aakash. At this time there is no indication for psychiatric admission, patient is cleared.   She will continue follow up with her outpatient psychiatrist and therapist on 7/23/20 [place] [address] Phone: [phone]. 39 year old female mother of a 19 yo daughter, patient domiciled with her parents, prior psychiatric history of bipolar disorder, most recent hospitalization in 2019, [place] [address] Phone: [phone], last seen on 9 of July, next appointment on 23 July of 2020, patient is s/p appendectomy this admission. Psychiatry consult for Mental health evaluation for clearance prior to discharge. On evaluation, notable fast speech, but goal directed with her statement, no active psychosis is noted, no suicidal ideation. Baseline hyperverbosity in the context of stress related to the surgery but no active aakash. At this time there is no indication for psychiatric admission, patient is cleared.   She will continue follow up with her outpatient psychiatrist and therapist on 7/23/20 [place] [address] Phone: [phone].

## 2020-07-20 NOTE — BEHAVIORAL HEALTH ASSESSMENT NOTE - RISK ASSESSMENT
Low Acute Suicide Risk Risk factors include history of non-adherence with medication regimen, impulsivity, and psychiatric hospitalization. Protective factors include no history of suicidality, strong family support, engagement with care, strong therapeutic alliance.

## 2020-07-20 NOTE — BEHAVIORAL HEALTH ASSESSMENT NOTE - NSBHCHARTREVIEWVS_PSY_A_CORE FT
ICU Vital Signs Last 24 Hrs  T(C): 36.4 (20 Jul 2020 13:30), Max: 37.7 (20 Jul 2020 04:28)  T(F): 97.5 (20 Jul 2020 13:30), Max: 99.9 (20 Jul 2020 04:28)  HR: 91 (20 Jul 2020 13:30) (91 - 115)  BP: 110/62 (20 Jul 2020 13:30) (110/62 - 147/80)  BP(mean): --  ABP: --  ABP(mean): --  RR: 20 (20 Jul 2020 13:30) (17 - 20)  SpO2: --

## 2020-07-20 NOTE — DISCHARGE NOTE PROVIDER - HOSPITAL COURSE
Patient is a 20yo female with PMHx of bipolar disorder and previous  transferred from [facility] with acute purulent appendicitis, WBC was 23 on arrival. The patient was treated surgically and is currently s/p laparoscopic appendectomy. At this time, patient states that she is feeling much better and is tolerating her diet very well. Her incisional pain is also well tolerated and controlled without any complaints at this time. Wounds appear well, clean, dry and intact and the abdomen is soft and nondistended. WBC has trended down to 17 from 23 since arrival. Patient has used the incentive spirometer and has stable vitals signs at this time.

## 2020-07-20 NOTE — BEHAVIORAL HEALTH ASSESSMENT NOTE - MUSCLE TONE / STRENGTH
Reason For Visit  Reason For Visit Free Text Note Form: SW FOLLOWUP      Active Problems    1  Abnormal laboratory test result (796 4) (R89 9)   2  Dental decay (521 00) (K02 9)   3  Skin lesions (709 9) (L98 9)    Current Meds   1  No Reported Medications Recorded    Allergies    1  No Known Drug Allergies    2  No Known Environmental Allergies    Discussion/Summary  Discussion Summary:   SW HAD PREVIOUSLY DISCUSSED PT WITH PROVIDER (BRIAN) PRIOR TO POST-TEST COUNSELING  SW WAS NOW ADVISED THAT PT DID NOT RETURN TO [facility] AFTER RECEIVING TEST RESULTS AND STAFF HAS BEEN UNABLE TO CONTACT HIM  STAFF WAS TOLD THAT HE HAS MOVED BACK TO [place]  ALL NECESSARY REPORTS HAVE BEEN MADE BY PROVIDER  PT ALSO HAS A FOUR MONTH OLD BABY AND SW GAVE INFORMATION ABOUT CHILD TO PROVIDER FOR FOLLOWUP  PER REQUEST OF PROVIDER, A CERTIFIED LETTER WAS SENT TO PT TODAY TO [facility]  COPY OF LETTER WAS SUBMITTED FOR SCANNING TO PT CHART        Signatures   Electronically signed by : LUIS A Guardado; Oct 31 2017 10:45AM EST                       (Author)
Normal muscle tone/strength

## 2020-07-20 NOTE — BEHAVIORAL HEALTH ASSESSMENT NOTE - DESCRIPTION (FIRST USE, LAST USE, QUANTITY, FREQUENCY, DURATION)
Currently vapes Last use glass of champagne 1 week ago; denies frequent use; denies history of abuse

## 2020-07-23 DIAGNOSIS — F17.290 NICOTINE DEPENDENCE, OTHER TOBACCO PRODUCT, UNCOMPLICATED: ICD-10-CM

## 2020-07-23 DIAGNOSIS — E83.42 HYPOMAGNESEMIA: ICD-10-CM

## 2020-07-23 DIAGNOSIS — M19.90 UNSPECIFIED OSTEOARTHRITIS, UNSPECIFIED SITE: ICD-10-CM

## 2020-07-23 DIAGNOSIS — F31.9 BIPOLAR DISORDER, UNSPECIFIED: ICD-10-CM

## 2020-07-23 DIAGNOSIS — K35.30 ACUTE APPENDICITIS WITH LOCALIZED PERITONITIS, WITHOUT PERFORATION OR GANGRENE: ICD-10-CM

## 2020-07-23 LAB — SURGICAL PATHOLOGY STUDY: SIGNIFICANT CHANGE UP

## 2020-07-24 LAB
CULTURE RESULTS: SIGNIFICANT CHANGE UP
CULTURE RESULTS: SIGNIFICANT CHANGE UP
SPECIMEN SOURCE: SIGNIFICANT CHANGE UP
SPECIMEN SOURCE: SIGNIFICANT CHANGE UP

## 2020-08-07 ENCOUNTER — APPOINTMENT (OUTPATIENT)
Dept: SURGERY | Facility: CLINIC | Age: 40
End: 2020-08-07
Payer: MEDICAID

## 2020-08-07 VITALS
DIASTOLIC BLOOD PRESSURE: 60 MMHG | WEIGHT: 173 LBS | HEIGHT: 63 IN | HEART RATE: 70 BPM | TEMPERATURE: 97.8 F | SYSTOLIC BLOOD PRESSURE: 118 MMHG | BODY MASS INDEX: 30.65 KG/M2

## 2020-08-07 DIAGNOSIS — K35.31 ACUTE APPENDICITIS W/ LOCALIZED PERITONITIS AND GANGRENE,W/O PERFORATION: ICD-10-CM

## 2020-08-07 PROCEDURE — 99024 POSTOP FOLLOW-UP VISIT: CPT

## 2020-08-10 PROBLEM — K35.31 ACUTE APPENDICITIS WITH LOCALIZED PERITONITIS AND GANGRENE, WITHOUT PERFORATION OR ABSCESS: Status: RESOLVED | Noted: 2020-08-10 | Resolved: 2020-08-10

## 2020-08-10 NOTE — PHYSICAL EXAM
[Normal Breath Sounds] : Normal breath sounds [Normal Heart Sounds] : normal heart sounds [Alert] : alert [Calm] : calm [de-identified] : no acute distress [de-identified] : soft, non-tender, no rebound/guarding, no masses/hernias, wounds without erythema, drainage, or induration [de-identified] : full ROM x 4 [de-identified] : h [de-identified] : hyperverbal

## 2020-08-10 NOTE — HISTORY OF PRESENT ILLNESS
[de-identified] : 40yo female with PMHx of bipolar disorder, previous  recently admitted for appendicitis s/p laparoscopic appendectomy 2020. Intra-operatively, the appendix was acute inflamed with purulent fluid. She was discharged home with PO antibiotics. At this time, patient reports feeling well. She is  tolerating diet, having bowel movements. Denies fever/chills, nausea/vomiting, chest pain or shortness of breath.

## 2020-08-10 NOTE — ASSESSMENT
[FreeTextEntry1] : 38yo female s/p laparoscopic appendectomy 7/19/2020 for purulent appendicitis. Doing well.\par -no heavy lifting x 6 weeks\par -pathology given for records\par -return to office PRN - particularly fevers, worsening abdominal pain, drainage from wound\par

## 2020-09-02 NOTE — ED ADULT TRIAGE NOTE - ESI TRIAGE ACUITY LEVEL, MLM
Called and left a voicemail message to schedule a fasting lab only appt.  Maria Alejandra Tam MA  [facility]  2nd Floor  Primary Care     3

## 2021-02-13 ENCOUNTER — EMERGENCY (EMERGENCY)
Facility: HOSPITAL | Age: 41
LOS: 0 days | Discharge: HOME | End: 2021-02-14
Attending: STUDENT IN AN ORGANIZED HEALTH CARE EDUCATION/TRAINING PROGRAM | Admitting: STUDENT IN AN ORGANIZED HEALTH CARE EDUCATION/TRAINING PROGRAM
Payer: MEDICAID

## 2021-02-13 VITALS
OXYGEN SATURATION: 93 % | DIASTOLIC BLOOD PRESSURE: 105 MMHG | WEIGHT: 169.98 LBS | HEIGHT: 63 IN | SYSTOLIC BLOOD PRESSURE: 189 MMHG | RESPIRATION RATE: 18 BRPM | TEMPERATURE: 98 F | HEART RATE: 126 BPM

## 2021-02-13 DIAGNOSIS — Z98.891 HISTORY OF UTERINE SCAR FROM PREVIOUS SURGERY: Chronic | ICD-10-CM

## 2021-02-13 DIAGNOSIS — Z98.890 OTHER SPECIFIED POSTPROCEDURAL STATES: Chronic | ICD-10-CM

## 2021-02-13 DIAGNOSIS — Z33.2 ENCOUNTER FOR ELECTIVE TERMINATION OF PREGNANCY: Chronic | ICD-10-CM

## 2021-02-13 DIAGNOSIS — F10.129 ALCOHOL ABUSE WITH INTOXICATION, UNSPECIFIED: ICD-10-CM

## 2021-02-13 DIAGNOSIS — Z87.59 PERSONAL HISTORY OF OTHER COMPLICATIONS OF PREGNANCY, CHILDBIRTH AND THE PUERPERIUM: ICD-10-CM

## 2021-02-13 DIAGNOSIS — Z88.6 ALLERGY STATUS TO ANALGESIC AGENT: ICD-10-CM

## 2021-02-13 DIAGNOSIS — Z88.8 ALLERGY STATUS TO OTHER DRUGS, MEDICAMENTS AND BIOLOGICAL SUBSTANCES: ICD-10-CM

## 2021-02-13 DIAGNOSIS — R41.82 ALTERED MENTAL STATUS, UNSPECIFIED: ICD-10-CM

## 2021-02-13 DIAGNOSIS — F17.200 NICOTINE DEPENDENCE, UNSPECIFIED, UNCOMPLICATED: ICD-10-CM

## 2021-02-13 LAB
ALBUMIN SERPL ELPH-MCNC: 4.5 G/DL — SIGNIFICANT CHANGE UP (ref 3.5–5.2)
ALP SERPL-CCNC: 93 U/L — SIGNIFICANT CHANGE UP (ref 30–115)
ALT FLD-CCNC: 11 U/L — SIGNIFICANT CHANGE UP (ref 0–41)
ANION GAP SERPL CALC-SCNC: 14 MMOL/L — SIGNIFICANT CHANGE UP (ref 7–14)
APAP SERPL-MCNC: <5 UG/ML — LOW (ref 10–30)
AST SERPL-CCNC: 23 U/L — SIGNIFICANT CHANGE UP (ref 0–41)
BASOPHILS # BLD AUTO: 0.04 K/UL — SIGNIFICANT CHANGE UP (ref 0–0.2)
BASOPHILS NFR BLD AUTO: 0.5 % — SIGNIFICANT CHANGE UP (ref 0–1)
BILIRUB DIRECT SERPL-MCNC: <0.2 MG/DL — SIGNIFICANT CHANGE UP (ref 0–0.2)
BILIRUB INDIRECT FLD-MCNC: 0 MG/DL — SIGNIFICANT CHANGE UP (ref 0.2–1.2)
BILIRUB SERPL-MCNC: <0.2 MG/DL — SIGNIFICANT CHANGE UP (ref 0.2–1.2)
BUN SERPL-MCNC: 17 MG/DL — SIGNIFICANT CHANGE UP (ref 10–20)
CALCIUM SERPL-MCNC: 9.6 MG/DL — SIGNIFICANT CHANGE UP (ref 8.5–10.1)
CHLORIDE SERPL-SCNC: 107 MMOL/L — SIGNIFICANT CHANGE UP (ref 98–110)
CK SERPL-CCNC: 131 U/L — SIGNIFICANT CHANGE UP (ref 0–225)
CO2 SERPL-SCNC: 21 MMOL/L — SIGNIFICANT CHANGE UP (ref 17–32)
CREAT SERPL-MCNC: 0.9 MG/DL — SIGNIFICANT CHANGE UP (ref 0.7–1.5)
EOSINOPHIL # BLD AUTO: 0.04 K/UL — SIGNIFICANT CHANGE UP (ref 0–0.7)
EOSINOPHIL NFR BLD AUTO: 0.5 % — SIGNIFICANT CHANGE UP (ref 0–8)
ETHANOL SERPL-MCNC: 116 MG/DL — HIGH
GLUCOSE SERPL-MCNC: 101 MG/DL — HIGH (ref 70–99)
HCG SERPL QL: NEGATIVE — SIGNIFICANT CHANGE UP
HCT VFR BLD CALC: 41.6 % — SIGNIFICANT CHANGE UP (ref 37–47)
HGB BLD-MCNC: 14.3 G/DL — SIGNIFICANT CHANGE UP (ref 12–16)
IMM GRANULOCYTES NFR BLD AUTO: 0.5 % — HIGH (ref 0.1–0.3)
LYMPHOCYTES # BLD AUTO: 1.71 K/UL — SIGNIFICANT CHANGE UP (ref 1.2–3.4)
LYMPHOCYTES # BLD AUTO: 22.1 % — SIGNIFICANT CHANGE UP (ref 20.5–51.1)
MCHC RBC-ENTMCNC: 31.5 PG — HIGH (ref 27–31)
MCHC RBC-ENTMCNC: 34.4 G/DL — SIGNIFICANT CHANGE UP (ref 32–37)
MCV RBC AUTO: 91.6 FL — SIGNIFICANT CHANGE UP (ref 81–99)
MONOCYTES # BLD AUTO: 0.56 K/UL — SIGNIFICANT CHANGE UP (ref 0.1–0.6)
MONOCYTES NFR BLD AUTO: 7.2 % — SIGNIFICANT CHANGE UP (ref 1.7–9.3)
NEUTROPHILS # BLD AUTO: 5.35 K/UL — SIGNIFICANT CHANGE UP (ref 1.4–6.5)
NEUTROPHILS NFR BLD AUTO: 69.2 % — SIGNIFICANT CHANGE UP (ref 42.2–75.2)
NRBC # BLD: 0 /100 WBCS — SIGNIFICANT CHANGE UP (ref 0–0)
PLATELET # BLD AUTO: 228 K/UL — SIGNIFICANT CHANGE UP (ref 130–400)
POTASSIUM SERPL-MCNC: 4.3 MMOL/L — SIGNIFICANT CHANGE UP (ref 3.5–5)
POTASSIUM SERPL-SCNC: 4.3 MMOL/L — SIGNIFICANT CHANGE UP (ref 3.5–5)
PROT SERPL-MCNC: 7.3 G/DL — SIGNIFICANT CHANGE UP (ref 6–8)
RBC # BLD: 4.54 M/UL — SIGNIFICANT CHANGE UP (ref 4.2–5.4)
RBC # FLD: 12.4 % — SIGNIFICANT CHANGE UP (ref 11.5–14.5)
SALICYLATES SERPL-MCNC: <0.3 MG/DL — LOW (ref 4–30)
SODIUM SERPL-SCNC: 142 MMOL/L — SIGNIFICANT CHANGE UP (ref 135–146)
WBC # BLD: 7.74 K/UL — SIGNIFICANT CHANGE UP (ref 4.8–10.8)
WBC # FLD AUTO: 7.74 K/UL — SIGNIFICANT CHANGE UP (ref 4.8–10.8)

## 2021-02-13 PROCEDURE — 70450 CT HEAD/BRAIN W/O DYE: CPT | Mod: 26

## 2021-02-13 PROCEDURE — 99285 EMERGENCY DEPT VISIT HI MDM: CPT

## 2021-02-13 RX ORDER — HALOPERIDOL DECANOATE 100 MG/ML
5 INJECTION INTRAMUSCULAR ONCE
Refills: 0 | Status: COMPLETED | OUTPATIENT
Start: 2021-02-13 | End: 2021-02-13

## 2021-02-13 RX ORDER — SODIUM CHLORIDE 9 MG/ML
1000 INJECTION INTRAMUSCULAR; INTRAVENOUS; SUBCUTANEOUS ONCE
Refills: 0 | Status: COMPLETED | OUTPATIENT
Start: 2021-02-13 | End: 2021-02-13

## 2021-02-13 RX ORDER — MIDAZOLAM HYDROCHLORIDE 1 MG/ML
5 INJECTION, SOLUTION INTRAMUSCULAR; INTRAVENOUS ONCE
Refills: 0 | Status: DISCONTINUED | OUTPATIENT
Start: 2021-02-13 | End: 2021-02-13

## 2021-02-13 RX ADMIN — Medication 2 MILLIGRAM(S): at 21:15

## 2021-02-13 RX ADMIN — HALOPERIDOL DECANOATE 5 MILLIGRAM(S): 100 INJECTION INTRAMUSCULAR at 20:09

## 2021-02-13 RX ADMIN — MIDAZOLAM HYDROCHLORIDE 5 MILLIGRAM(S): 1 INJECTION, SOLUTION INTRAMUSCULAR; INTRAVENOUS at 20:10

## 2021-02-13 RX ADMIN — SODIUM CHLORIDE 1000 MILLILITER(S): 9 INJECTION INTRAMUSCULAR; INTRAVENOUS; SUBCUTANEOUS at 21:00

## 2021-02-13 NOTE — ED PROVIDER NOTE - NSFOLLOWUPINSTRUCTIONS_ED_ALL_ED_FT
Alcohol Intoxication  Alcohol intoxication occurs when a person no longer thinks clearly or functions well (becomes impaired) after drinking alcohol. Intoxication can occur with even one drink. The level of impairment depends on:    The amount of alcohol the person had.  The person's age, gender, and weight.  How often the person drinks.  Whether the person has other medical conditions, such as diabetes, seizures, or a heart condition.    Alcohol intoxication can range in severity from mild to severe. The condition can be dangerous, especially when caused by drinking large amounts of alcohol in a short period of time (binge drinking) or if the person also took certain prescription medicines or recreational drugs.    What are the signs or symptoms?  Symptoms of mild alcohol intoxication include:    Feeling relaxed or sleepy.  Mild difficulty with:    Coordination.  Speech.  Memory.  Attention.      Symptoms of moderate alcohol intoxication include:    Extreme emotions, such as anger or sadness.  Moderate difficulty with:    Coordination.  Speech.  Memory.  Attention.      Symptoms of severe alcohol intoxication include:    Passing out.  Vomiting.  Confusion.  Slow breathing.  Coma.  Severe difficulty with:    Coordination.  Speech.  Memory.  Attention.      Intoxication, especially in people who are not exposed to alcohol often can progress from mild to severe quickly, and may even cause coma or death.    How is this diagnosed?  This condition may be diagnosed based on:    A medical history.  A physical exam.  A blood test that measures the concentration of alcohol in the blood (blood alcohol content, or NIDA).  Whether there is a smell of alcohol on the breath.    Your health care provider will ask you how much alcohol you drank and what kind of alcohol you had.    How is this treated?  Usually, treatment is not needed for this condition. Most of the effects of alcohol are temporary and go away as the alcohol naturally leaves the body. Your health care provider may recommend monitoring until the alcohol level starts to drop and it is safe to go home. You may also get fluids through an IV tube to help prevent dehydration. If the intoxication is severe, a breathing machine called a ventilator may be needed to support your breathing.    Follow these instructions at home:  Do not drive after drinking alcohol.  Have someone stay with you while you are intoxicated. You should not be left alone.  Stay hydrated. Drink enough fluid to keep your urine clear or pale yellow.  Avoid caffeine because it can dehydrate you.  ImageTake over-the-counter and prescription medicines only as told by your health care provider.  How is this prevented?  To prevent alcohol intoxication:    Limit alcohol intake to no more than 1 drink a day for nonpregnant women and 2 drinks a day for men. One drink equals 12 oz of beer, 5 oz of wine, or 1½ oz of hard liquor.  Do not drink alcohol on an empty stomach.  Avoid drinking alcohol if:    You are under the legal drinking age.  You are pregnant or may be pregnant.  You are taking medicines that should not be taken with alcohol.  Your drinking causes your medical condition to get worse.  You need to drive or perform activities that require your attention.  You have substance use disorder.      To prevent potentially serious complications of alcohol intoxication, seek immediate medical care if you or someone you know has signs of moderate or severe alcohol intoxication. These include:    Moderate or severe difficulty with:    Coordination.  Speech.  Memory.  Attention.    Passing out.  Confusion.  Vomiting.    Do not leave someone alone if he or she is intoxicated.    Contact a health care provider if:  You do not feel better after a few days.  You are having problems at work, at school, or at home due to drinking.  Get help right away if:  You become shaky when you try to stop drinking.  You shake uncontrollably (have a seizure).  You vomit blood. Blood in vomit may look bright red, or it may look like coffee grounds.  You have blood in your stool. Blood in stool may be bright red, or it may make stool appear black and tarry and make it smell bad.  You become light-headed or you faint.  If you ever feel like you may hurt yourself or others, or have thoughts about taking your own life, get help right away. You can go to your nearest emergency department or call:     Your local emergency services (911 in the [place]   A suicide crisis helpline, such as the National Suicide Prevention Lifeline at 1-332.388.4883. This is open 24 hours a day.     This information is not intended to replace advice given to you by your health care provider. Make sure you discuss any questions you have with your health care provider.

## 2021-02-13 NOTE — ED PROVIDER NOTE - PHYSICAL EXAMINATION
Physical Exam    Vital Signs: I have reviewed the initial vital signs.  Constitutional: well-nourished, appears stated age, no acute distress  Eyes: Conjunctiva pink, Sclera clear, PERRLA, EOMI.  Cardiovascular: S1 and S2, Tachy regular rate, regular rhythm, well-perfused extremities, radial pulses equal and 2+  Respiratory: unlabored respiratory effort, clear to auscultation bilaterally no wheezing, rales and rhonchi  Gastrointestinal: soft, non-tender abdomen, no pulsatile mass, normal bowl sounds  Musculoskeletal: supple neck, no lower extremity edema, no midline tenderness  Integumentary: warm, dry, no rash  Neurologic: awake, alert, cranial nerves II-XII grossly intact, extremities’ motor and sensory functions grossly intact

## 2021-02-13 NOTE — ED PROVIDER NOTE - CLINICAL SUMMARY MEDICAL DECISION MAKING FREE TEXT BOX
40F with PMH bipolar disorder, insomnia, who presents to [facility] via EMS for unknown ingestion, suspected to be PCP. She was found wandering around outside. No witnessed fall or evidence thereof. Patient states she "just wanted to be around people" and that she "feels happy." She denies illicit drug ingestions, EtOH, or other ingestions. Denies SI/HI. Has no complaints. Pt tachycardic to 126 on arrival. EKG sinus tachycardia, no ischemic changes. pt PERC negative. On 2 occasions pt became agitated, yelling, threating to harm staff, not amenable to verbal de-escalation and multiple redirecting attempts by staff, and required sedation. 1:1 monitoring and pt on continuous tele obs.  labs and imaging reviewed. IVF given. HCT wnl. Pt observed until clinical sobriety, A&O x4, ambulatory with a steady gait. Denies SI/HI. I have fully discussed the medical management and delivery of care with the patient. I have discussed any available labs, imaging and treatment options with the patient. All Questions answered at the bedside. Patient confirms understanding and has been given detailed return precautions. Patient instructed to return to the ED should symptoms persist or worsen. Patient has demonstrated capacity and has verbalized understanding. Patient is well appearing upon discharge, ambulatory with a steady gait.

## 2021-02-13 NOTE — ED PROVIDER NOTE - OBJECTIVE STATEMENT
40 year old female Bipolar disorder comes to emergency room for "I just need to be here." patient explains that she drank a few bottles of champagin today and took PCP and she was wondering around and the EMS brought her here. patient denies falling and hitting head. denies SI/HI.

## 2021-02-13 NOTE — ED PROVIDER NOTE - ATTENDING CONTRIBUTION TO CARE
40F with PMH bipolar disorder, insomnia, who presents to [facility] via EMS for unknown ingestion, suspected to be PCP. She was found wandering around outside. No witnessed fall or evidence thereof. Patient states she "just wanted to be around people" and that she "feels happy." She denies illicit drug ingestions, EtOH, or other ingestions. Denies SI/HI. Has no complaints. Pt tachycardic to 126 on arrival.     Gen - NAD, Head - NCAT, Eyes- rotatory nystagmus, PERRLA, Pharynx - clear, MMM, Heart - tachycardic, regular rhythm, no m/g/r, Lungs - CTAB, no w/c/r, Abdomen - soft, NT, ND, Skin - No rash, Extremities - FROM, no edema, erythema, ecchymosis, Neuro - CN 2-12 intact, nl strength and sensation, nl gait.    a/p: will obtain tox eval, ekg, labs incl tox screen, cpk, ivf, and will reassess.

## 2021-02-13 NOTE — ED PROVIDER NOTE - NSFOLLOWUPCLINICS_GEN_ALL_ED_FT
[facility]  Detox Mgmt  [address] [place] [address]  Phone: [phone]  Fax:   Follow Up Time:

## 2021-02-13 NOTE — ED PROVIDER NOTE - PATIENT PORTAL LINK FT
You can access the FollowMyHealth Patient Portal offered by [facility] by registering at the following website: [email or web address]. By joining [email or web address]’s FollowMyHealth portal, you will also be able to view your health information using other applications (apps) compatible with our system.

## 2021-02-13 NOTE — ED PROVIDER NOTE - PROGRESS NOTE DETAILS
Multiple attempts by, Dr. Reyes, BHARGAV Hirsch, NEWTON Bang, and PCA Shanta - patient shouting and cursing in the ER, clearly intoxicated and confused at times and threatening to leave. Pt also at times threatening harm staff. Will sedate and restrain for patient and staff safety. 1:1 in place Pt continues to be agitated and yell. Will give additional sedation

## 2021-02-14 VITALS
RESPIRATION RATE: 16 BRPM | DIASTOLIC BLOOD PRESSURE: 69 MMHG | OXYGEN SATURATION: 98 % | TEMPERATURE: 97 F | HEART RATE: 79 BPM | SYSTOLIC BLOOD PRESSURE: 110 MMHG

## 2021-04-19 ENCOUNTER — APPOINTMENT (OUTPATIENT)
Dept: OTOLARYNGOLOGY | Facility: CLINIC | Age: 41
End: 2021-04-19

## 2021-06-19 ENCOUNTER — EMERGENCY (EMERGENCY)
Facility: HOSPITAL | Age: 41
LOS: 0 days | Discharge: HOME | End: 2021-06-19
Attending: EMERGENCY MEDICINE | Admitting: EMERGENCY MEDICINE
Payer: MEDICAID

## 2021-06-19 VITALS
TEMPERATURE: 98 F | HEART RATE: 81 BPM | WEIGHT: 190.92 LBS | OXYGEN SATURATION: 98 % | RESPIRATION RATE: 20 BRPM | SYSTOLIC BLOOD PRESSURE: 141 MMHG | HEIGHT: 63 IN | DIASTOLIC BLOOD PRESSURE: 90 MMHG

## 2021-06-19 DIAGNOSIS — R21 RASH AND OTHER NONSPECIFIC SKIN ERUPTION: ICD-10-CM

## 2021-06-19 DIAGNOSIS — Z98.890 OTHER SPECIFIED POSTPROCEDURAL STATES: Chronic | ICD-10-CM

## 2021-06-19 DIAGNOSIS — F31.9 BIPOLAR DISORDER, UNSPECIFIED: ICD-10-CM

## 2021-06-19 DIAGNOSIS — Z98.891 HISTORY OF UTERINE SCAR FROM PREVIOUS SURGERY: Chronic | ICD-10-CM

## 2021-06-19 DIAGNOSIS — Z33.2 ENCOUNTER FOR ELECTIVE TERMINATION OF PREGNANCY: Chronic | ICD-10-CM

## 2021-06-19 DIAGNOSIS — M19.90 UNSPECIFIED OSTEOARTHRITIS, UNSPECIFIED SITE: ICD-10-CM

## 2021-06-19 DIAGNOSIS — M25.561 PAIN IN RIGHT KNEE: ICD-10-CM

## 2021-06-19 DIAGNOSIS — Z88.6 ALLERGY STATUS TO ANALGESIC AGENT: ICD-10-CM

## 2021-06-19 DIAGNOSIS — Z79.899 OTHER LONG TERM (CURRENT) DRUG THERAPY: ICD-10-CM

## 2021-06-19 DIAGNOSIS — Z88.8 ALLERGY STATUS TO OTHER DRUGS, MEDICAMENTS AND BIOLOGICAL SUBSTANCES STATUS: ICD-10-CM

## 2021-06-19 PROCEDURE — 73564 X-RAY EXAM KNEE 4 OR MORE: CPT | Mod: 26,RT

## 2021-06-19 PROCEDURE — 99284 EMERGENCY DEPT VISIT MOD MDM: CPT

## 2021-06-19 RX ORDER — NORELGESTROMIN AND ETHINYL ESTRADIOL 150; 35 UG/D; UG/D
1 PATCH TRANSDERMAL
Qty: 0 | Refills: 0 | DISCHARGE

## 2021-06-19 RX ORDER — ACETAMINOPHEN 500 MG
650 TABLET ORAL ONCE
Refills: 0 | Status: COMPLETED | OUTPATIENT
Start: 2021-06-19 | End: 2021-06-19

## 2021-06-19 RX ORDER — DEXAMETHASONE 0.5 MG/5ML
10 ELIXIR ORAL ONCE
Refills: 0 | Status: COMPLETED | OUTPATIENT
Start: 2021-06-19 | End: 2021-06-19

## 2021-06-19 RX ORDER — DIPHENHYDRAMINE HCL 50 MG
50 CAPSULE ORAL ONCE
Refills: 0 | Status: COMPLETED | OUTPATIENT
Start: 2021-06-19 | End: 2021-06-19

## 2021-06-19 RX ORDER — PERMETHRIN CREAM 5% W/W 50 MG/G
1 CREAM TOPICAL
Qty: 30 | Refills: 0
Start: 2021-06-19 | End: 2021-06-19

## 2021-06-19 RX ADMIN — Medication 650 MILLIGRAM(S): at 13:11

## 2021-06-19 RX ADMIN — Medication 10 MILLIGRAM(S): at 13:09

## 2021-06-19 RX ADMIN — Medication 50 MILLIGRAM(S): at 13:10

## 2021-06-19 NOTE — ED PROVIDER NOTE - PHYSICAL EXAMINATION
VITAL SIGNS: I have reviewed nursing notes and confirm.  CONSTITUTIONAL: Well-developed; well-nourished; in no acute distress.   SKIN: erythematous, blanching, non-tender rash to both arms and hands  HEAD: Normocephalic; atraumatic.  EYES:  conjunctiva and sclera clear.  ENT: No nasal discharge; airway clear.  CARD: S1, S2 normal; no murmurs, gallops, or rubs. Regular rate and rhythm.   RESP: No wheezes, rales or rhonchi.  EXT: Normal ROM.  No clubbing, cyanosis or edema.   NEURO: Alert, oriented, grossly unremarkable

## 2021-06-19 NOTE — ED PROVIDER NOTE - NS ED ROS FT
ENMT:  No hearing changes, pain, discharge or infections. No neck pain or stiffness.  Cardiac:  No chest pain, SOB   Respiratory:  No cough or respiratory distress. No hemoptysis. No history of asthma or RAD.  GI:  No nausea, vomiting, diarrhea or abdominal pain.  MS:  No myalgia, muscle weakness, joint pain or back pain.  Neuro:  No headache or weakness.  No LOC.  Skin:  + skin rash.   Endocrine: No history of thyroid disease or diabetes.  Except as documented in the HPI,  all other systems are negative.

## 2021-06-19 NOTE — ED PROVIDER NOTE - PATIENT PORTAL LINK FT
You can access the FollowMyHealth Patient Portal offered by [facility] by registering at the following website: [email or web address]. By joining [employer]’s FollowMyHealth portal, you will also be able to view your health information using other applications (apps) compatible with our system.

## 2021-06-19 NOTE — ED PROVIDER NOTE - OBJECTIVE STATEMENT
Pt is a 41y/o female with a pmhx of mood disorder presents today for eval of pruritic rash to both arms and hands x 2 days with no aggravating/alleviating factors. Pt denies new soaps/detergents/travel/foods, SOB, Abd pain, n/v/d.

## 2021-10-10 ENCOUNTER — EMERGENCY (EMERGENCY)
Facility: HOSPITAL | Age: 41
LOS: 0 days | Discharge: HOME | End: 2021-10-10
Attending: STUDENT IN AN ORGANIZED HEALTH CARE EDUCATION/TRAINING PROGRAM | Admitting: STUDENT IN AN ORGANIZED HEALTH CARE EDUCATION/TRAINING PROGRAM
Payer: MEDICAID

## 2021-10-10 VITALS
RESPIRATION RATE: 18 BRPM | SYSTOLIC BLOOD PRESSURE: 166 MMHG | OXYGEN SATURATION: 99 % | TEMPERATURE: 99 F | WEIGHT: 188.94 LBS | DIASTOLIC BLOOD PRESSURE: 71 MMHG | HEIGHT: 63 IN | HEART RATE: 74 BPM

## 2021-10-10 VITALS
HEART RATE: 72 BPM | DIASTOLIC BLOOD PRESSURE: 78 MMHG | RESPIRATION RATE: 18 BRPM | SYSTOLIC BLOOD PRESSURE: 145 MMHG | OXYGEN SATURATION: 99 %
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
## 2021-10-10 DIAGNOSIS — Z88.8 ALLERGY STATUS TO OTHER DRUGS, MEDICAMENTS AND BIOLOGICAL SUBSTANCES STATUS: ICD-10-CM

## 2021-10-10 DIAGNOSIS — R07.89 OTHER CHEST PAIN: ICD-10-CM
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
## 2021-10-10 DIAGNOSIS — F90.9 ATTENTION-DEFICIT HYPERACTIVITY DISORDER, UNSPECIFIED TYPE: ICD-10-CM

## 2021-10-10 DIAGNOSIS — M19.90 UNSPECIFIED OSTEOARTHRITIS, UNSPECIFIED SITE: ICD-10-CM

## 2021-10-10 DIAGNOSIS — M25.512 PAIN IN LEFT SHOULDER: ICD-10-CM
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
## 2021-10-10 DIAGNOSIS — Z98.890 OTHER SPECIFIED POSTPROCEDURAL STATES: Chronic | ICD-10-CM

## 2021-10-10 DIAGNOSIS — M54.6 PAIN IN THORACIC SPINE: ICD-10-CM

## 2021-10-10 DIAGNOSIS — Z33.2 ENCOUNTER FOR ELECTIVE TERMINATION OF PREGNANCY: Chronic | ICD-10-CM

## 2021-10-10 DIAGNOSIS — Z98.891 HISTORY OF UTERINE SCAR FROM PREVIOUS SURGERY: Chronic | ICD-10-CM

## 2021-10-10 DIAGNOSIS — Z87.891 PERSONAL HISTORY OF NICOTINE DEPENDENCE: ICD-10-CM

## 2021-10-10 DIAGNOSIS — F31.9 BIPOLAR DISORDER, UNSPECIFIED: ICD-10-CM

## 2021-10-10 LAB
ALBUMIN SERPL ELPH-MCNC: 4.1 G/DL — SIGNIFICANT CHANGE UP (ref 3.5–5.2)
ALP SERPL-CCNC: 69 U/L — SIGNIFICANT CHANGE UP (ref 30–115)
ALT FLD-CCNC: 11 U/L — SIGNIFICANT CHANGE UP (ref 0–41)
ANION GAP SERPL CALC-SCNC: 11 MMOL/L — SIGNIFICANT CHANGE UP (ref 7–14)
AST SERPL-CCNC: 18 U/L — SIGNIFICANT CHANGE UP (ref 0–41)
BASOPHILS # BLD AUTO: 0.05 K/UL — SIGNIFICANT CHANGE UP (ref 0–0.2)
BASOPHILS NFR BLD AUTO: 0.8 % — SIGNIFICANT CHANGE UP (ref 0–1)
BILIRUB SERPL-MCNC: 0.3 MG/DL — SIGNIFICANT CHANGE UP (ref 0.2–1.2)
BUN SERPL-MCNC: 11 MG/DL — SIGNIFICANT CHANGE UP (ref 10–20)
CALCIUM SERPL-MCNC: 9.2 MG/DL — SIGNIFICANT CHANGE UP (ref 8.5–10.1)
CHLORIDE SERPL-SCNC: 106 MMOL/L — SIGNIFICANT CHANGE UP (ref 98–110)
CO2 SERPL-SCNC: 24 MMOL/L — SIGNIFICANT CHANGE UP (ref 17–32)
CREAT SERPL-MCNC: 0.7 MG/DL — SIGNIFICANT CHANGE UP (ref 0.7–1.5)
EOSINOPHIL # BLD AUTO: 0.11 K/UL — SIGNIFICANT CHANGE UP (ref 0–0.7)
EOSINOPHIL NFR BLD AUTO: 1.7 % — SIGNIFICANT CHANGE UP (ref 0–8)
GLUCOSE SERPL-MCNC: 95 MG/DL — SIGNIFICANT CHANGE UP (ref 70–99)
HCG SERPL QL: NEGATIVE — SIGNIFICANT CHANGE UP
HCT VFR BLD CALC: 37.9 % — SIGNIFICANT CHANGE UP (ref 37–47)
HGB BLD-MCNC: 12.9 G/DL — SIGNIFICANT CHANGE UP (ref 12–16)
IMM GRANULOCYTES NFR BLD AUTO: 0.3 % — SIGNIFICANT CHANGE UP (ref 0.1–0.3)
LYMPHOCYTES # BLD AUTO: 2.09 K/UL — SIGNIFICANT CHANGE UP (ref 1.2–3.4)
LYMPHOCYTES # BLD AUTO: 32 % — SIGNIFICANT CHANGE UP (ref 20.5–51.1)
MAGNESIUM SERPL-MCNC: 2 MG/DL — SIGNIFICANT CHANGE UP (ref 1.8–2.4)
MCHC RBC-ENTMCNC: 31.2 PG — HIGH (ref 27–31)
MCHC RBC-ENTMCNC: 34 G/DL — SIGNIFICANT CHANGE UP (ref 32–37)
MCV RBC AUTO: 91.8 FL — SIGNIFICANT CHANGE UP (ref 81–99)
MONOCYTES # BLD AUTO: 0.73 K/UL — HIGH (ref 0.1–0.6)
MONOCYTES NFR BLD AUTO: 11.2 % — HIGH (ref 1.7–9.3)
NEUTROPHILS # BLD AUTO: 3.54 K/UL — SIGNIFICANT CHANGE UP (ref 1.4–6.5)
NEUTROPHILS NFR BLD AUTO: 54 % — SIGNIFICANT CHANGE UP (ref 42.2–75.2)
NRBC # BLD: 0 /100 WBCS — SIGNIFICANT CHANGE UP (ref 0–0)
PLATELET # BLD AUTO: 232 K/UL — SIGNIFICANT CHANGE UP (ref 130–400)
POTASSIUM SERPL-MCNC: 4 MMOL/L — SIGNIFICANT CHANGE UP (ref 3.5–5)
POTASSIUM SERPL-SCNC: 4 MMOL/L — SIGNIFICANT CHANGE UP (ref 3.5–5)
PROT SERPL-MCNC: 6.4 G/DL — SIGNIFICANT CHANGE UP (ref 6–8)
RBC # BLD: 4.13 M/UL — LOW (ref 4.2–5.4)
RBC # FLD: 13 % — SIGNIFICANT CHANGE UP (ref 11.5–14.5)
SODIUM SERPL-SCNC: 141 MMOL/L — SIGNIFICANT CHANGE UP (ref 135–146)
TROPONIN T SERPL-MCNC: <0.01 NG/ML — SIGNIFICANT CHANGE UP
WBC # BLD: 6.54 K/UL — SIGNIFICANT CHANGE UP (ref 4.8–10.8)
WBC # FLD AUTO: 6.54 K/UL — SIGNIFICANT CHANGE UP (ref 4.8–10.8)

## 2021-10-10 PROCEDURE — 99285 EMERGENCY DEPT VISIT HI MDM: CPT

## 2021-10-10 PROCEDURE — 71046 X-RAY EXAM CHEST 2 VIEWS: CPT | Mod: 26

## 2021-10-10 PROCEDURE — 93010 ELECTROCARDIOGRAM REPORT: CPT | Mod: 76

## 2021-10-10 RX ORDER — QUETIAPINE FUMARATE 200 MG/1
1 TABLET, FILM COATED ORAL
Qty: 0 | Refills: 0 | DISCHARGE

## 2021-10-10 RX ORDER — METHOCARBAMOL 500 MG/1
1000 TABLET, FILM COATED ORAL ONCE
Refills: 0 | Status: COMPLETED | OUTPATIENT
Start: 2021-10-10 | End: 2021-10-10

## 2021-10-10 RX ORDER — METHYLPHENIDATE HCL 5 MG
20 TABLET ORAL ONCE
Refills: 0 | Status: DISCONTINUED | OUTPATIENT
Start: 2021-10-10 | End: 2021-10-10

## 2021-10-10 RX ORDER — KETOROLAC TROMETHAMINE 30 MG/ML
15 SYRINGE (ML) INJECTION ONCE
Refills: 0 | Status: DISCONTINUED | OUTPATIENT
Start: 2021-10-10 | End: 2021-10-10

## 2021-10-10 RX ADMIN — Medication 20 MILLIGRAM(S): at 14:10

## 2021-10-10 RX ADMIN — METHOCARBAMOL 1000 MILLIGRAM(S): 500 TABLET, FILM COATED ORAL at 12:00

## 2021-10-10 RX ADMIN — Medication 15 MILLIGRAM(S): at 12:33

## 2021-10-10 NOTE — ED PROVIDER NOTE - OBJECTIVE STATEMENT
Patient c/o left shoulder pain for several days, and right sided chest pain for several mos, Sx worsened when talking to family, Left shoulder pain started while wt lifting, No SOB, no fever, no BCP use,  no TOB use

## 2021-10-10 NOTE — ED PROVIDER NOTE - PATIENT PORTAL LINK FT
You can access the FollowMyHealth Patient Portal offered by [facility] by registering at the following website: [email or web address]. By joining [email or web address]’s FollowMyHealth portal, you will also be able to view your health information using other applications (apps) compatible with our system.

## 2021-10-10 NOTE — ED PROVIDER NOTE - NSFOLLOWUPCLINICS_GEN_ALL_ED_FT
[facility] ([facility])  Rehabilitation  [facility] [address] [place] [address]  Phone: [phone]  Fax:   Follow Up Time: 1-3 Days

## 2021-10-10 NOTE — ED PROVIDER NOTE - ENMT NEGATIVE STATEMENT, MLM
Is This A New Presentation, Or A Follow-Up?: Rash Ears: no ear pain and no hearing problems. Nose: no nasal congestion and no nasal drainage. Mouth/Throat: no dysphagia, no hoarseness and no throat pain. Neck: no lumps, no pain, no stiffness and no swollen glands.

## 2021-10-10 NOTE — ED PROVIDER NOTE - NSICDXPASTMEDICALHX_GEN_ALL_CORE_FT
PAST MEDICAL HISTORY:  ADHD     Arthritis OA    Bipolar disorder pt stopped lithium 1-1.5 mos ago    Insomnia

## 2021-10-10 NOTE — ED PROVIDER NOTE - CLINICAL SUMMARY MEDICAL DECISION MAKING FREE TEXT BOX
Patient with PMH of ADHD with left shoulder pain for 2 days. Pt noticed that it was started after she was at the gym and did shoulder presses, she felt a pop in shoulder, no fever no chills, no decreased ROM. Well appearing, NAD, non toxic. NCAT PERRLA EOMI neck supple non tender normal wob cta bl rrr abdomen s nt nd no rebound no guarding WWPx4 neuro non focal. Pt tender in the left trap. labs reviewed Xray pt feeling better, will give her dose of ADHD medication and she will refill her medication tomorrow. Results reviewed and discussed with pt and printed for patient. Anticipatory guidance given including close outpatient followup. Strict return precautions given. Pt verbalizes understanding of and agrees with plan.

## 2021-10-10 NOTE — ED ADULT TRIAGE NOTE - CHIEF COMPLAINT QUOTE
R   sided chest   pain  and   L back  pain      " I  have   been  really   stressed  out   and   pulled   my    shoulder  muscle"

## 2021-12-02 ENCOUNTER — TRANSCRIPTION ENCOUNTER (OUTPATIENT)
Age: 41
End: 2021-12-02

## 2022-02-09 NOTE — PATIENT PROFILE BEHAVIORAL HEALTH - NSBHTHTCONTENT_PSY_A_CORE
disorganized Bilobed Flap Text: The defect edges were debeveled with a #15 scalpel blade.  Given the location of the defect and the proximity to free margins a bilobe flap was deemed most appropriate.  Using a sterile surgical marker, an appropriate bilobe flap drawn around the defect.    The area thus outlined was incised deep to adipose tissue with a #15 scalpel blade.  The skin margins were undermined to an appropriate distance in all directions utilizing iris scissors.

## 2022-04-27 ENCOUNTER — EMERGENCY (EMERGENCY)
Facility: HOSPITAL | Age: 42
LOS: 0 days | Discharge: LEFT BEFORE DISCHARGE | End: 2022-04-27
Attending: EMERGENCY MEDICINE | Admitting: EMERGENCY MEDICINE
Payer: MEDICAID

## 2022-04-27 VITALS
RESPIRATION RATE: 20 BRPM | SYSTOLIC BLOOD PRESSURE: 145 MMHG | DIASTOLIC BLOOD PRESSURE: 82 MMHG | TEMPERATURE: 97 F | HEIGHT: 63 IN | HEART RATE: 98 BPM | WEIGHT: 169.98 LBS | OXYGEN SATURATION: 98 %

## 2022-04-27 DIAGNOSIS — G47.00 INSOMNIA, UNSPECIFIED: ICD-10-CM

## 2022-04-27 DIAGNOSIS — M19.90 UNSPECIFIED OSTEOARTHRITIS, UNSPECIFIED SITE: ICD-10-CM

## 2022-04-27 DIAGNOSIS — F90.9 ATTENTION-DEFICIT HYPERACTIVITY DISORDER, UNSPECIFIED TYPE: ICD-10-CM

## 2022-04-27 DIAGNOSIS — R60.9 EDEMA, UNSPECIFIED: ICD-10-CM

## 2022-04-27 DIAGNOSIS — M79.89 OTHER SPECIFIED SOFT TISSUE DISORDERS: ICD-10-CM

## 2022-04-27 DIAGNOSIS — Z98.891 HISTORY OF UTERINE SCAR FROM PREVIOUS SURGERY: Chronic | ICD-10-CM

## 2022-04-27 DIAGNOSIS — R07.9 CHEST PAIN, UNSPECIFIED: ICD-10-CM

## 2022-04-27 DIAGNOSIS — F31.9 BIPOLAR DISORDER, UNSPECIFIED: ICD-10-CM

## 2022-04-27 DIAGNOSIS — Z98.890 OTHER SPECIFIED POSTPROCEDURAL STATES: Chronic | ICD-10-CM

## 2022-04-27 DIAGNOSIS — Z53.29 PROCEDURE AND TREATMENT NOT CARRIED OUT BECAUSE OF PATIENT'S DECISION FOR OTHER REASONS: ICD-10-CM

## 2022-04-27 DIAGNOSIS — Z88.6 ALLERGY STATUS TO ANALGESIC AGENT: ICD-10-CM

## 2022-04-27 DIAGNOSIS — Z33.2 ENCOUNTER FOR ELECTIVE TERMINATION OF PREGNANCY: Chronic | ICD-10-CM

## 2022-04-27 DIAGNOSIS — L55.9 SUNBURN, UNSPECIFIED: ICD-10-CM

## 2022-04-27 PROCEDURE — 99283 EMERGENCY DEPT VISIT LOW MDM: CPT

## 2022-04-27 RX ORDER — DIPHENHYDRAMINE HCL 50 MG
50 CAPSULE ORAL ONCE
Refills: 0 | Status: COMPLETED | OUTPATIENT
Start: 2022-04-27 | End: 2022-04-27

## 2022-04-27 RX ORDER — KETOROLAC TROMETHAMINE 30 MG/ML
15 SYRINGE (ML) INJECTION ONCE
Refills: 0 | Status: DISCONTINUED | OUTPATIENT
Start: 2022-04-27 | End: 2022-04-27

## 2022-04-27 RX ADMIN — Medication 50 MILLIGRAM(S): at 22:49

## 2022-04-27 NOTE — ED PROVIDER NOTE - PHYSICAL EXAMINATION
Physical Exam  Vital Signs: I have reviewed the initial vital signs.  Constitutional: well-nourished, appears stated age, no acute distress  Eyes: Conjunctiva pink, Sclera clear  ENT: OP is clear without exudates, normal dentition, normal gingival, tongue without swelling  Cardiovascular: S1 and S2, regular rate, regular rhythm, well-perfused extremities, radial pulses equal and 2+  Respiratory: unlabored respiratory effort, clear to auscultation bilaterally no wheezing, rales and rhonchi  Gastrointestinal: soft, non-tender abdomen, no pulsatile mass, normal bowl sounds  Musculoskeletal: supple neck, (+) b/l LE edema, pedal pulses 2+ and equal, no midline tenderness  Integumentary: (+) Multiple areas of sunburn to forehead, b/l arms, and back, tender, no current blistering.   Neurologic: awake, alert, motor and sensory functions grossly intact  Psychiatric: appropriate mood, appropriate affect Physical Exam  Vital Signs: I have reviewed the initial vital signs.  Constitutional: well-nourished, appears stated age, no acute distress  Eyes: Conjunctiva pink, Sclera clear  ENT: OP is clear without exudates, normal dentition, normal gingival, tongue without swelling  Cardiovascular: S1 and S2, regular rate, regular rhythm, well-perfused extremities, radial pulses equal and 2+  Respiratory: unlabored respiratory effort, clear to auscultation bilaterally no wheezing, rales and rhonchi  Gastrointestinal: soft, non-tender abdomen, no pulsatile mass, normal bowl sounds  Musculoskeletal: supple neck, (+) b/l LE edema, pedal pulses 2+ and equal, no midline tenderness  Integumentary: (+) Multiple areas of healing sunburn to forehead, b/l arms, and back, tender, no current blistering.   Neurologic: awake, alert, motor and sensory functions grossly intact  Psychiatric: appropriate mood, appropriate affect Belinda with DESIREE Boyd. 55 yr old male with hx of DM,, neuropathy, HTN, bipolar disorder, sleep apnea, testicular cancer presents c/o shortness of breath. Pt reports he was walking to the grocery store and felt SOB. Pt went home and sat down, and sob resolved. Denies any chest pain, fever, chills. n/v/d, or dizziness.   positive productive cough for the last month . no fever. no other episodes of sob. He has concerns about his cpap machine as he unconsciously removes it in the middle of the night. He will address this with his PCP tomorrow.   non smoker   Cardiologist- Dr. Mckenna- last seen 6 months ago, fu appt this friday   pmd- Dr. Fall  Labs, CXR, (ddimer and trop), EKG without concerning findings. Although he had no chest pain, consider poss anginal equivalent, will get 2nd trop at 4pm. If normal, okay to f/u with PCP tomorrow and cardio on Friday as scheduled.     I performed a face to face bedside interview with patient regarding history of present illness, review of symptoms and past medical history. I completed an independent physical exam.  I have discussed the patient's plan of care with Physician Assistant (PA). I agree with note as stated above, having amended the EMR as needed to reflect my findings.   This includes History of Present Illness, HIV, Past Medical/Surgical/Family/Social History, Allergies and Home Medications, Review of Systems, Physical Exam, and any Progress Notes during the time I functioned as the attending physician for this patient.

## 2022-04-27 NOTE — ED PROVIDER NOTE - ATTENDING APP SHARED VISIT CONTRIBUTION OF CARE
pt co sun burn after visiting the DR and not using sunscreen, occurred last week on the first day of the trip and sts she was in the room the rest of the time. also c/o b/l pedal edema. no leg pain, sob, cp, fever or chills. no numbness or waekness.    pt in nad, healing sunburn to face, shoulders, UEs, LEs (peeling epidermis), no bullae or blisters. no cellulitis, no purulence or abscesses. ctab, rrr, ab soft, tn, nd. b/l pedal edema. nml pulses. non pitting. no cords, no homans. no calf tenderness.    will get US duplex, supportive care

## 2022-04-27 NOTE — ED PROVIDER NOTE - NS ED ROS FT
Constitutional: (-) fever, (-) chills  Eyes: (-) visual changes  ENT: (-) nasal congestions  Cardiovascular: (+) chest pain, (-) syncope  Respiratory: (-) cough, (-) shortness of breath, (-) dyspnea,   Gastrointestinal: (-) vomiting, (-) diarrhea, (-)nausea,  Musculoskeletal: (-) neck pain, (-) back pain, (-) joint pain,  Integumentary: (-) rash, (+) sunburn, (+) edema, (-) bruises  Neurological: (-) headache, (-) loc, (-) dizziness, (-) tingling, (-)numbness,  Psychiatric: (-) hallucinations, (-)nervousness, (-)depression, (-)SI/HI  Peripheral Vascular: (-) leg swelling  :  (-)dysuria,  (-) hematuria  Allergic/Immunologic: (-) pruritus

## 2022-04-27 NOTE — ED PROVIDER NOTE - NSFOLLOWUPINSTRUCTIONS_ED_ALL_ED_FT
You have requested to leave the ED against medical advice. I believe you are of sound mind and competent to refuse medical care. You have been advised of the risks of leaving AMA which include but are not limited to death, coma, transient or permanent disability, delay in diagnosis, need for repeat emergency department, or other complications related to your specific problem today. You have been advised that should you change your mind, you are totally welcome and encouraged to return to the emergency department at any time. In no way does an AMA discharge mean that we do not want you to have the best medical care available (ie. Referrals or prescriptions, as necessary). Eloped

## 2022-04-27 NOTE — ED PROVIDER NOTE - BIRTH SEX
[facility] ambulatory encounter  ORTHOPEDIC POSTOP VISIT      DATE OF INJURY: December 24, 2016  DATE OF SURGERY: December 26, 2016  SURGERY PERFORMED: Open reduction internal fixation of left acetabulum fracture    SUBJECTIVE:    Funmi Phelan is a 38 year old female who is here status post the above noted procedure. She is doing well. She reports pain in the left hip. Pain is controlled with 15 mg oxycodone. She had a incisional wound VAC placed after surgery. She is currently at home where her boyfriend and her son are helping take care of her. She denies fevers and chills. She denies numbness and tingling. She is using a wheelchair and walker to mobilize.    PAST HISTORIES:    I have reviewed the past medical history, family history, social history, medications and allergies listed in the medical record as obtained by my nursing staff and support staff and agree with their documentation.    OBJECTIVE:    Physical Examination:    Vitals:   Visit Vitals   • [facility] 12/21/2016      Constitutional:  Well-developed, well-nourished, in no acute distress.  Skin: Warm, dry, intact without rash or lesion.  Neurologic:  Alert and oriented x3.   Musculoskeletal: LLE: The wound VAC is removed today without difficulty. The incision is intact with staples. There is 1 small blister posterior to the incision measuring about 1 cm. There is another blister to the distal and just posterior to the incision measuring approximately 3-4 centimeters in diameter. There is no surrounding erythema. There is no active drainage. There is no purulence. There is no bruising. Positive ankle plantar flexion dorsiflexion. She is able to stand up and sit on the examination table without assistance today.    IMAGING STUDIES:    No imaging studies were reviewed today.    Assessment:    Healing status post open reduction internal fixation of left acetabulum fracture    PLAN:    The patient's wound VAC is removed today without difficulty. New dressing is  applied. Adaptic was placed over the blisters. She may begin to shower and wash this area. She should refrain from bathing or soaking the incision. She should apply new dressings once daily. She was provided with a couple days worth of dressing material. Orders were placed for new dressings to be delivered to her home. She can have her boyfriend at home help with changing the dressings. She will continue to be toe-touch weightbearing. She will follow-up in one week for x-rays and suture removal.    Instructions provided as documented in the after visit summary.    The patient indicated understanding of the diagnosis and agreed with the plan of care.     The patient was seen and examined with Dr. Godfrey who agrees with evaluation, assessment, and plan of care.       Female

## 2022-04-27 NOTE — ED ADULT NURSE REASSESSMENT NOTE - NS ED NURSE REASSESS COMMENT FT1
PT refusing testing. States I just want the pain medicine and to go home to sleep. PT A and O x 4. MD went to eval PT @ bedside. PT signing out AMA verbalized understanding of risks. Educated to return if needed

## 2022-04-27 NOTE — ED PROVIDER NOTE - PROGRESS NOTE DETAILS
Pt has been given benadryl for itchiness. Pt has been instructed to stop her dose of sleep medicine tonight and resume again tomorrow. Pt is refusing US and Xray after repeatedly told the importance of the test and will sign AMA form. While waiting for the imaging, nick leonardo Attempted to locate patient outside of the hospital, but was unable to find the patient.

## 2022-04-27 NOTE — ED PROVIDER NOTE - OBJECTIVE STATEMENT
40 y/o F pmhx bi-polar disorder and ADHD on Ritalin presents to the ED for evaluation of multiple medical complaints. Pt endorses that [place] was on the beach x7 days without sunblock, now with tender sunburn to forehead, back and arms that was previously blistered. Pt also endorses atraumatic LE edema that began 4-5 days ago, progressively worsening associated with a sharp, L lower chest pain that began 10 minutes after arrival in the ED. Pt denies any recent fevers, chills, n/v/d, cough, diaphoresis or SOB. Pt denies history of blood clots in the past. 40 y/o F pmhx bi-polar disorder and ADHD on Ritalin presents to the ED for evaluation of multiple medical complaints. Pt endorses that she recently [place] from [place] where she was on the beach x7 days without sunblock, now with tender sunburn to forehead, back and arms that was previously blistered. Pt also endorses atraumatic LE edema that began 4-5 days ago, progressively worsening. Pt denies any recent fevers, chills, n/v/d, cough, diaphoresis or SOB. Pt denies history of blood clots in the past.

## 2022-04-27 NOTE — ED PROVIDER NOTE - PATIENT PORTAL LINK FT
You can access the FollowMyHealth Patient Portal offered by [facility] by registering at the following website: [email or web address]. By joining [employer]’s FollowMyHealth portal, you will also be able to view your health information using other applications (apps) compatible with our system.

## 2022-04-27 NOTE — ED PROVIDER NOTE - NS ED ATTENDING STATEMENT MOD
This was a shared visit with the ROSALBA. I reviewed and verified the documentation and independently performed the documented:

## 2022-04-28 PROBLEM — F90.9 ATTENTION-DEFICIT HYPERACTIVITY DISORDER, UNSPECIFIED TYPE: Chronic | Status: ACTIVE | Noted: 2021-10-10

## 2022-07-22 ENCOUNTER — EMERGENCY (EMERGENCY)
Facility: HOSPITAL | Age: 42
LOS: 0 days | Discharge: HOME | End: 2022-07-22
Attending: EMERGENCY MEDICINE | Admitting: EMERGENCY MEDICINE

## 2022-07-22 VITALS
DIASTOLIC BLOOD PRESSURE: 84 MMHG | OXYGEN SATURATION: 99 % | HEART RATE: 99 BPM | TEMPERATURE: 99 F | WEIGHT: 175.93 LBS | HEIGHT: 63 IN | SYSTOLIC BLOOD PRESSURE: 125 MMHG | RESPIRATION RATE: 18 BRPM

## 2022-07-22 DIAGNOSIS — Z98.891 HISTORY OF UTERINE SCAR FROM PREVIOUS SURGERY: Chronic | ICD-10-CM

## 2022-07-22 DIAGNOSIS — L02.01 CUTANEOUS ABSCESS OF FACE: ICD-10-CM

## 2022-07-22 DIAGNOSIS — Z33.2 ENCOUNTER FOR ELECTIVE TERMINATION OF PREGNANCY: Chronic | ICD-10-CM

## 2022-07-22 DIAGNOSIS — F31.9 BIPOLAR DISORDER, UNSPECIFIED: ICD-10-CM

## 2022-07-22 DIAGNOSIS — Z88.6 ALLERGY STATUS TO ANALGESIC AGENT: ICD-10-CM

## 2022-07-22 DIAGNOSIS — M19.90 UNSPECIFIED OSTEOARTHRITIS, UNSPECIFIED SITE: ICD-10-CM

## 2022-07-22 DIAGNOSIS — Z98.890 OTHER SPECIFIED POSTPROCEDURAL STATES: Chronic | ICD-10-CM

## 2022-07-22 DIAGNOSIS — G47.00 INSOMNIA, UNSPECIFIED: ICD-10-CM

## 2022-07-22 DIAGNOSIS — F90.9 ATTENTION-DEFICIT HYPERACTIVITY DISORDER, UNSPECIFIED TYPE: ICD-10-CM

## 2022-07-22 DIAGNOSIS — R50.9 FEVER, UNSPECIFIED: ICD-10-CM

## 2022-07-22 PROCEDURE — 99283 EMERGENCY DEPT VISIT LOW MDM: CPT

## 2022-07-22 RX ORDER — CEPHALEXIN 500 MG
1 CAPSULE ORAL
Qty: 28 | Refills: 0
Start: 2022-07-22 | End: 2022-07-28

## 2022-07-22 NOTE — ED ADULT NURSE NOTE - PAIN: PRESENCE, MLM
Patient states she was in ER on 1/3 and was to follow up with Dr. Crespo for reading of chest x-ray and CT scan .  Patient states they also found spots on her lungs.      Please advise     complains of pain/discomfort

## 2022-07-22 NOTE — ED PROVIDER NOTE - CLINICAL SUMMARY MEDICAL DECISION MAKING FREE TEXT BOX
41-year-old female presents to the ED for right eyebrow abscess.  Currently is draining.  No fluctuant mass to drain.  No periorbital cellulitis noted.  EOMI, PERRL.  Started on antibiotics.  Discharged home.  Return precautions given..

## 2022-07-22 NOTE — ED PROVIDER NOTE - NS ED ATTENDING STATEMENT MOD
Pt ambulatory to room 5 with c/o laceration PTA. Pt reports she was using a bush cutter when she \"Sliced\" her finger on it. Pt unsure of last Tetanus INJ. Left hand, 3rd digit has approximately 1cm laceration noted to pad of finger with small amount of sanguinous drainage. Pt tearful. Respirations even and non labored. NAD noted.       Erinn Mariano RN  06/29/20 0154 This was a shared visit with the ROSALBA. I reviewed and verified the documentation and independently performed the documented:

## 2022-07-22 NOTE — ED PROVIDER NOTE - PHYSICAL EXAMINATION
Vital Signs: I have reviewed the initial vital signs.  Constitutional: well-nourished, no acute distress  Musculoskeletal: good ROM of extremity,  no bony tenderness, no deformity, good peripheral pulses  Integumentary: +tender papule to right eyebrow without any surrounding crepitation or fluctuance, no erythema   Neurologic: awake, alert, extremities’ motor and sensory functions grossly intact, no focal deficits  heme: (-) no adenopathy (-)lymphangitis

## 2022-07-22 NOTE — ED PROVIDER NOTE - OBJECTIVE STATEMENT
40 y/o female with hx of ADHD presents to the ED with abscess to right eyebrow worsening over past few days after patient picking at area. patient with subjective fevers at home. no eye pain or injury. no headache or neck pain. patient c/o throbbing pain. symptoms started after patient bleached her eyebrows.

## 2022-07-22 NOTE — ED PROVIDER NOTE - PATIENT PORTAL LINK FT
You can access the FollowMyHealth Patient Portal offered by [facility] by registering at the following website: [email or web address]. By joining [employer]’s FollowMyHealth portal, you will also be able to view your health information using other applications (apps) compatible with our system.

## 2022-07-22 NOTE — ED PROVIDER NOTE - NS ED ROS FT
ROS:     constitutional: no fever, weight loss  msk: no joint pain or difficulty ROM  skin: papule  eyes: eye pain  neuro: no tingling , weakness , difficulty ambulation

## 2022-07-22 NOTE — ED ADULT TRIAGE NOTE - CHIEF COMPLAINT QUOTE
pt presents with abscess to R eyebrow. pt states "I bleached my eyebrows and then I thought I had an ingrown hair so I plucked it out and its been red and puffy x3days"

## 2022-09-26 NOTE — PRE-ANESTHESIA EVALUATION ADULT - NSANTHASARD_GEN_ALL_CORE
2 Mustarde Flap Text: The defect edges were debeveled with a #15 scalpel blade.  Given the size, depth and location of the defect and the proximity to free margins a Mustarde flap was deemed most appropriate.  Using a sterile surgical marker, an appropriate flap was drawn incorporating the defect. The area thus outlined was incised with a #15 scalpel blade.  The skin margins were undermined to an appropriate distance in all directions utilizing iris scissors.

## 2022-12-22 NOTE — ED ADULT TRIAGE NOTE - BP NONINVASIVE SYSTOLIC (MM HG)
130 Patient evaluated for headache, resolved in ED with meds and IV fluids.  Patient also concerned about possible seroma after plastic surgery several weeks ago, evaluated by surgical team, no acute intervention needed.  Advise close follow-up with PMD and plastic surgeon and referral given.  Patient agrees, strict return precautions advised and patient verbalized understanding.

## 2023-01-23 ENCOUNTER — APPOINTMENT (OUTPATIENT)
Dept: ORTHOPEDIC SURGERY | Facility: CLINIC | Age: 43
End: 2023-01-23

## 2023-04-04 ENCOUNTER — EMERGENCY (EMERGENCY)
Facility: HOSPITAL | Age: 43
LOS: 0 days | Discharge: HOME | End: 2023-04-04
Attending: EMERGENCY MEDICINE
Payer: MEDICAID

## 2023-04-04 VITALS
HEART RATE: 78 BPM | SYSTOLIC BLOOD PRESSURE: 122 MMHG | DIASTOLIC BLOOD PRESSURE: 78 MMHG | OXYGEN SATURATION: 100 % | RESPIRATION RATE: 18 BRPM

## 2023-04-04 VITALS
OXYGEN SATURATION: 99 % | HEIGHT: 62 IN | SYSTOLIC BLOOD PRESSURE: 124 MMHG | WEIGHT: 175.05 LBS | HEART RATE: 86 BPM | TEMPERATURE: 98 F | DIASTOLIC BLOOD PRESSURE: 80 MMHG | RESPIRATION RATE: 18 BRPM

## 2023-04-04 DIAGNOSIS — F90.9 ATTENTION-DEFICIT HYPERACTIVITY DISORDER, UNSPECIFIED TYPE: ICD-10-CM

## 2023-04-04 DIAGNOSIS — J06.9 ACUTE UPPER RESPIRATORY INFECTION, UNSPECIFIED: ICD-10-CM

## 2023-04-04 DIAGNOSIS — R05.1 ACUTE COUGH: ICD-10-CM

## 2023-04-04 DIAGNOSIS — Z98.891 HISTORY OF UTERINE SCAR FROM PREVIOUS SURGERY: Chronic | ICD-10-CM

## 2023-04-04 DIAGNOSIS — Z33.2 ENCOUNTER FOR ELECTIVE TERMINATION OF PREGNANCY: Chronic | ICD-10-CM

## 2023-04-04 DIAGNOSIS — Z98.890 OTHER SPECIFIED POSTPROCEDURAL STATES: Chronic | ICD-10-CM

## 2023-04-04 DIAGNOSIS — Z88.6 ALLERGY STATUS TO ANALGESIC AGENT: ICD-10-CM

## 2023-04-04 DIAGNOSIS — J45.909 UNSPECIFIED ASTHMA, UNCOMPLICATED: ICD-10-CM

## 2023-04-04 DIAGNOSIS — R09.81 NASAL CONGESTION: ICD-10-CM

## 2023-04-04 PROCEDURE — 99283 EMERGENCY DEPT VISIT LOW MDM: CPT

## 2023-04-04 PROCEDURE — 99284 EMERGENCY DEPT VISIT MOD MDM: CPT

## 2023-04-04 RX ORDER — ACETAMINOPHEN 500 MG
975 TABLET ORAL ONCE
Refills: 0 | Status: COMPLETED | OUTPATIENT
Start: 2023-04-04 | End: 2023-04-04

## 2023-04-04 RX ORDER — HYDROXYZINE HCL 10 MG
2 TABLET ORAL
Qty: 14 | Refills: 0
Start: 2023-04-04 | End: 2023-04-10

## 2023-04-04 RX ORDER — HYDROXYZINE HCL 10 MG
1 TABLET ORAL
Qty: 7 | Refills: 0
Start: 2023-04-04 | End: 2023-04-10

## 2023-04-04 RX ORDER — DEXAMETHASONE 0.5 MG/5ML
10 ELIXIR ORAL ONCE
Refills: 0 | Status: COMPLETED | OUTPATIENT
Start: 2023-04-04 | End: 2023-04-04

## 2023-04-04 RX ADMIN — Medication 10 MILLIGRAM(S): at 11:47

## 2023-04-04 RX ADMIN — Medication 975 MILLIGRAM(S): at 11:47

## 2023-04-04 NOTE — ED PROVIDER NOTE - OBJECTIVE STATEMENT
Patient is a 42-year-old female past history of ADHD, asthma here for evaluation of sneezing, nasal congestion, productive cough x4 days.  Patient denies fever, chills, chest pain, shortness of breath

## 2023-04-04 NOTE — ED PROVIDER NOTE - PHYSICAL EXAMINATION
VITAL SIGNS: I have reviewed nursing notes and confirm.  CONSTITUTIONAL: Well-developed; well-nourished  SKIN: skin exam is warm and dry, no acute rash.    HEAD: Normocephalic; atraumatic.  EYES:  conjunctiva and sclera clear.  ENT: uvula midline, no sublingual edema No nasal discharge; airway clear.  CARD: S1, S2 normal; no murmurs, gallops, or rubs. Regular rate and rhythm.   RESP: No wheezes, rales or rhonchi.  EXT: Normal ROM.  No clubbing, cyanosis or edema.   NEURO: Alert, oriented, grossly unremarkable

## 2023-04-04 NOTE — ED PROVIDER NOTE - CLINICAL SUMMARY MEDICAL DECISION MAKING FREE TEXT BOX
Viral URI versus seasonal allergies, will discharge supportive care, PMD follow-up.  Patient counseled regarding conditions which should prompt return.

## 2023-04-04 NOTE — ED PROVIDER NOTE - PATIENT PORTAL LINK FT
You can access the FollowMyHealth Patient Portal offered by [facility] by registering at the following website: [email or web address]. By joining [employer]’s FollowMyHealth portal, you will also be able to view your health information using other applications (apps) compatible with our system.

## 2023-04-04 NOTE — ED PROVIDER NOTE - PROGRESS NOTE DETAILS
Patient very well-appearing will DC with hydroxyzine for sinus congestion and difficulty sleeping because of it

## 2023-04-11 NOTE — DISCHARGE NOTE BEHAVIORAL HEALTH - PAST PSYCHIATRIC HISTORY
Render Risk Assessment In Note?: no Detail Level: Zone Recommendation Preamble: The following recommendations were made during the visit: use Vaseline only on areas of concern along with mometasone. dx of bipolar disorder and adhd, has been in treatement 20 years. attends [facility] and is partially compliant. no hx of ipp.

## 2023-04-19 NOTE — ED ADULT NURSE NOTE - PMH
[FreeTextEntry1] : This patient is suffering from biliary colic.  I have offered her a laparoscopic cholecystectomy.  The procedure as well as risks(including, but not limited to bleeding, infection, injury to hollow viscus, injury to bile ducts), benefits (pain relief), and alternatives were explained to the patient.\par \par She questions the necessity of surgery given the fact that she only had 1 attack of biliary colic.  I explained to her that most commonly, once patients have had 1 attack of biliary colic they are very likely to have subsequent attacks.  I am recommending surgery.\par \par Please advise me on the safety of general anesthesia for this pleasant patient Arthritis  OA  Bipolar disorder  pt stopped lithium 1-1.5 mos ago  Insomnia

## 2023-04-25 ENCOUNTER — EMERGENCY (EMERGENCY)
Facility: HOSPITAL | Age: 43
LOS: 0 days | Discharge: LEFT BEFORE DISCHARGE | End: 2023-04-25
Attending: EMERGENCY MEDICINE
Payer: MEDICAID

## 2023-04-25 VITALS
WEIGHT: 179.9 LBS | RESPIRATION RATE: 98 BRPM | HEIGHT: 62 IN | HEART RATE: 98 BPM | TEMPERATURE: 96 F | DIASTOLIC BLOOD PRESSURE: 81 MMHG | SYSTOLIC BLOOD PRESSURE: 133 MMHG | OXYGEN SATURATION: 100 %
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
## 2023-04-25 DIAGNOSIS — R09.81 NASAL CONGESTION: ICD-10-CM

## 2023-04-25 DIAGNOSIS — Z98.890 OTHER SPECIFIED POSTPROCEDURAL STATES: Chronic | ICD-10-CM

## 2023-04-25 DIAGNOSIS — Z98.890 OTHER SPECIFIED POSTPROCEDURAL STATES: ICD-10-CM

## 2023-04-25 DIAGNOSIS — R05.1 ACUTE COUGH: ICD-10-CM

## 2023-04-25 DIAGNOSIS — F31.9 BIPOLAR DISORDER, UNSPECIFIED: ICD-10-CM

## 2023-04-25 DIAGNOSIS — Z98.891 HISTORY OF UTERINE SCAR FROM PREVIOUS SURGERY: Chronic | ICD-10-CM

## 2023-04-25 DIAGNOSIS — Z20.822 CONTACT WITH AND (SUSPECTED) EXPOSURE TO COVID-19: ICD-10-CM

## 2023-04-25 DIAGNOSIS — J06.9 ACUTE UPPER RESPIRATORY INFECTION, UNSPECIFIED: ICD-10-CM

## 2023-04-25 DIAGNOSIS — R51.9 HEADACHE, UNSPECIFIED: ICD-10-CM

## 2023-04-25 DIAGNOSIS — Z87.59 PERSONAL HISTORY OF OTHER COMPLICATIONS OF PREGNANCY, CHILDBIRTH AND THE PUERPERIUM: ICD-10-CM

## 2023-04-25 DIAGNOSIS — M19.90 UNSPECIFIED OSTEOARTHRITIS, UNSPECIFIED SITE: ICD-10-CM

## 2023-04-25 DIAGNOSIS — Z33.2 ENCOUNTER FOR ELECTIVE TERMINATION OF PREGNANCY: Chronic | ICD-10-CM

## 2023-04-25 DIAGNOSIS — Z86.69 PERSONAL HISTORY OF OTHER DISEASES OF THE NERVOUS SYSTEM AND SENSE ORGANS: ICD-10-CM
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
## 2023-04-25 DIAGNOSIS — Z88.8 ALLERGY STATUS TO OTHER DRUGS, MEDICAMENTS AND BIOLOGICAL SUBSTANCES: ICD-10-CM

## 2023-04-25 DIAGNOSIS — F90.9 ATTENTION-DEFICIT HYPERACTIVITY DISORDER, UNSPECIFIED TYPE: ICD-10-CM

## 2023-04-25 DIAGNOSIS — S01.311A LACERATION WITHOUT FOREIGN BODY OF RIGHT EAR, INITIAL ENCOUNTER: ICD-10-CM

## 2023-04-25 DIAGNOSIS — Z53.29 PROCEDURE AND TREATMENT NOT CARRIED OUT BECAUSE OF PATIENT'S DECISION FOR OTHER REASONS: ICD-10-CM

## 2023-04-25 LAB
FLUAV AG NPH QL: SIGNIFICANT CHANGE UP
FLUBV AG NPH QL: SIGNIFICANT CHANGE UP
RSV RNA NPH QL NAA+NON-PROBE: SIGNIFICANT CHANGE UP
SARS-COV-2 RNA SPEC QL NAA+PROBE: SIGNIFICANT CHANGE UP

## 2023-04-25 PROCEDURE — 99283 EMERGENCY DEPT VISIT LOW MDM: CPT | Mod: 25

## 2023-04-25 PROCEDURE — 71046 X-RAY EXAM CHEST 2 VIEWS: CPT

## 2023-04-25 PROCEDURE — 0241U: CPT

## 2023-04-25 PROCEDURE — 99284 EMERGENCY DEPT VISIT MOD MDM: CPT

## 2023-04-25 PROCEDURE — 71046 X-RAY EXAM CHEST 2 VIEWS: CPT | Mod: 26

## 2023-04-25 RX ORDER — ALBUTEROL 90 UG/1
2 AEROSOL, METERED ORAL
Qty: 0 | Refills: 0 | DISCHARGE

## 2023-04-25 RX ORDER — MIRTAZAPINE 45 MG/1
1 TABLET, ORALLY DISINTEGRATING ORAL
Qty: 0 | Refills: 0 | DISCHARGE

## 2023-04-25 RX ORDER — METHYLPHENIDATE HCL 5 MG
36 TABLET ORAL
Qty: 0 | Refills: 0 | DISCHARGE

## 2023-04-25 NOTE — ED PROVIDER NOTE - OBJECTIVE STATEMENT
41 y/o F w past medical history presents to the emergency department with headache nasal congestion and persistent cough for the past 2 weeks.  Patient was seen in the emergency department recently for similar symptoms and was given steroids.  Patient reports fever for 2 days as well with Tmax 100.5 but this has since resolved.  Patient also complaining of right earlobe laceration that she believes occurred while she was sleeping at night with possible earring being pulled out. Denies fevers chills chest pain shortness of breath nausea vomiting diarrhea.

## 2023-04-25 NOTE — ED PROVIDER NOTE - PHYSICAL EXAMINATION
VITAL SIGNS: I have reviewed nursing notes and confirm.  CONSTITUTIONAL: non-toxic, well appearing  SKIN: + R earlobe laceration 1cm, no active bleeding, no rash, no petechiae.  EYES: PERRL, pink conjunctiva, anicteric  ENT: tongue midline, no exudates, MMM, TM nl with no erythema b/l   NECK: Supple; no meningismus  CARD: RRR, no murmurs, equal radial pulses bilaterally 2+  RESP: CTAB, no respiratory distress  ABD: Soft, non-tender, non-distended  EXT: No edema. No calves tenderness  NEURO: Alert, oriented. no focal deficits

## 2023-06-05 ENCOUNTER — EMERGENCY (EMERGENCY)
Facility: HOSPITAL | Age: 43
LOS: 0 days | Discharge: HOME | End: 2023-06-05
Attending: STUDENT IN AN ORGANIZED HEALTH CARE EDUCATION/TRAINING PROGRAM
Payer: MEDICAID

## 2023-06-05 VITALS
RESPIRATION RATE: 18 BRPM | HEIGHT: 62 IN | TEMPERATURE: 98 F | SYSTOLIC BLOOD PRESSURE: 148 MMHG | OXYGEN SATURATION: 98 % | DIASTOLIC BLOOD PRESSURE: 78 MMHG | HEART RATE: 80 BPM

## 2023-06-05 DIAGNOSIS — F90.9 ATTENTION-DEFICIT HYPERACTIVITY DISORDER, UNSPECIFIED TYPE: ICD-10-CM

## 2023-06-05 DIAGNOSIS — Z98.891 HISTORY OF UTERINE SCAR FROM PREVIOUS SURGERY: Chronic | ICD-10-CM

## 2023-06-05 DIAGNOSIS — Z98.890 OTHER SPECIFIED POSTPROCEDURAL STATES: Chronic | ICD-10-CM

## 2023-06-05 DIAGNOSIS — J02.9 ACUTE PHARYNGITIS, UNSPECIFIED: ICD-10-CM

## 2023-06-05 DIAGNOSIS — Z88.6 ALLERGY STATUS TO ANALGESIC AGENT: ICD-10-CM

## 2023-06-05 DIAGNOSIS — M19.90 UNSPECIFIED OSTEOARTHRITIS, UNSPECIFIED SITE: ICD-10-CM

## 2023-06-05 DIAGNOSIS — Z33.2 ENCOUNTER FOR ELECTIVE TERMINATION OF PREGNANCY: Chronic | ICD-10-CM

## 2023-06-05 DIAGNOSIS — F31.9 BIPOLAR DISORDER, UNSPECIFIED: ICD-10-CM

## 2023-06-05 PROCEDURE — 99283 EMERGENCY DEPT VISIT LOW MDM: CPT

## 2023-06-05 PROCEDURE — 99284 EMERGENCY DEPT VISIT MOD MDM: CPT

## 2023-06-05 RX ORDER — DIPHENHYDRAMINE HYDROCHLORIDE AND LIDOCAINE HYDROCHLORIDE AND ALUMINUM HYDROXIDE AND MAGNESIUM HYDRO
30 KIT ONCE
Refills: 0 | Status: COMPLETED | OUTPATIENT
Start: 2023-06-05 | End: 2023-06-05

## 2023-06-05 RX ADMIN — DIPHENHYDRAMINE HYDROCHLORIDE AND LIDOCAINE HYDROCHLORIDE AND ALUMINUM HYDROXIDE AND MAGNESIUM HYDRO 30 MILLILITER(S): KIT at 17:26

## 2023-06-05 NOTE — ED PROVIDER NOTE - CONDITION AT DISCHARGE:
Spoke to pt  Notified of Hailee's message  Pt stated low sugars have self resolved  Pt aware to call if any further issues  Improved

## 2023-06-05 NOTE — ED ADULT NURSE NOTE - NSFALLUNIVINTERV_ED_ALL_ED
Bed/Stretcher in lowest position, wheels locked, appropriate side rails in place/Call bell, personal items and telephone in reach/Instruct patient to call for assistance before getting out of bed/chair/stretcher/Non-slip footwear applied when patient is off stretcher/[place] to call system/Physically safe environment - no spills, clutter or unnecessary equipment/Purposeful proactive rounding/Room/bathroom lighting operational, light cord in reach

## 2023-06-05 NOTE — ED PROVIDER NOTE - OBJECTIVE STATEMENT
41 y/o female with hx of ADHD presents to the ED with sore throat x 2 days . patient states symptoms started after drinking out of a cup in a public place without a straw. patient with fevers yesterday. no ear pain . no cough or congestion. no abdominal pain

## 2023-06-05 NOTE — ED PROVIDER NOTE - PHYSICAL EXAMINATION
Vital Signs: I have reviewed the initial vital signs.  Constitutional: well-nourished, appears stated age, no acute distress  Head: atraumatic and normocephalic  Eyes:PERRLA, EOMI, no nystagmus, clear conjunctiva  ENT: TM b/l clear, oropharynx injected, no dental injury, MMM  Cardiovascular: regular rate, regular rhythm, well-perfused extremities  Respiratory: unlabored respiratory effort, clear to auscultation bilaterally  Neuro: awake, alert, follows commands, oriented, no focal deficits, GCS 15  ;

## 2023-06-05 NOTE — ED ADULT TRIAGE NOTE - GLASGOW COMA SCALE: SCORE, MLM
82 yo female presents with daughter c/o chills and uti diagnosed with UTI today at urgent care center. Patient denies cough, abdominal pain, chest pain. +diarrhea 15

## 2023-06-05 NOTE — ED PROVIDER NOTE - CLINICAL SUMMARY MEDICAL DECISION MAKING FREE TEXT BOX
42-year-old female past medical history as above presents with sore throat itchiness began Saturday patient notes that she has subjective fevers has had strep throat a few months prior no nausea vomiting no abdominal pain patient notes that she gets this every time she drinks from a glass without a straw.  Well appearing, NAD, non toxic. NCAT PERRLA EOMI bilateral erythematous posterior oropharynx mild cervical lymphadenopathy neck supple non tender normal wob cta bl rrr abdomen s nt nd no rebound no guarding WWPx4 neuro non focal.  Differential includes mono versus strep throat.  Patient is not sexually active or no STDs patient denies sharing drinks with any other people will discharge her antibiotics return precautions given

## 2023-06-05 NOTE — ED PROVIDER NOTE - PATIENT PORTAL LINK FT
You can access the FollowMyHealth Patient Portal offered by [facility] by registering at the following website: [email or web address]. By joining [employer]’s FollowMyHealth portal, you will also be able to view your health information using other applications (apps) compatible with our system.

## 2023-06-12 ENCOUNTER — EMERGENCY (EMERGENCY)
Facility: HOSPITAL | Age: 43
LOS: 0 days | Discharge: HOME | End: 2023-06-12
Attending: EMERGENCY MEDICINE
Payer: MEDICAID

## 2023-06-12 VITALS
OXYGEN SATURATION: 99 % | TEMPERATURE: 98 F | HEART RATE: 68 BPM | DIASTOLIC BLOOD PRESSURE: 79 MMHG | SYSTOLIC BLOOD PRESSURE: 140 MMHG | WEIGHT: 175.93 LBS | HEIGHT: 62 IN | RESPIRATION RATE: 18 BRPM

## 2023-06-12 DIAGNOSIS — Z98.890 OTHER SPECIFIED POSTPROCEDURAL STATES: Chronic | ICD-10-CM

## 2023-06-12 DIAGNOSIS — F90.9 ATTENTION-DEFICIT HYPERACTIVITY DISORDER, UNSPECIFIED TYPE: ICD-10-CM

## 2023-06-12 DIAGNOSIS — Z98.891 HISTORY OF UTERINE SCAR FROM PREVIOUS SURGERY: Chronic | ICD-10-CM

## 2023-06-12 DIAGNOSIS — J02.9 ACUTE PHARYNGITIS, UNSPECIFIED: ICD-10-CM

## 2023-06-12 DIAGNOSIS — R05.9 COUGH, UNSPECIFIED: ICD-10-CM

## 2023-06-12 DIAGNOSIS — J06.9 ACUTE UPPER RESPIRATORY INFECTION, UNSPECIFIED: ICD-10-CM

## 2023-06-12 DIAGNOSIS — F31.9 BIPOLAR DISORDER, UNSPECIFIED: ICD-10-CM

## 2023-06-12 DIAGNOSIS — Z88.6 ALLERGY STATUS TO ANALGESIC AGENT: ICD-10-CM

## 2023-06-12 DIAGNOSIS — Z33.2 ENCOUNTER FOR ELECTIVE TERMINATION OF PREGNANCY: Chronic | ICD-10-CM

## 2023-06-12 PROCEDURE — 99283 EMERGENCY DEPT VISIT LOW MDM: CPT

## 2023-06-12 PROCEDURE — 99284 EMERGENCY DEPT VISIT MOD MDM: CPT

## 2023-06-12 RX ORDER — DEXAMETHASONE 0.5 MG/5ML
10 ELIXIR ORAL ONCE
Refills: 0 | Status: COMPLETED | OUTPATIENT
Start: 2023-06-12 | End: 2023-06-12

## 2023-06-12 RX ORDER — ACETAMINOPHEN 500 MG
650 TABLET ORAL ONCE
Refills: 0 | Status: COMPLETED | OUTPATIENT
Start: 2023-06-12 | End: 2023-06-12

## 2023-06-12 RX ADMIN — Medication 10 MILLIGRAM(S): at 19:54

## 2023-06-12 RX ADMIN — Medication 650 MILLIGRAM(S): at 19:17

## 2023-06-12 NOTE — ED PROVIDER NOTE - OBJECTIVE STATEMENT
41 y/o F presenting for cough and sore throat. Reports she feels like she cannot breathe. Pt states she has completed courses of abx and steroids; has also been using inhaler without symptom relief. States she is usually prescribed promethazine and tessalon pearles for these symptoms by her PCP but they are out of office currently and cannot refill meds. PT denies fever.

## 2023-06-12 NOTE — ED PROVIDER NOTE - PHYSICAL EXAMINATION
CONSTITUTIONAL: Well-developed; well-nourished; in no acute distress.   SKIN: Warm, dry  HEAD: Normocephalic; atraumatic  EYES: PERRL, EOMI, normal sclera and conjunctiva   ENT: No nasal discharge; airway clear. Normal oropharynx. No posterior pharyngeal erythema or exudate.  CARD:  Regular rate and rhythm. Normal S1, S2  RESP: No increased WOB. CTA b/l without wheezes, crackles, rhonchi  ABD: Normoactive BS. Soft, nontender, nondistended.  EXT: Normal ROM.   NEURO: Alert, oriented, grossly unremarkable  PSYCH: Cooperative, appropriate.

## 2023-06-12 NOTE — ED PROVIDER NOTE - ATTENDING CONTRIBUTION TO CARE
I have personally performed a history and physical exam on this patient and personally directed the management of the patient. Patient is a 42-year-old female presents for evaluation of cough worse over the past several days nonproductive in nature and she denies any headache visual changes neck pain skin changes she denies any chest pain diaphoresis abdominal pain back pain vomiting or diarrhea patient does use tobacco products    On physical exam patient is normocephalic atraumatic pupils equal round reactive to light accommodation extraocular muscles intact oropharynx clear chest clear to auscultation bilaterally abdomen soft nontender nondistended bowel sounds positive no guarding or rebound extremities full range of motion pedal pulse 2+ radial pulse 2+    Assessment plan patient presents for evaluation of cough her symptoms are most consistent with probable viral infection at this point afebrile no signs of central infection no signs of meningitis no signs of pneumonia on exam patient is otherwise well-appearing well-hydrated given Decadron here in the emergency department improved at this time discharged follow-up to PMD in the next 24 to 48 hours we discussed indications to return

## 2023-06-12 NOTE — ED PROVIDER NOTE - PATIENT PORTAL LINK FT
You can access the FollowMyHealth Patient Portal offered by [facility] by registering at the following website: [email or web address]. By joining [employer]’s FollowMyHealth portal, you will also be able to view your health information using other applications (apps) compatible with our system.

## 2023-06-12 NOTE — ED PROVIDER NOTE - BIRTH SEX
TOTAL SHOULDER REVERSE ARTHROPLASTY  Progress Note    Mason Parada  10/14/2021    Pre-op Diagnosis:   Traumatic tear of right rotator cuff, unspecified tear extent, initial encounter [S46.011A]       Post-Op Diagnosis Codes:     * Traumatic tear of right rotator cuff, unspecified tear extent, initial encounter [S46.011A]    Procedure/CPT® Codes:        Procedure(s):  Reverse Total Shoulder Arthroplasty, biceps tenodesis    Surgeon(s):  Hari Ventura MD    Anesthesia: General with Block    Staff:   Circulator: Spurling, Shannon, RN; Juan Baptiste RN  Scrub Person: Ricardo Byrd RN; Marce Hays  Vendor Representative: Bonifacio Frey  Assistant: Arelis Mujica APRN  Assistant: Arelis Mujica APRN      Estimated Blood Loss: minimal    Urine Voided: * No values recorded between 10/14/2021 10:28 AM and 10/14/2021 11:59 AM *    Specimens:                None          Drains:   [REMOVED] Closed/Suction Drain 1 Left Breast Bulb 15 Fr. (Removed)       [REMOVED] Closed/Suction Drain 2 Left Breast Bulb 15 Fr. (Removed)       Findings: see dictation    Complications: none    Assistant: Arelis Mujica APRN  was responsible for performing the following activities: Retraction and their skilled assistance was necessary for the success of this case.    Hari Ventura MD     Date: 10/14/2021  Time: 11:59 EDT      
Female

## 2023-06-12 NOTE — ED PROVIDER NOTE - CLINICAL SUMMARY MEDICAL DECISION MAKING FREE TEXT BOX
patient presents for evaluation of cough her symptoms are most consistent with probable viral infection at this point afebrile no signs of central infection no signs of meningitis no signs of pneumonia on exam patient is otherwise well-appearing well-hydrated given Decadron here in the emergency department improved at this time discharged follow-up to PMD in the next 24 to 48 hours we discussed indications to return

## 2023-06-12 NOTE — ED PROVIDER NOTE - NSFOLLOWUPINSTRUCTIONS_ED_ALL_ED_FT
Robitussin-DM has been sent to your pharmacy.    Viral Respiratory Infection    A viral respiratory infection is an illness that affects parts of the body used for breathing, like the lungs, nose, and throat. It is caused by a germ called a virus. Symptoms can include runny nose, coughing, sneezing, fatigue, body aches, sore throat, fever, or headache. Over the counter medicine can be used to manage the symptoms but the infection typically goes away on its own in 5 to 10 days.     SEEK IMMEDIATE MEDICAL CARE IF YOU HAVE ANY OF THE FOLLOWING SYMPTOMS: shortness of breath, chest pain, fever over 10 days, or lightheadedness/dizziness.

## 2023-06-12 NOTE — ED ADULT NURSE NOTE - NSFALLUNIVINTERV_ED_ALL_ED
Bed/Stretcher in lowest position, wheels locked, appropriate side rails in place/Call bell, personal items and telephone in reach/Instruct patient to call for assistance before getting out of bed/chair/stretcher/Non-slip footwear applied when patient is off stretcher/[place] to call system/Physically safe environment - no spills, clutter or unnecessary equipment/Purposeful proactive rounding/Room/bathroom lighting operational, light cord in reach

## 2023-07-04 NOTE — BEHAVIORAL HEALTH ASSESSMENT NOTE - NSBHREFERDETAILS_PSY_A_CORE_FT
[Sneakers] : roger [Mental health eval

## 2023-07-27 NOTE — ED ADULT TRIAGE NOTE - TEMPERATURE IN CELSIUS (DEGREES C)
July 31, 2023      Be Le[place]  [address] [facility] [address]        Dear ,    We are writing to inform you of your test results.    Your pathology report was    Showed an Adenomatous polyp. This is a benign (not cancerous) growth; however these can become cancer over time. This polyp is usually removed completely at the time of the biopsy. Because it is an Adenomatous polyp you do have a slight higher risk for colon cancer. This is why you will need a repeat colonoscopy in approximately 2 years .  If you do have further questions please don t hesitate to call the below number.      To make an appointment Please call [phone], for  [facility] or  for [facility], to schedule a follow up appointment in 2 weeks.       Resulted Orders   Surgical Pathology Exam   Result Value Ref Range    Case Report       Surgical Pathology Report                         Case: [id number]                                  Authorizing Provider:  Radames Harper MD Collected:           07/27/2023 01:11 PM          Ordering Location:     [facility]   Received:            07/27/2023 01:28 PM                                 [place]                                                                      Pathologist:           Rena Canada MD PhD                                                      Specimens:   A) - Large Intestine, Colon, Cecum                                                                  B) - Large Intestine, Colon, Ascending, Mid ascending                                      Final Diagnosis       A(1).   Colon, Cecum, polyp, polypectomy:  -Tubular adenoma  -Negative for high-grade dysplasia and malignancy.      B(2).  Colon, Ascending, polyps x2, polypectomy:  -Tubular adenoma, fragments  -Negative for high-grade dysplasia and malignancy.    -Fragments of hyperplastic polyp  -Negative for dysplasia or malignancy.          Clinical  "Information       Procedure:  COLONOSCOPY, FLEXIBLE, WITH LESION REMOVAL USING SNARE  COLONOSCOPY, WITH HEMORRHAGE CONTROL  TATTOOING, WITH COLONOSCOPY  Pre-op Diagnosis: Special screening for malignant neoplasm of colon [Z12.11]  Post-op Diagnosis: Z12.11 - Special screening for malignant neoplasm of colon [ICD-10-CM]      Gross Description       A(1). Large Intestine, Colon, Cecum, :  The specimen is received in formalin, labeled with the patient's name, medical record number and other identifying information designated \"cecal polyp\". It consists of 2 tan soft tissue fragments, 0.3 and 1.2 cm.  The largest fragment, which is received partially disrupted, is inked black and sectioned.  Entirely submitted in 2 cassettes.    B(2). Large Intestine, Colon, Ascending, Mid ascending:  The specimen is received in formalin, labeled with the patient's name, medical record number and other identifying information designated \"mid ascending colon polyp\". It consists of multiple tan soft tissue fragments, less than 0.1-0.7 cm, admixed with colonic debris.  Entirely submitted in 8 cassettes.  (Ronda Resendiz [facility] Tech)      Microscopic Description       Microscopic examination was performed.      Performing Labs       The technical component of this testing was completed at [facility]      Case Images         If you have any questions or concerns, please call the clinic at the number listed above.       Sincerely,      Radames Harper MD            " 35.8

## 2023-08-21 ENCOUNTER — APPOINTMENT (OUTPATIENT)
Dept: ORTHOPEDIC SURGERY | Facility: CLINIC | Age: 43
End: 2023-08-21

## 2023-09-21 ENCOUNTER — EMERGENCY (EMERGENCY)
Facility: HOSPITAL | Age: 43
LOS: 0 days | Discharge: HOME | End: 2023-09-21
Attending: EMERGENCY MEDICINE
Payer: MEDICAID

## 2023-09-21 ENCOUNTER — APPOINTMENT (OUTPATIENT)
Dept: ORTHOPEDIC SURGERY | Facility: CLINIC | Age: 43
End: 2023-09-21

## 2023-09-21 VITALS
RESPIRATION RATE: 18 BRPM | SYSTOLIC BLOOD PRESSURE: 124 MMHG | HEART RATE: 83 BPM | HEIGHT: 63 IN | DIASTOLIC BLOOD PRESSURE: 91 MMHG | WEIGHT: 175.93 LBS | TEMPERATURE: 98 F | OXYGEN SATURATION: 97 %

## 2023-09-21 DIAGNOSIS — Z98.891 HISTORY OF UTERINE SCAR FROM PREVIOUS SURGERY: Chronic | ICD-10-CM

## 2023-09-21 DIAGNOSIS — R50.9 FEVER, UNSPECIFIED: ICD-10-CM

## 2023-09-21 DIAGNOSIS — Z88.6 ALLERGY STATUS TO ANALGESIC AGENT: ICD-10-CM

## 2023-09-21 DIAGNOSIS — J02.9 ACUTE PHARYNGITIS, UNSPECIFIED: ICD-10-CM

## 2023-09-21 DIAGNOSIS — R05.8 OTHER SPECIFIED COUGH: ICD-10-CM

## 2023-09-21 DIAGNOSIS — Z33.2 ENCOUNTER FOR ELECTIVE TERMINATION OF PREGNANCY: Chronic | ICD-10-CM

## 2023-09-21 DIAGNOSIS — Z98.890 OTHER SPECIFIED POSTPROCEDURAL STATES: Chronic | ICD-10-CM

## 2023-09-21 DIAGNOSIS — J06.9 ACUTE UPPER RESPIRATORY INFECTION, UNSPECIFIED: ICD-10-CM

## 2023-09-21 PROCEDURE — 99284 EMERGENCY DEPT VISIT MOD MDM: CPT

## 2023-09-21 PROCEDURE — 99282 EMERGENCY DEPT VISIT SF MDM: CPT

## 2023-09-21 RX ORDER — DEXAMETHASONE 0.5 MG/5ML
10 ELIXIR ORAL ONCE
Refills: 0 | Status: COMPLETED | OUTPATIENT
Start: 2023-09-21 | End: 2023-09-21

## 2023-09-21 RX ADMIN — Medication 10 MILLIGRAM(S): at 12:03

## 2023-09-21 NOTE — ED PROVIDER NOTE - OBJECTIVE STATEMENT
Patient is a 42-year-old female history of bipolar disorder anxiety here for evaluation of subjective fever, dry cough and sore throat for 3 days.  Patient denies shortness of breath, chest pain, nausea, vomiting

## 2023-09-21 NOTE — ED PROVIDER NOTE - PATIENT PORTAL LINK FT
You can access the FollowMyHealth Patient Portal offered by [facility] by registering at the following website: [email or web address]. By joining [employer]’s FollowMyHealth portal, you will also be able to view your health information using other applications (apps) compatible with our system.

## 2023-09-21 NOTE — ED ADULT TRIAGE NOTE - WEIGHT IN LBS
175.9 Island Pedicle Flap Text: The defect edges were debeveled with a #15 scalpel blade.  Given the location of the defect, shape of the defect and the proximity to free margins an island pedicle advancement flap was deemed most appropriate.  Using a sterile surgical marker, an appropriate advancement flap was drawn incorporating the defect, outlining the appropriate donor tissue and placing the expected incisions within the relaxed skin tension lines where possible.    The area thus outlined was incised deep to adipose tissue with a #15 scalpel blade.  The skin margins were undermined to an appropriate distance in all directions around the primary defect and laterally outward around the island pedicle utilizing iris scissors.  There was minimal undermining beneath the pedicle flap.

## 2023-10-30 ENCOUNTER — APPOINTMENT (OUTPATIENT)
Dept: ORTHOPEDIC SURGERY | Facility: CLINIC | Age: 43
End: 2023-10-30

## 2023-11-06 ENCOUNTER — APPOINTMENT (OUTPATIENT)
Dept: ORTHOPEDIC SURGERY | Facility: CLINIC | Age: 43
End: 2023-11-06

## 2023-11-06 NOTE — PRE-ANESTHESIA EVALUATION ADULT - HEIGHT IN CM
160.02 Dorsal Nasal Flap Text: The defect edges were debeveled with a #15 scalpel blade. Given the location of the defect and the proximity to free margins a dorsal nasal flap was deemed most appropriate. Using a sterile surgical marker, an appropriate dorsal nasal flap was drawn around the defect. The area thus outlined was incised deep to adipose tissue with a #15 scalpel blade. The skin margins were undermined to an appropriate distance in all directions utilizing iris scissors. Following this, the designed flap was carried into the primary defect and sutured into place.

## 2023-12-11 ENCOUNTER — APPOINTMENT (OUTPATIENT)
Dept: ORTHOPEDIC SURGERY | Facility: CLINIC | Age: 43
End: 2023-12-11

## 2024-01-26 NOTE — ASU PREOP CHECKLIST - SPO2 (%)
98
86-year-old female unknown medical hx (no documentation in chart, family not at bedside and no contact phone numbers available) sent in by family from home for a foot wound. Unknown how long this has been present. Patient is nonverbal, bedbound, contracted, unable to provide hx. Labs and XR foot ordered, will admit.

## 2024-02-03 ENCOUNTER — EMERGENCY (EMERGENCY)
Facility: HOSPITAL | Age: 44
LOS: 0 days | Discharge: HOME | End: 2024-02-03
Attending: EMERGENCY MEDICINE
Payer: MEDICAID

## 2024-02-03 VITALS
DIASTOLIC BLOOD PRESSURE: 95 MMHG | WEIGHT: 179.9 LBS | HEIGHT: 67 IN | RESPIRATION RATE: 17 BRPM | TEMPERATURE: 98 F | OXYGEN SATURATION: 100 % | HEART RATE: 106 BPM | SYSTOLIC BLOOD PRESSURE: 148 MMHG

## 2024-02-03 DIAGNOSIS — F31.9 BIPOLAR DISORDER, UNSPECIFIED: ICD-10-CM

## 2024-02-03 DIAGNOSIS — F90.9 ATTENTION-DEFICIT HYPERACTIVITY DISORDER, UNSPECIFIED TYPE: ICD-10-CM

## 2024-02-03 DIAGNOSIS — Z33.2 ENCOUNTER FOR ELECTIVE TERMINATION OF PREGNANCY: Chronic | ICD-10-CM

## 2024-02-03 DIAGNOSIS — R07.81 PLEURODYNIA: ICD-10-CM

## 2024-02-03 DIAGNOSIS — Z88.6 ALLERGY STATUS TO ANALGESIC AGENT: ICD-10-CM

## 2024-02-03 DIAGNOSIS — Z98.891 HISTORY OF UTERINE SCAR FROM PREVIOUS SURGERY: Chronic | ICD-10-CM

## 2024-02-03 DIAGNOSIS — Z98.890 OTHER SPECIFIED POSTPROCEDURAL STATES: Chronic | ICD-10-CM

## 2024-02-03 PROCEDURE — 71046 X-RAY EXAM CHEST 2 VIEWS: CPT | Mod: 26

## 2024-02-03 PROCEDURE — 99284 EMERGENCY DEPT VISIT MOD MDM: CPT

## 2024-02-03 PROCEDURE — 71046 X-RAY EXAM CHEST 2 VIEWS: CPT

## 2024-02-03 PROCEDURE — 99283 EMERGENCY DEPT VISIT LOW MDM: CPT | Mod: 25

## 2024-02-03 RX ORDER — METHOCARBAMOL 500 MG/1
2 TABLET, FILM COATED ORAL
Qty: 18 | Refills: 0
Start: 2024-02-03 | End: 2024-02-05

## 2024-02-03 RX ORDER — METHOCARBAMOL 500 MG/1
1000 TABLET, FILM COATED ORAL ONCE
Refills: 0 | Status: COMPLETED | OUTPATIENT
Start: 2024-02-03 | End: 2024-02-03

## 2024-02-03 RX ADMIN — METHOCARBAMOL 1000 MILLIGRAM(S): 500 TABLET, FILM COATED ORAL at 01:21

## 2024-02-03 NOTE — ED PROVIDER NOTE - CLINICAL SUMMARY MEDICAL DECISION MAKING FREE TEXT BOX
43-year-old female, history of bipolar disorder, ADHD, fell 3 days ago, complaining of left rib pain.  Exam unremarkable.  Chest x-ray no acute disease.  Given Robaxin.  Will DC to follow-up with PCP.

## 2024-02-03 NOTE — ED PROVIDER NOTE - OBJECTIVE STATEMENT
42 yo F pmhx bipolar disorder presenting to the ED for eval of L rib pain s/p mechanical fall down 2 steps 3 days ago. states she had a trip and fall. no head injury or loc, not on anticoags. has been taking tylenol with minimal relief. denies any difficulty breathing.

## 2024-02-03 NOTE — ED ADULT NURSE NOTE - NSFALLUNIVINTERV_ED_ALL_ED
Bed/Stretcher in lowest position, wheels locked, appropriate side rails in place/Call bell, personal items and telephone in reach/Instruct patient to call for assistance before getting out of bed/chair/stretcher/Non-slip footwear applied when patient is off stretcher/[place] to call system/Physically safe environment - no spills, clutter or unnecessary equipment/Purposeful proactive rounding/Room/bathroom lighting operational, light cord in reach

## 2024-02-03 NOTE — ED PROVIDER NOTE - NS ED ATTENDING STATEMENT MOD
I have seen and examined this patient and fully participated in the care of this patient as the teaching attending.  The service was shared with the ROSALBA.  I reviewed and verified the documentation.

## 2024-02-03 NOTE — ED PROVIDER NOTE - PATIENT PORTAL LINK FT
You can access the FollowMyHealth Patient Portal offered by [facility] by registering at the following website: [email or web address]. By joining [employer]’s FollowMyHealth portal, you will also be able to view your health information using other applications (apps) compatible with our system.

## 2024-02-03 NOTE — ED ADULT TRIAGE NOTE - LOCATION:
Physical Therapy  PT Acute Evaluation     Therapy Triage Evaluation: OT evaluation is not warranted at this time.  Dressing/Grooming task., Sequencing ADL task., completed via approved triage process to assess safety, balance, mobility, memory, cognition and functional independence.  Results and recommendations discussed with Occupational therapist,, RN, and and patient/family..      Pt seen on 3EF unit.                                                Frequency Comments: T,TH (obs, triage OT out)  Visit Type: initial evaluation  Treatment diagnosis: Hip pain   Precautions:  Medical precautions: ; standard precautions.   Lines:     Basic: IV      Lines in chart and on patient reviewed, cautions maintained throughout session.  Hearing: no hearing deficits  Vision:     Current vision: no visual deficits      SUBJECTIVE                                                                                                            Patient agreed to participate in therapy this date.  \"Oh I'm so happy to see you, I owe most of my body to you therapists.\"  Prior treatment in the past year for same condition: outpatient PTPatient has not been hospitalized, in a skilled nursing facility, or seen by home health in the last 30 days.  Patient / Family Goal: return to previous functional status and maximize function      OBJECTIVE                                                                                                              Level of consciousness: alert    Oriented to person, place, time and situation     Arousal alertness: appropriate responses to stimuli  Patient activity tolerance: 2 to 1 activity to rest  Range of Motion (measured in degrees unless otherwise noted, active unless indicated)  WFL: RLE, LLE  Strength (out of 5 unless otherwise indicated)   WFL: LLE, RLE  Balance    Sitting: Static: independent, Dynamic: independent    Standing - Firm Surface - Eyes Open: Static: independent  Dynamic: supervision  Balance  Details: supv gait no AD, modified independent with 4ww   Bed Mobility:      Supine to sit: modified independent  Training completed:    Tasks: all aspects of bed mobility    Education details: patient safety    HOB slightly elevated  Transfers:    Assistive devices: 1 person and gait belt    Sit to stand: independent    Stand to sit: independent    Stand pivot: independent    Toilet: independent  Training completed:    Tasks: sit to stand, stand to sit and stand pivot    Education details: patient safety    Distant supv for IV line management   Gait/Ambulation:     Assistance: modified independent   Assistive device: 4-wheeled walker, 1 person and gait belt    Distance (ft): 300    Type: trendelenburg    Deviation in foot placement: narrow base of support    Surface: even  Gait/Ambulation, Trial 2:    Assistance: supervision    Assistive device: 1 person and gait belt    Distance (ft): 20; 20;     Pattern: shuffle  Training Completed:    Tasks: gait training on level surfaces    Education details: body mechanics, patient safety and patient requires additional training    R hip drop noted with prolonged distance and hip \"ache\" with progressive distances, cues fro glut activation at heel strike   supv short distance in room no AD for line management and safety         Interventions                                                                                                       Seated    Lower Extremity: Bilateral: seated hip flexion, toe raises, heel raises, knee extensions and knee flexion, AROM, 10 reps, 2 sets  Additional exercise details: B gastroc and hamstring stretch x 30 sec each in sitting  STS x 3 with tactile feedback to promoted B ER at hips> instructed to complete with BUE support on chair as improves alignment   Skilled input: Verbal instruction/cues and tactile instruction/cues  Verbal Consent: Writer verbally educated and received verbal consent for hand placement, positioning of patient, and  techniques to be performed today from patient for therapist position for techniques and hand placement and palpation for techniques as described above and how they are pertinent to the patient's plan of care.        ASSESSMENT                                                                                                                Impairments: strength, endurance, pain and balance deficits  Functional Limitations: stair climbing and ambulation    PT evaluation and treatment completed. Pt is a 72 year old female admitted 8/15/20 c/o stomach pain and N/V. PMH includes R hip ORIF and B TKA, pt currently participating in OP PT for R hip pain. Pt comes from mobile home with spouse, no stairs, spends [place] in [place]. Pt previously modified independent with all functional mobility (uses 4ww vs QC for outdoor/community ambulation).     Pt currently presents near baseline, cues and progression of BLE strengthening/stretching for pain relief and improved hip/knee alignment with functional tasks. Requires supv gait no AD in room for overall safety and line management. OT triaged out as pt independent with toileting and donning robe. Anticipate D/C continue OP PT.        Discharge Recommendations  Recommendation for Discharge: PT WI: Home, OP therapy(continue OP PT)         PT/OT Mobility Equipment for Discharge: no needs, has 4ww and QC      PT Identified Barriers to Discharge: medical only   Skilled therapy is required to address these limitations in attempt to maximize the patient's independence.  Predicted patient presentation: Low (stable) - Patient comorbidities and complexities, as defined above, will have little effect on progress for prescribed plan of care.      End of Session:   Location: in chair  Safety measures: call light within reach and lines intact  Handoff to: nurse assistant            PLAN                                                                                                                             Suggestions for next session as indicated: Progress BLE strengthening, gait without AD, standing balance activities      Frequency Comments: Frequency Comments: T,TH (obs, triage OT out)    Interventions: balance, body mechanics, gait training, neuromuscular re-education, strengthening, continued evaluation, equipment eval/education, HEP train/position, endurance training and community reintegration  Agreement to plan and goals: patient agrees with goals and treatment plan        GOALS:  Long Term Goals: (to be met by time of discharge from hospital)  Ambulation (even): Patient will ambulate on even surface for 250 feet with independent.   Curb step: Patient will ambulate curb step with no device, independent.   Exercise home program: patient to demonstrate and state appropriate assist with exercises as instructed.       Documented in the chart in the following areas: Prior Level of Function. Pain. Assessment.      Referring Provider: No ref. provider found  Admitting diagnosis: Ileus (CMS/HCC) (K56.7);Constipation, unspecified constipation type (K59.00)    Recorded hospital problem list:  There are no active hospital problems to display for this patient.  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           Left arm;

## 2024-02-03 NOTE — ED PROVIDER NOTE - PHYSICAL EXAMINATION
GENERAL: Well-nourished, Well-developed. NAD.  HEAD: No visible or palpable bumps or hematomas. No ecchymosis behind ears B/L.  Neck: Supple. No cervical midline TTP. No paravertebral TTP to traps. FROM  CVS: No reproducible chest wall tenderness. Normal S1,S2. No murmurs appreciated on auscultation   RESP: No use of accessory muscles. Chest rise symmetrical with good expansion. Lungs clear to auscultation B/L. No wheezing, rales, or rhonchi auscultated.  GI: Normal auscultation of bowel sounds in all 4 quadrants. Soft, Nontender, Nondistended. No guarding or rebound tenderness. No CVAT B/L.  MSK: Extremities w/o deformity or ttp. No visible signs of trauma such as ecchymosis, erythema, or swelling. FROM of upper and lower extremities B/L. No midline spinal TTP. FROM of back with flexion and extension.  Skin: Warm, Dry. No rashes or lesions. Good cap refill < 2 sec B/L.  EXT: Radial and pedal pulses present B/L. No calf tenderness or swelling B/L. No palpable cords. No pedal edema B/L.

## 2024-02-03 NOTE — ED PROVIDER NOTE - ATTENDING CONTRIBUTION TO CARE
43-year-old female, history of bipolar disorder, ADHD, fell 3 days ago while going down stairs, complains of left posterior rib pain described as spasms.  Exam shows alert patient in no distress, HEENT NCAT PERRL, neck nontender, lungs clear, RR S1S2, abdomen soft NT +BS, no CCE, skin no rash, no vertebral tenderness, neuro A&OX3 GCS 15 no deficits.

## 2024-02-05 ENCOUNTER — APPOINTMENT (OUTPATIENT)
Dept: ORTHOPEDIC SURGERY | Facility: CLINIC | Age: 44
End: 2024-02-05
Payer: MEDICAID

## 2024-02-05 VITALS — WEIGHT: 173 LBS | BODY MASS INDEX: 30.65 KG/M2 | HEIGHT: 63 IN

## 2024-02-05 DIAGNOSIS — S39.92XA UNSPECIFIED INJURY OF LOWER BACK, INITIAL ENCOUNTER: ICD-10-CM

## 2024-02-05 PROCEDURE — 72070 X-RAY EXAM THORAC SPINE 2VWS: CPT

## 2024-02-05 PROCEDURE — 99203 OFFICE O/P NEW LOW 30 MIN: CPT

## 2024-02-05 NOTE — HISTORY OF PRESENT ILLNESS
[de-identified] : Patient is a 43-year-old female here for evaluation of back.  Patient states about 5 days ago she had a fall down her steps.  Patient states that she fell on her left ribs/back area.  Patient was immediate pain.  Patient went to emergency room, rib x-rays were taken and read as negative.  Patient was told that she had a contusion.  Patient states the day after she was seen she was taking naproxen and Robaxin and felt much better and walked about 5 miles.  Patient is following up today due to pain.  Denies new injury.  Denies numbness or tingling, denies instability, denies trouble breathing.

## 2024-02-05 NOTE — IMAGING
[de-identified] : Spine exam: No effusion, no ecchymosis, no erythema, no midline tenderness to spine.  Mild tenderness to palpation to left-sided posterior/lateral thoracic ribs.  Mild decreased range of motion of back secondary to pain.  Neurovascular intact

## 2024-02-05 NOTE — DISCUSSION/SUMMARY
[de-identified] : Discussed x-rays in detail with patient, negative.  Patient has contusion of left ribs/back.  Advised rest, ice/heat, range of motion, advised continuation of naproxen and Robaxin as needed.  Activity modification.  Discussed with patient generally with contusions first 2 weeks at the worst, can take 4 to 6 weeks to fully heal.  If patient is having worsening back pain after 2 to 4 weeks advised to follow-up with pain and spine for further evaluation.  Call if any questions or concerns.  Patient understands agrees with plan.

## 2024-02-05 NOTE — DATA REVIEWED
[FreeTextEntry1] : X-ray thoracic spine: No acute fractures, subluxations, dislocations. [FreeTextEntry2] : Chest x-ray [facility]: No acute fractures, subluxations, dislocations

## 2024-02-13 ENCOUNTER — APPOINTMENT (OUTPATIENT)
Dept: ORTHOPEDIC SURGERY | Facility: CLINIC | Age: 44
End: 2024-02-13

## 2024-02-26 ENCOUNTER — APPOINTMENT (OUTPATIENT)
Dept: ORTHOPEDIC SURGERY | Facility: CLINIC | Age: 44
End: 2024-02-26

## 2024-03-19 ENCOUNTER — APPOINTMENT (OUTPATIENT)
Dept: ORTHOPEDIC SURGERY | Facility: CLINIC | Age: 44
End: 2024-03-19

## 2024-04-01 NOTE — ED PROVIDER NOTE - PMH
Reason for call: NOT A DUPLICATE!   Geovanna from [facility] state they received 30D w refills they need a full 90D supply  to maximize patient plan.    [x] Refill   [] Prior Auth  [] Other:     Office:   [x] PCP/Provider -   [] Specialty/Provider -     Medication: esomeprazole     Dose/Frequency: 20 mg BID     Quantity: 90D supply w refill     Pharmacy: [facility]     Does the patient have enough for 3 days?   [] Yes   [x] No - Send as HP to POD    
Arthritis  OA  Bipolar disorder  pt stopped lithium 1-1.5 mos ago  Insomnia

## 2024-04-08 ENCOUNTER — EMERGENCY (EMERGENCY)
Facility: HOSPITAL | Age: 44
LOS: 0 days | Discharge: HOME | End: 2024-04-08
Attending: EMERGENCY MEDICINE
Payer: MEDICAID

## 2024-04-08 VITALS
WEIGHT: 179.9 LBS | OXYGEN SATURATION: 100 % | TEMPERATURE: 98 F | DIASTOLIC BLOOD PRESSURE: 56 MMHG | HEART RATE: 62 BPM | SYSTOLIC BLOOD PRESSURE: 121 MMHG | HEIGHT: 63 IN | RESPIRATION RATE: 18 BRPM

## 2024-04-08 DIAGNOSIS — Z33.2 ENCOUNTER FOR ELECTIVE TERMINATION OF PREGNANCY: Chronic | ICD-10-CM

## 2024-04-08 DIAGNOSIS — Z88.6 ALLERGY STATUS TO ANALGESIC AGENT: ICD-10-CM

## 2024-04-08 DIAGNOSIS — J32.9 CHRONIC SINUSITIS, UNSPECIFIED: ICD-10-CM

## 2024-04-08 DIAGNOSIS — Z98.891 HISTORY OF UTERINE SCAR FROM PREVIOUS SURGERY: Chronic | ICD-10-CM

## 2024-04-08 DIAGNOSIS — R06.2 WHEEZING: ICD-10-CM

## 2024-04-08 DIAGNOSIS — J02.9 ACUTE PHARYNGITIS, UNSPECIFIED: ICD-10-CM

## 2024-04-08 DIAGNOSIS — Z98.890 OTHER SPECIFIED POSTPROCEDURAL STATES: Chronic | ICD-10-CM

## 2024-04-08 DIAGNOSIS — R09.81 NASAL CONGESTION: ICD-10-CM

## 2024-04-08 PROCEDURE — 96372 THER/PROPH/DIAG INJ SC/IM: CPT

## 2024-04-08 PROCEDURE — 99284 EMERGENCY DEPT VISIT MOD MDM: CPT

## 2024-04-08 PROCEDURE — 99283 EMERGENCY DEPT VISIT LOW MDM: CPT | Mod: 25

## 2024-04-08 RX ORDER — DEXAMETHASONE 0.5 MG/5ML
10 ELIXIR ORAL ONCE
Refills: 0 | Status: COMPLETED | OUTPATIENT
Start: 2024-04-08 | End: 2024-04-08

## 2024-04-08 RX ORDER — AMOXICILLIN 250 MG/5ML
5 SUSPENSION, RECONSTITUTED, ORAL (ML) ORAL
Qty: 150 | Refills: 0
Start: 2024-04-08 | End: 2024-04-17

## 2024-04-08 RX ADMIN — Medication 10 MILLIGRAM(S): at 09:41

## 2024-04-08 NOTE — ED PROVIDER NOTE - PHYSICAL EXAMINATION
There is right maxillary sinus tenderness.  The right ear shows loss of light reflex consistent with effusion.  Throat As read.  There is no pus.  Tonsils are normal.  Airway is intact.  There is no cervical lymphadenopathy.  Neck is supple.  Chest is clear.  There are no wheezes.  Heart regular rate no murmur.

## 2024-04-08 NOTE — ED PROVIDER NOTE - WET READ LAUNCH FT
There are no Wet Read(s) to document.
No
Mirvaso Counseling: Mirvaso is a topical medication which can decrease superficial blood flow where applied. Side effects are uncommon and include stinging, redness and allergic reactions.

## 2024-04-08 NOTE — ED PROVIDER NOTE - NSFOLLOWUPINSTRUCTIONS_ED_ALL_ED_FT
Take amoxicillin as prescribed.  Take Benadryl 25 mg 4 times a day for the next several days.  This is an over-the-counter medication.  Follow-up with your private doctor next week if your symptoms persist.

## 2024-04-08 NOTE — ED PROVIDER NOTE - PATIENT PORTAL LINK FT
You can access the FollowMyHealth Patient Portal offered by [facility] by registering at the following website: [email or web address]. By joining [employer]’s FollowMyHealth portal, you will also be able to view your health information using other applications (apps) compatible with our system.

## 2024-04-08 NOTE — ED PROVIDER NOTE - OBJECTIVE STATEMENT
Patient complains of sore throat, sinus congestion, wheezing, popping in the ear.  She has a mild cough.  She denies shortness of breath.  Positive clear rhinorrhea/congestion.

## 2024-04-10 ENCOUNTER — EMERGENCY (EMERGENCY)
Facility: HOSPITAL | Age: 44
LOS: 0 days | Discharge: HOME | End: 2024-04-10
Attending: EMERGENCY MEDICINE
Payer: MEDICAID

## 2024-04-10 VITALS
OXYGEN SATURATION: 99 % | HEART RATE: 77 BPM | HEIGHT: 63 IN | WEIGHT: 179.9 LBS | RESPIRATION RATE: 18 BRPM | SYSTOLIC BLOOD PRESSURE: 120 MMHG | DIASTOLIC BLOOD PRESSURE: 68 MMHG | TEMPERATURE: 99 F

## 2024-04-10 DIAGNOSIS — Z98.890 OTHER SPECIFIED POSTPROCEDURAL STATES: Chronic | ICD-10-CM

## 2024-04-10 DIAGNOSIS — J34.89 OTHER SPECIFIED DISORDERS OF NOSE AND NASAL SINUSES: ICD-10-CM

## 2024-04-10 DIAGNOSIS — F31.9 BIPOLAR DISORDER, UNSPECIFIED: ICD-10-CM

## 2024-04-10 DIAGNOSIS — R05.9 COUGH, UNSPECIFIED: ICD-10-CM

## 2024-04-10 DIAGNOSIS — J06.9 ACUTE UPPER RESPIRATORY INFECTION, UNSPECIFIED: ICD-10-CM

## 2024-04-10 DIAGNOSIS — Z33.2 ENCOUNTER FOR ELECTIVE TERMINATION OF PREGNANCY: Chronic | ICD-10-CM

## 2024-04-10 DIAGNOSIS — Z98.891 HISTORY OF UTERINE SCAR FROM PREVIOUS SURGERY: Chronic | ICD-10-CM

## 2024-04-10 DIAGNOSIS — Z88.6 ALLERGY STATUS TO ANALGESIC AGENT: ICD-10-CM

## 2024-04-10 DIAGNOSIS — R09.81 NASAL CONGESTION: ICD-10-CM

## 2024-04-10 DIAGNOSIS — Z20.822 CONTACT WITH AND (SUSPECTED) EXPOSURE TO COVID-19: ICD-10-CM

## 2024-04-10 DIAGNOSIS — F90.9 ATTENTION-DEFICIT HYPERACTIVITY DISORDER, UNSPECIFIED TYPE: ICD-10-CM

## 2024-04-10 PROCEDURE — 71046 X-RAY EXAM CHEST 2 VIEWS: CPT | Mod: 26

## 2024-04-10 PROCEDURE — 71046 X-RAY EXAM CHEST 2 VIEWS: CPT

## 2024-04-10 PROCEDURE — 94640 AIRWAY INHALATION TREATMENT: CPT

## 2024-04-10 PROCEDURE — 99284 EMERGENCY DEPT VISIT MOD MDM: CPT

## 2024-04-10 PROCEDURE — 99283 EMERGENCY DEPT VISIT LOW MDM: CPT | Mod: 25

## 2024-04-10 PROCEDURE — 0241U: CPT

## 2024-04-10 RX ORDER — CEFDINIR 250 MG/5ML
1 POWDER, FOR SUSPENSION ORAL
Qty: 14 | Refills: 0
Start: 2024-04-10 | End: 2024-04-16

## 2024-04-10 RX ORDER — IPRATROPIUM/ALBUTEROL SULFATE 18-103MCG
3 AEROSOL WITH ADAPTER (GRAM) INHALATION ONCE
Refills: 0 | Status: COMPLETED | OUTPATIENT
Start: 2024-04-10 | End: 2024-04-10

## 2024-04-10 RX ORDER — DIPHENHYDRAMINE HYDROCHLORIDE AND LIDOCAINE HYDROCHLORIDE AND ALUMINUM HYDROXIDE AND MAGNESIUM HYDRO
30 KIT ONCE
Refills: 0 | Status: COMPLETED | OUTPATIENT
Start: 2024-04-10 | End: 2024-04-10

## 2024-04-10 RX ORDER — AZITHROMYCIN 500 MG/1
12.5 TABLET, FILM COATED ORAL
Qty: 1 | Refills: 0
Start: 2024-04-10 | End: 2024-04-14

## 2024-04-10 RX ADMIN — DIPHENHYDRAMINE HYDROCHLORIDE AND LIDOCAINE HYDROCHLORIDE AND ALUMINUM HYDROXIDE AND MAGNESIUM HYDRO 30 MILLILITER(S): KIT at 05:45

## 2024-04-10 RX ADMIN — Medication 3 MILLILITER(S): at 05:44

## 2024-04-10 NOTE — ED PROVIDER NOTE - OBJECTIVE STATEMENT
43 year old female past medical history of Bipolar, ADHD, Insomina comes to emergency room for flu symptoms. patient has been sick for the last 5 days with cough, runny nose, congestion, wheezing, throat pain, ear pain. patient seen in emergency room received medication and still not getting better. patient states she cant sleep due to the congestion and cough and has been taking benydrl since onset of symptoms with no effect. patient is currently on abx.

## 2024-04-10 NOTE — ED PROVIDER NOTE - ATTENDING CONTRIBUTION TO CARE
If you are a smoker, it is important for your health to stop smoking. Please be aware that second hand smoke is also harmful.
I reviewed and verified the documentation and  and independently performed the documented  MDM.

## 2024-04-10 NOTE — ED ADULT NURSE NOTE - NS ED NURSE RECORD ANOTHER HT AND WT
Pre-Op 2nd Eye Counseling: The patient has noticed an improvement in their visual symptoms in the operative eye. The patient complains of decreased vision in the fellow eye when ____DRIVING___________________. It was explained to the patient that the decision to proceed with cataract surgery in the fellow eye is entirely a separate decision from the surgical eye. All of the same risks, benefits and alternatives are reviewed with the patient again. The patient does feel the vision in the non-operative eye is limiting their daily activities and elects to proceed with cataract surgery in the ___RIGHT____ eye. . I have given the patient the prescribed regimen of  drops to use before and after cataract surgery. Patient to administer as directed. Yes

## 2024-04-10 NOTE — ED PROVIDER NOTE - PATIENT PORTAL LINK FT
You can access the FollowMyHealth Patient Portal offered by [facility] by registering at the following website: [email or web address]. By joining [employer]’s FollowMyHealth portal, you will also be able to view your health information using other applications (apps) compatible with our system.

## 2024-04-10 NOTE — ED PROVIDER NOTE - MDM ORDERS SUBMITTED SELECTION
32 y.o.  at 36 weeks undergoing IOL for PPROM on pitocin, epidural in place. Cat 1 FHT with intermittent Cat 2.    - continue pitocin  - continuous toco and fetal heart  monitoring    D/w Dr. Alberto [facility]

## 2024-04-10 NOTE — ED PROVIDER NOTE - NSFOLLOWUPINSTRUCTIONS_ED_ALL_ED_FT
Cough, Adult  ImageCoughing is a reflex that clears your throat and your airways. Coughing helps to heal and protect your lungs. It is normal to cough occasionally, but a cough that happens with other symptoms or lasts a long time may be a sign of a condition that needs treatment. A cough may last only 2–3 weeks (acute), or it may last longer than 8 weeks (chronic).    What are the causes?  Coughing is commonly caused by:    Breathing in substances that irritate your lungs.  A viral or bacterial respiratory infection.  Allergies.  Asthma.  Postnasal drip.  Smoking.  Acid backing up from the stomach into the esophagus (gastroesophageal reflux).  Certain medicines.  Chronic lung problems, including COPD (or rarely, lung cancer).  Other medical conditions such as heart failure.    Follow these instructions at home:  Pay attention to any changes in your symptoms. Take these actions to help with your discomfort:    Take medicines only as told by your health care provider.    If you were prescribed an antibiotic medicine, take it as told by your health care provider. Do not stop taking the antibiotic even if you start to feel better.  Talk with your health care provider before you take a cough suppressant medicine.    Drink enough fluid to keep your urine clear or pale yellow.  If the air is dry, use a cold steam vaporizer or humidifier in your bedroom or your home to help loosen secretions.  Avoid anything that causes you to cough at work or at home.  If your cough is worse at night, try sleeping in a semi-upright position.  Avoid cigarette smoke. If you smoke, quit smoking. If you need help quitting, ask your health care provider.  Avoid caffeine.  Avoid alcohol.  Rest as needed.    Contact a health care provider if:  You have new symptoms.  You cough up pus.  Your cough does not get better after 2–3 weeks, or your cough gets worse.  You cannot control your cough with suppressant medicines and you are losing sleep.  You develop pain that is getting worse or pain that is not controlled with pain medicines.  You have a fever.  You have unexplained weight loss.  You have night sweats.  Get help right away if:  You cough up blood.  You have difficulty breathing.  Your heartbeat is very fast.  This information is not intended to replace advice given to you by your health care provider. Make sure you discuss any questions you have with your health care provider.

## 2024-04-10 NOTE — ED PROVIDER NOTE - WHICH SHOWED
independent interpretation,   by myself Dr Arzate, of CXR -  no ptx no opacity not wide  no water bottle heart

## 2024-04-10 NOTE — ED PROVIDER NOTE - CLINICAL SUMMARY MEDICAL DECISION MAKING FREE TEXT BOX
43-year-old female presents with 5 days of cough nasal congestion sore throat myalgias.  Seen in emergency room 2 days ago given Decadron amoxicillin.  Patient taking Benadryl to help sleep, Shortness of breath no chest pain no fever  independent interpretation,   by myself Dr Arzate, of CXR -  no ptx no opacity not wide  no water bottle heart  Symptomatic treatment given,   Patient to be discharged from ED in well appearing condition. Any available test results were discussed with and printed  for patient.  Verbal instructions given, including instructions to return to ED immediately for any new, worsening, or concerning symptoms. Limitations of ED work up discussed.  Patient reports understanding of above with capacity and insight. Written discharge instructions additionally given, including follow-up plan Advised to discontinue Benadryl

## 2024-04-10 NOTE — ED ADULT TRIAGE NOTE - CHIEF COMPLAINT QUOTE
Pt BIBA scot for flu-like systems and chest discomfort; coughing up a lot of phlegm. Pt thinks she took too much Benadryl.

## 2024-04-15 ENCOUNTER — EMERGENCY (EMERGENCY)
Facility: HOSPITAL | Age: 44
LOS: 0 days | Discharge: HOME | End: 2024-04-15
Attending: EMERGENCY MEDICINE
Payer: MEDICAID

## 2024-04-15 VITALS
DIASTOLIC BLOOD PRESSURE: 82 MMHG | RESPIRATION RATE: 18 BRPM | OXYGEN SATURATION: 98 % | HEART RATE: 84 BPM | SYSTOLIC BLOOD PRESSURE: 139 MMHG

## 2024-04-15 VITALS
TEMPERATURE: 98 F | HEART RATE: 88 BPM | WEIGHT: 188.05 LBS | HEIGHT: 63 IN | OXYGEN SATURATION: 97 % | RESPIRATION RATE: 18 BRPM | SYSTOLIC BLOOD PRESSURE: 130 MMHG | DIASTOLIC BLOOD PRESSURE: 76 MMHG

## 2024-04-15 DIAGNOSIS — R06.2 WHEEZING: ICD-10-CM

## 2024-04-15 DIAGNOSIS — Z88.6 ALLERGY STATUS TO ANALGESIC AGENT: ICD-10-CM

## 2024-04-15 DIAGNOSIS — G47.00 INSOMNIA, UNSPECIFIED: ICD-10-CM

## 2024-04-15 DIAGNOSIS — R05.1 ACUTE COUGH: ICD-10-CM

## 2024-04-15 DIAGNOSIS — F31.9 BIPOLAR DISORDER, UNSPECIFIED: ICD-10-CM

## 2024-04-15 DIAGNOSIS — Z20.822 CONTACT WITH AND (SUSPECTED) EXPOSURE TO COVID-19: ICD-10-CM

## 2024-04-15 DIAGNOSIS — R09.81 NASAL CONGESTION: ICD-10-CM

## 2024-04-15 DIAGNOSIS — Z98.890 OTHER SPECIFIED POSTPROCEDURAL STATES: Chronic | ICD-10-CM

## 2024-04-15 DIAGNOSIS — F90.9 ATTENTION-DEFICIT HYPERACTIVITY DISORDER, UNSPECIFIED TYPE: ICD-10-CM

## 2024-04-15 DIAGNOSIS — R91.1 SOLITARY PULMONARY NODULE: ICD-10-CM

## 2024-04-15 DIAGNOSIS — Z98.891 HISTORY OF UTERINE SCAR FROM PREVIOUS SURGERY: Chronic | ICD-10-CM

## 2024-04-15 DIAGNOSIS — F17.200 NICOTINE DEPENDENCE, UNSPECIFIED, UNCOMPLICATED: ICD-10-CM

## 2024-04-15 DIAGNOSIS — Z33.2 ENCOUNTER FOR ELECTIVE TERMINATION OF PREGNANCY: Chronic | ICD-10-CM

## 2024-04-15 LAB
ALBUMIN SERPL ELPH-MCNC: 4.6 G/DL — SIGNIFICANT CHANGE UP (ref 3.5–5.2)
ALP SERPL-CCNC: 70 U/L — SIGNIFICANT CHANGE UP (ref 30–115)
ALT FLD-CCNC: 18 U/L — SIGNIFICANT CHANGE UP (ref 0–41)
ANION GAP SERPL CALC-SCNC: 14 MMOL/L — SIGNIFICANT CHANGE UP (ref 7–14)
AST SERPL-CCNC: 22 U/L — SIGNIFICANT CHANGE UP (ref 0–41)
BASOPHILS # BLD AUTO: 0.08 K/UL — SIGNIFICANT CHANGE UP (ref 0–0.2)
BASOPHILS NFR BLD AUTO: 1.2 % — HIGH (ref 0–1)
BILIRUB SERPL-MCNC: <0.2 MG/DL — SIGNIFICANT CHANGE UP (ref 0.2–1.2)
BUN SERPL-MCNC: 15 MG/DL — SIGNIFICANT CHANGE UP (ref 10–20)
CALCIUM SERPL-MCNC: 10.1 MG/DL — SIGNIFICANT CHANGE UP (ref 8.4–10.5)
CHLORIDE SERPL-SCNC: 98 MMOL/L — SIGNIFICANT CHANGE UP (ref 98–110)
CO2 SERPL-SCNC: 26 MMOL/L — SIGNIFICANT CHANGE UP (ref 17–32)
CREAT SERPL-MCNC: 0.7 MG/DL — SIGNIFICANT CHANGE UP (ref 0.7–1.5)
EGFR: 109 ML/MIN/1.73M2 — SIGNIFICANT CHANGE UP
EOSINOPHIL # BLD AUTO: 0.17 K/UL — SIGNIFICANT CHANGE UP (ref 0–0.7)
EOSINOPHIL NFR BLD AUTO: 2.6 % — SIGNIFICANT CHANGE UP (ref 0–8)
FLUAV AG NPH QL: SIGNIFICANT CHANGE UP
FLUBV AG NPH QL: SIGNIFICANT CHANGE UP
GLUCOSE SERPL-MCNC: 98 MG/DL — SIGNIFICANT CHANGE UP (ref 70–99)
HCG SERPL QL: NEGATIVE — SIGNIFICANT CHANGE UP
HCT VFR BLD CALC: 42.4 % — SIGNIFICANT CHANGE UP (ref 37–47)
HGB BLD-MCNC: 14.6 G/DL — SIGNIFICANT CHANGE UP (ref 12–16)
IMM GRANULOCYTES NFR BLD AUTO: 1.1 % — HIGH (ref 0.1–0.3)
LYMPHOCYTES # BLD AUTO: 1.67 K/UL — SIGNIFICANT CHANGE UP (ref 1.2–3.4)
LYMPHOCYTES # BLD AUTO: 25.9 % — SIGNIFICANT CHANGE UP (ref 20.5–51.1)
MCHC RBC-ENTMCNC: 31.5 PG — HIGH (ref 27–31)
MCHC RBC-ENTMCNC: 34.4 G/DL — SIGNIFICANT CHANGE UP (ref 32–37)
MCV RBC AUTO: 91.6 FL — SIGNIFICANT CHANGE UP (ref 81–99)
MONOCYTES # BLD AUTO: 0.48 K/UL — SIGNIFICANT CHANGE UP (ref 0.1–0.6)
MONOCYTES NFR BLD AUTO: 7.4 % — SIGNIFICANT CHANGE UP (ref 1.7–9.3)
NEUTROPHILS # BLD AUTO: 3.99 K/UL — SIGNIFICANT CHANGE UP (ref 1.4–6.5)
NEUTROPHILS NFR BLD AUTO: 61.8 % — SIGNIFICANT CHANGE UP (ref 42.2–75.2)
NRBC # BLD: 0 /100 WBCS — SIGNIFICANT CHANGE UP (ref 0–0)
PLATELET # BLD AUTO: 352 K/UL — SIGNIFICANT CHANGE UP (ref 130–400)
PMV BLD: 10.6 FL — HIGH (ref 7.4–10.4)
POTASSIUM SERPL-MCNC: 4.6 MMOL/L — SIGNIFICANT CHANGE UP (ref 3.5–5)
POTASSIUM SERPL-SCNC: 4.6 MMOL/L — SIGNIFICANT CHANGE UP (ref 3.5–5)
PROT SERPL-MCNC: 7.9 G/DL — SIGNIFICANT CHANGE UP (ref 6–8)
RBC # BLD: 4.63 M/UL — SIGNIFICANT CHANGE UP (ref 4.2–5.4)
RBC # FLD: 12.2 % — SIGNIFICANT CHANGE UP (ref 11.5–14.5)
RSV RNA NPH QL NAA+NON-PROBE: SIGNIFICANT CHANGE UP
SARS-COV-2 RNA SPEC QL NAA+PROBE: SIGNIFICANT CHANGE UP
SODIUM SERPL-SCNC: 138 MMOL/L — SIGNIFICANT CHANGE UP (ref 135–146)
WBC # BLD: 6.46 K/UL — SIGNIFICANT CHANGE UP (ref 4.8–10.8)
WBC # FLD AUTO: 6.46 K/UL — SIGNIFICANT CHANGE UP (ref 4.8–10.8)

## 2024-04-15 PROCEDURE — 94640 AIRWAY INHALATION TREATMENT: CPT

## 2024-04-15 PROCEDURE — 71046 X-RAY EXAM CHEST 2 VIEWS: CPT

## 2024-04-15 PROCEDURE — 99285 EMERGENCY DEPT VISIT HI MDM: CPT

## 2024-04-15 PROCEDURE — 71046 X-RAY EXAM CHEST 2 VIEWS: CPT | Mod: 26

## 2024-04-15 PROCEDURE — 85025 COMPLETE CBC W/AUTO DIFF WBC: CPT

## 2024-04-15 PROCEDURE — 36415 COLL VENOUS BLD VENIPUNCTURE: CPT

## 2024-04-15 PROCEDURE — 99284 EMERGENCY DEPT VISIT MOD MDM: CPT | Mod: 25

## 2024-04-15 PROCEDURE — 0241U: CPT

## 2024-04-15 PROCEDURE — 84703 CHORIONIC GONADOTROPIN ASSAY: CPT

## 2024-04-15 PROCEDURE — 71250 CT THORAX DX C-: CPT | Mod: 26,MC

## 2024-04-15 PROCEDURE — 71250 CT THORAX DX C-: CPT | Mod: MC

## 2024-04-15 PROCEDURE — 96372 THER/PROPH/DIAG INJ SC/IM: CPT

## 2024-04-15 PROCEDURE — 80053 COMPREHEN METABOLIC PANEL: CPT

## 2024-04-15 RX ORDER — IPRATROPIUM/ALBUTEROL SULFATE 18-103MCG
3 AEROSOL WITH ADAPTER (GRAM) INHALATION ONCE
Refills: 0 | Status: COMPLETED | OUTPATIENT
Start: 2024-04-15 | End: 2024-04-15

## 2024-04-15 RX ORDER — DEXAMETHASONE 0.5 MG/5ML
10 ELIXIR ORAL ONCE
Refills: 0 | Status: COMPLETED | OUTPATIENT
Start: 2024-04-15 | End: 2024-04-15

## 2024-04-15 RX ORDER — GUAIFENESIN/DEXTROMETHORPHAN 600MG-30MG
20 TABLET, EXTENDED RELEASE 12 HR ORAL ONCE
Refills: 0 | Status: COMPLETED | OUTPATIENT
Start: 2024-04-15 | End: 2024-04-15

## 2024-04-15 RX ADMIN — Medication 10 MILLIGRAM(S): at 04:36

## 2024-04-15 RX ADMIN — Medication 3 MILLILITER(S): at 04:39

## 2024-04-15 RX ADMIN — Medication 20 MILLILITER(S): at 05:11

## 2024-04-15 NOTE — ED PROVIDER NOTE - IV ALTEPLASE EXCL ABS HIDDEN
If symptoms do not improve within 2-3 days of antibiotic therapy return to clinic. If you begin to have difficulty swallowing, pain more so on one side of throat, muffled voice or drooling go to ER immediately. Complete full course of antibiotic therapy.      cepacol  
show

## 2024-04-15 NOTE — ED PROVIDER NOTE - ATTENDING APP SHARED VISIT CONTRIBUTION OF CARE
43-year-old female, history of ADHD, bipolar disorder, presents with cough and congestion for 2 weeks.  Was called back and told to have pneumonia on x-ray, took 3 antibiotics and is still not better.  Admits to vaping daily.  Exam shows alert patient in no distress, HEENT NCAT PERRL, neck supple, lungs clear, RR S1S2, abdomen soft NT +BS, no CCE.

## 2024-04-15 NOTE — ED PROVIDER NOTE - CARE PROVIDER_API CALL
Toño Wolff E  Pulmonary Disease  [address] [place] [id number]  Phone: [phone]  Fax: [phone]  Follow Up Time: 7-10 Days

## 2024-04-15 NOTE — ED PROVIDER NOTE - CLINICAL SUMMARY MEDICAL DECISION MAKING FREE TEXT BOX
43-year-old female, history of ADHD, bipolar disorder, presents with persistent cough and congestion for the past 2 weeks.  Was called back and told to have a pneumonia but did not improve with antibiotics.  Labs unremarkable.  Nasal swab negative.  CT chest no lung mass or pneumonia, right lower lobe nodule, recommends follow-up CT as an outpatient.  Will DC and refer to pulmonary medicine.

## 2024-04-15 NOTE — ED PROVIDER NOTE - CARE PLAN
Interval History: No acute issues overnight after transition to the floor.      Telemetry reviewed without concern.     Objective:     Vital Signs (Most Recent):  Temp: 98.2 °F (36.8 °C) (03/14/23 0800)  Pulse: (!) 120 (03/14/23 0800)  Resp: 16 (03/14/23 0800)  BP: (!) 92/53 (03/14/23 0800)  SpO2: 99 % (03/14/23 0800)   Vital Signs (24h Range):  Temp:  [97.7 °F (36.5 °C)-98.5 °F (36.9 °C)] 98.2 °F (36.8 °C)  Pulse:  [119-223] 120  Resp:  [13-29] 16  SpO2:  [90 %-100 %] 99 %  BP: ()/(51-79) 92/53     Weight: 56.4 kg (124 lb 5.4 oz)  Body mass index is 18.91 kg/m².     SpO2: 99 %       Intake/Output - Last 3 Shifts         03/12 0700  03/13 0659 03/13 0700  03/14 0659 03/14 0700  03/15 0659    P.O. 2206 1957     I.V. (mL/kg) 61.6 (1.1) 41.9 (0.7)     Other 0.5 0.5     IV Piggyback 75.8      Total Intake(mL/kg) 2343.8 (40) 1999.4 (35.1)     Urine (mL/kg/hr) 2875 (2) 2200 (1.6)     Stool 0 0     Total Output 2875 2200     Net -531.2 -200.6            Urine Occurrence 1 x 3 x     Stool Occurrence 4 x 3 x             Lines/Drains/Airways       Peripheral Intravenous Line  Duration                  Peripheral IV - Single Lumen 03/02/23 0934 20 G Posterior;Left Hand 12 days                    Scheduled Medications:    aspirin  81 mg Oral Daily    dronabinoL  5 mg Oral BID    DULoxetine  60 mg Oral Daily    melatonin  9 mg Oral Nightly    mycophenolate  1,000 mg Oral BID    nystatin  500,000 Units Oral QID    pantoprazole  40 mg Oral Daily    penicillin v potassium  500 mg Oral Q12H    pentamidine  300 mg Inhalation Q30 Days    pravastatin  20 mg Oral QHS    predniSONE  20 mg Oral Daily    senna-docusate 8.6-50 mg  1 tablet Oral BID    sirolimus  1 mg Oral Daily    spironolactone  50 mg Oral BID    tacrolimus  2.5 mg Oral BID    tadalafil  20 mg Oral Daily    torsemide  60 mg Oral BID loop    valGANciclovir  450 mg Oral Daily       Continuous Medications:    insulin aspart U-100 1.5 Units/hr (03/13/23 1800)       PRN  Medications: sodium chloride, acetaminophen, albumin human 5%, dextrose 10%, dextrose 10%, dextrose, dextrose, diphenhydrAMINE, glucagon (human recombinant), insulin aspart U-100, methocarbamoL, oxyCODONE, polyethylene glycol    Physical Exam  Constitutional:       General: He is not in acute distress.     Appearance: He appears well.   HENT:      Head: Normocephalic.      Comments: No edema     Nose: Nose normal.      Eyes: R eye stye  Cardiovascular:      Rate and Rhythm: Tachycardia present.      Pulses:           Radial and pedal pulses are 2+ on the right side.      Heart sounds: Murmur heard. There is a 3/6 systolic murmur.     No gallop present.      Comments: +JVD  Pulmonary:      Effort: Pulmonary effort is normal. No respiratory distress.      Breath sounds: No stridor. No wheezing, rhonchi or rales. Good air entry bilaterally.   Chest:      Comments: Sternotomy incision well healed.  Abdominal:      General: There is mild distension.      Palpations: There is no mass.      Tenderness: There is no abdominal tenderness. There is no guarding.      Hernia: No hernia is present.      Comments: Liver edge appreciated 1-2 cm below the RCM   Musculoskeletal:      Right lower leg: No edema.      Left lower leg: No edema.   Skin:     General: Skin is warm.      Capillary Refill: Capillary refill takes less than 2 seconds.   Neurological:      Mental Status: He is alert.    Significant Labs:       Recent Labs   Lab 03/14/23  0732   WBC 1.62*   RBC 3.53*   HGB 6.6*   HCT 23.3*   *   MCV 66*   MCH 18.7*   MCHC 28.3*         BMP  Lab Results   Component Value Date     03/14/2023    K 3.2 (L) 03/14/2023     03/14/2023    CO2 30 (H) 03/14/2023    BUN 78 (H) 03/14/2023    CREATININE 1.4 03/14/2023    CALCIUM 9.4 03/14/2023    ANIONGAP 10 03/14/2023    ESTGFRAFRICA SEE COMMENT 07/26/2022    EGFRNONAA SEE COMMENT 07/26/2022       Lab Results   Component Value Date    ALT 30 03/14/2023    AST 62 (H)  03/14/2023     (H) 09/21/2020    ALKPHOS 142 03/14/2023    BILITOT 0.3 03/14/2023       Microbiology Results (last 7 days)       ** No results found for the last 168 hours. **             Significant Imaging:     Echocardiogram (3/9):  Poor coaptation of the tricuspid valve with severe insufficiency.   Low TR gradient, sugggestive of normal RV pressure.   Mild mitral valve insufficiency.   Mild RV dilation. Moderately decreased right ventricular systolic function.   Normal left ventricle structure and size. Septal dyskinesis with good movement of the LV free wall, SF = 29% and biplane EF 52-55%. Decreased LV strain of -12.   No pericardial effusion   No significant change from previous.    Principal Discharge DX:	Cough  Secondary Diagnosis:	Lung nodule   1

## 2024-04-15 NOTE — ED PROVIDER NOTE - PROGRESS NOTE DETAILS
FF: I discussed lab and radiology results with pt. pt made aware of lung nodule. pt advised of strict return precautions discussed at bedside. agreeable to dc. f/u with pulm

## 2024-04-15 NOTE — ED ADULT NURSE NOTE - NSFALLUNIVINTERV_ED_ALL_ED
Bed/Stretcher in lowest position, wheels locked, appropriate side rails in place/Call bell, personal items and telephone in reach/Instruct patient to call for assistance before getting out of bed/chair/stretcher/Non-slip footwear applied when patient is off stretcher/Fate to call system/Physically safe environment - no spills, clutter or unnecessary equipment/Purposeful proactive rounding/Room/bathroom lighting operational, light cord in reach

## 2024-04-15 NOTE — ED PROVIDER NOTE - PATIENT PORTAL LINK FT
You can access the FollowMyHealth Patient Portal offered by  by registering at the following website: [email or web address]. By joining [employer]’s FollowMyHealth portal, you will also be able to view your health information using other applications (apps) compatible with our system.

## 2024-04-15 NOTE — ED PROVIDER NOTE - OBJECTIVE STATEMENT
43-year-old female with a past medical history of ADHD, bipolar disorder, and insomnia presents to the ED for evaluation of cough, and runny nose x 2 weeks.  Patient was seen in the ED on April 8 for sore throat, sinus congestion, wheezing, and coughing.  Patient was discharged on amoxicillin.  Reports she received a call back from the hospital on April 10 was told she had left lower lobe pneumonia and was prescribed azithromycin and cefdinir which she completed.  Patient reports she is still having cough and runny nose.  Patient denies fever, chills, headaches, dizziness, pain, nausea, vomiting, diarrhea, constipation, urinary symptoms back pain, sob, recent travel, or recent sick contacts.  Patient reports she vapes daily.

## 2024-04-15 NOTE — ED PROVIDER NOTE - NSPTACCESSSVCSAPPTDETAILS_ED_ALL_ED_FT
pt has had cough for 2 weeks. completed amox, zpak and cefdinir. pt had ct chest noncon. pt does not have pneumonia, pleural effusion or pneumothorax. pt has a lung nodule. pt vapes. former smoker.

## 2024-04-15 NOTE — ED PROVIDER NOTE - NSFOLLOWUPINSTRUCTIONS_ED_ALL_ED_FT
Our Emergency Department Referral Coordinators will be reaching out to you in the next 24-48 hours from 9:00am to 5:00pm to schedule a follow up appointment. Please expect a phone call from the hospital in that time frame. If you do not receive a call or if you have any questions or concerns, you can reach them at   [phone].      Cough    Coughing is a reflex that clears your throat and your airways. Coughing helps to heal and protect your lungs. It is normal to cough occasionally, but a cough that happens with other symptoms or lasts a long time may be a sign of a condition that needs treatment. Coughing may be caused by infections, asthma or COPD, smoking, postnasal drip, gastroesophageal reflux, as well as other medical conditions. Take medicines only as instructed by your health care provider. Avoid environments or triggers that causes you to cough at work or at home.    SEEK IMMEDIATE MEDICAL CARE IF YOU HAVE ANY OF THE FOLLOWING SYMPTOMS: coughing up blood, shortness of breath, rapid heart rate, chest pain, unexplained weight loss or night sweats.    Follow up with your primary care physician. If a lung nodule is new or has changed in size, shape or appearance, your doctor may recommend further testing — such as a CT scan, positron emission tomography (PET) scan, bronchoscopy or tissue biopsy — to determine if it's cancerous.

## 2024-04-23 NOTE — ED ADULT TRIAGE NOTE - NS_BH TRG QUESTION7_ED_ALL_ED
Upper Endoscopy and Colonoscopy   WHAT YOU NEED TO KNOW:   An upper endoscopy is also called an upper gastrointestinal (GI) endoscopy, or an esophagogastroduodenoscopy (EGD). It is a procedure to examine the inside of your esophagus, stomach, and duodenum (first part of the small intestine) with a scope. You may feel bloated, gassy, or have some abdominal discomfort after your procedure. Your throat may be sore for 24 to 36 hours. You may burp or pass gas from air that is still inside your body.                A colonoscopy is a procedure to examine the inside of your colon (intestine) with a scope. Polyps or tissue growths may have been removed during your colonoscopy. It is normal to feel bloated and to have some abdominal discomfort. You should be passing gas. If you have hemorrhoids or you had polyps removed, you may have a small amount of bleeding.          DISCHARGE INSTRUCTIONS:   Seek care immediately if:   You have sudden, severe abdominal pain.     You have problems swallowing.     You have a large amount of black, sticky bowel movements or blood in your bowel movements.     You have sudden trouble breathing.     You feel weak, lightheaded, or faint or your heart beats faster than normal for you.     Contact your healthcare provider if:   You have a fever and chills.      You have nausea or are vomiting.      Your abdomen is bloated or feels full and hard.     You have abdominal pain.    You have a large amount of black, sticky bowel movements or blood in your bowel movements.    You have not had a bowel movement for 3 days after your procedure.    You have rash or hives.    You have questions or concerns about your procedure.     Activity:   ·       Do not lift, strain, or run for 24 hours after your procedure.     ·       Rest after your procedure. You have been given medicine to relax you. Do not drive or make important decisions until the day after your procedure. Return to your normal activity as  directed.     ·       Relieve gas and discomfort from bloating by lying on your right side with a heating pad on your abdomen. You may need to take short walks to help the gas move out. Eat small meals until bloating is relieved.  Follow up with your healthcare provider as directed: Write down your questions so you remember to ask them during your visits.      If you take a “blood thinner”, please review the specific instructions from your endoscopist about when you should resume it. These can be found in the “Recommendation” and “Your Medication list” sections of this After Visit Summary.      No

## 2024-06-06 NOTE — ED ADULT NURSE NOTE - CHIEF COMPLAINT
OVERNIGHT EVENTS:    SUBJECTIVE / INTERVAL HPI: Patient seen and examined at bedside.     VITAL SIGNS:  Vital Signs Last 24 Hrs  T(C): 36.2 (06 Jun 2024 09:28), Max: 36.4 (05 Jun 2024 14:37)  T(F): 97.2 (06 Jun 2024 09:28), Max: 97.5 (05 Jun 2024 14:37)  HR: 66 (06 Jun 2024 10:35) (66 - 90)  BP: 97/50 (06 Jun 2024 10:35) (93/50 - 120/73)  BP(mean): 70 (06 Jun 2024 10:35) (68 - 90)  RR: 18 (06 Jun 2024 10:35) (16 - 20)  SpO2: 100% (06 Jun 2024 10:35) (98% - 100%)    Parameters below as of 06 Jun 2024 10:35  Patient On (Oxygen Delivery Method): nasal cannula  O2 Flow (L/min): 2      PHYSICAL EXAM:    General: alert, in no acute distress  HEENT: NC/AT; PERRL, anicteric sclera; MMM  Neck: supple  Cardiovascular: +S1/S2, RRR  Respiratory: CTA B/L; no W/R/R  Gastrointestinal: soft, NT/ND; +BSx4  Extremities: WWP; no edema, clubbing or cyanosis  Vascular: 2+ radial, DP/PT pulses B/L  Neurological: AAOx3; no focal deficits    MEDICATIONS:  MEDICATIONS  (STANDING):  aspirin  chewable 81 milliGRAM(s) Oral daily  atorvastatin 40 milliGRAM(s) Oral at bedtime  cefTRIAXone Injectable. 1000 milliGRAM(s) IV Push every 24 hours  heparin   Injectable 5000 Unit(s) SubCutaneous every 8 hours  lidocaine   4% Patch 1 Patch Transdermal every 24 hours  pantoprazole    Tablet 40 milliGRAM(s) Oral every 12 hours  sodium zirconium cyclosilicate 10 Gram(s) Oral every 8 hours  thiamine 100 milliGRAM(s) Oral every 24 hours    MEDICATIONS  (PRN):  HYDROmorphone  Injectable 0.5 milliGRAM(s) IV Push every 4 hours PRN Severe Pain (7 - 10)  HYDROmorphone  Injectable 0.2 milliGRAM(s) IV Push every 4 hours PRN Moderate Pain (4 - 6)      ALLERGIES:  Allergies    Aldactone (Rash)    Intolerances        LABS:                        6.9    4.89  )-----------( 243      ( 06 Jun 2024 05:30 )             20.8     06-06    129<L>  |  96  |  85<H>  ----------------------------<  76  5.0   |  21<L>  |  3.06<H>    Ca    8.8      06 Jun 2024 05:30  Phos  4.9     06-06  Mg     2.0     06-06    TPro  5.8<L>  /  Alb  3.0<L>  /  TBili  0.4  /  DBili  x   /  AST  18  /  ALT  23  /  AlkPhos  82  06-06    PT/INR - ( 05 Jun 2024 04:57 )   PT: 13.3 sec;   INR: 1.17          PTT - ( 05 Jun 2024 04:57 )  PTT:33.6 sec  Urinalysis Basic - ( 06 Jun 2024 05:30 )    Color: x / Appearance: x / SG: x / pH: x  Gluc: 76 mg/dL / Ketone: x  / Bili: x / Urobili: x   Blood: x / Protein: x / Nitrite: x   Leuk Esterase: x / RBC: x / WBC x   Sq Epi: x / Non Sq Epi: x / Bacteria: x      CAPILLARY BLOOD GLUCOSE      POCT Blood Glucose.: 97 mg/dL (06 Jun 2024 12:07)      RADIOLOGY & ADDITIONAL TESTS: Reviewed. ********** Stepdown From [facility] To [facility] **********    OVERNIGHT EVENTS:    SUBJECTIVE / INTERVAL HPI: Patient seen and examined at bedside.     VITAL SIGNS:  Vital Signs Last 24 Hrs  T(C): 36.2 (06 Jun 2024 09:28), Max: 36.4 (05 Jun 2024 14:37)  T(F): 97.2 (06 Jun 2024 09:28), Max: 97.5 (05 Jun 2024 14:37)  HR: 66 (06 Jun 2024 10:35) (66 - 90)  BP: 97/50 (06 Jun 2024 10:35) (93/50 - 120/73)  BP(mean): 70 (06 Jun 2024 10:35) (68 - 90)  RR: 18 (06 Jun 2024 10:35) (16 - 20)  SpO2: 100% (06 Jun 2024 10:35) (98% - 100%)    Parameters below as of 06 Jun 2024 10:35  Patient On (Oxygen Delivery Method): nasal cannula  O2 Flow (L/min): 2      PHYSICAL EXAM:    General: alert, in no acute distress  HEENT: NC/AT; PERRL, anicteric sclera; MMM  Neck: supple  Cardiovascular: +S1/S2, RRR  Respiratory: CTA B/L; no W/R/R  Gastrointestinal: soft, NT/ND; +BSx4  Extremities: WWP; no edema, clubbing or cyanosis  Vascular: 2+ radial, DP/PT pulses B/L  Neurological: AAOx3; no focal deficits    MEDICATIONS:  MEDICATIONS  (STANDING):  aspirin  chewable 81 milliGRAM(s) Oral daily  atorvastatin 40 milliGRAM(s) Oral at bedtime  cefTRIAXone Injectable. 1000 milliGRAM(s) IV Push every 24 hours  heparin   Injectable 5000 Unit(s) SubCutaneous every 8 hours  lidocaine   4% Patch 1 Patch Transdermal every 24 hours  pantoprazole    Tablet 40 milliGRAM(s) Oral every 12 hours  sodium zirconium cyclosilicate 10 Gram(s) Oral every 8 hours  thiamine 100 milliGRAM(s) Oral every 24 hours    MEDICATIONS  (PRN):  HYDROmorphone  Injectable 0.5 milliGRAM(s) IV Push every 4 hours PRN Severe Pain (7 - 10)  HYDROmorphone  Injectable 0.2 milliGRAM(s) IV Push every 4 hours PRN Moderate Pain (4 - 6)      ALLERGIES:  Allergies    Aldactone (Rash)    Intolerances        LABS:                        6.9    4.89  )-----------( 243      ( 06 Jun 2024 05:30 )             20.8     06-06    129<L>  |  96  |  85<H>  ----------------------------<  76  5.0   |  21<L>  |  3.06<H>    Ca    8.8      06 Jun 2024 05:30  Phos  4.9     06-06  Mg     2.0     06-06    TPro  5.8<L>  /  Alb  3.0<L>  /  TBili  0.4  /  DBili  x   /  AST  18  /  ALT  23  /  AlkPhos  82  06-06    PT/INR - ( 05 Jun 2024 04:57 )   PT: 13.3 sec;   INR: 1.17          PTT - ( 05 Jun 2024 04:57 )  PTT:33.6 sec  Urinalysis Basic - ( 06 Jun 2024 05:30 )    Color: x / Appearance: x / SG: x / pH: x  Gluc: 76 mg/dL / Ketone: x  / Bili: x / Urobili: x   Blood: x / Protein: x / Nitrite: x   Leuk Esterase: x / RBC: x / WBC x   Sq Epi: x / Non Sq Epi: x / Bacteria: x      CAPILLARY BLOOD GLUCOSE      POCT Blood Glucose.: 97 mg/dL (06 Jun 2024 12:07)      RADIOLOGY & ADDITIONAL TESTS: Reviewed. ********** Stepdown From [facility] To [facility] **********  Hospital Course :   89-year-old female history of chronic lower back pain c/b left foot drop (2/2 herniated disc repair sx in 2010), now with spinal cord stimulator, hypertension, breast ca (2014, s/p R breast lumpectomy + radiation but no chemo), Afib (not on AC), HFrEF (baseline EF is 15%) CKD (baseline cr 1.3-1.5), chronic anemia (baseline hgb 8), obstructive uropathy with recently placed mackenzie 2 weeks ago at [facility] and several recent hospitalizations at [facility] in May for hyponatremia, UTI (  pan sensitive E Coli treated with 5 day course of likely CTX) and urinary retention who presents today with worsening back pain, lethargy, confusion, nausea, and diarrhea, found to have uremia and acute on chronic renal failure, worsening HF and hyperkalemia.     Patient at baseline is reportedly AOx4 and was ambulating with a walker until the beginning of May per Daughter. Daughter reports pt has been declining since then. . Patient had Mackenzie placed for obstructive uropathy at 2nd [facility] hospitalization and was treated for UTI. She was discharged on 5/28 with Cr of 1.5, K of 5.2, Na of 132 and Hgb of 8. Since then, per daughter, pt has been utterly bedbound. On admission, K 6.9,  the patient received Calcium gluconate, bicarb, albuterol, insulin, D5 and Lokelma for hyperkalemia. K now is at 5 The patient is still in Lokelma TID. EKG without concerns. Cr 2.68 with baseline 1.5 renal was consulted, recommended diuresis with Lasix to help unload the heart as well as the hyponatremia (now improving). Pro BNP >88332. TTE (2024 [facility]): LV EF 15% with paradoxical septal motion with severe hypokinesis, dilated RV with reduced function, severe LA dilation, severe MR, moderate to severe TR, moderate PH. Cardiology were consulted, the patient recently stopped home Entresto for the concern of renal failure in [facility]. She is also on Metoprolol 50mg which was held per card for maintaining adequate HR iso reduced . They also recommended continuing diuresis. Inotropic support and HD though might be indicated at some point, the patient is deemed not a candidate considering her advanced age. She received a unit of pRBC this morning for Hb of 6.9 to improve her overall cardiovascular status.     As per her severe back pain, the technician responsible for the spinal stimulator came yesterday to adjust the device. The patient pain now has improved. She was put on pain control regimen with Dilaudid as well. She is stable to transfer to [facility].         OVERNIGHT EVENTS: Hb 6.9, received 1 unit pRBC , K improving     SUBJECTIVE / INTERVAL HPI: Patient seen and examined at bedside.     VITAL SIGNS:  Vital Signs Last 24 Hrs  T(C): 36.2 (06 Jun 2024 09:28), Max: 36.4 (05 Jun 2024 14:37)  T(F): 97.2 (06 Jun 2024 09:28), Max: 97.5 (05 Jun 2024 14:37)  HR: 66 (06 Jun 2024 10:35) (66 - 90)  BP: 97/50 (06 Jun 2024 10:35) (93/50 - 120/73)  BP(mean): 70 (06 Jun 2024 10:35) (68 - 90)  RR: 18 (06 Jun 2024 10:35) (16 - 20)  SpO2: 100% (06 Jun 2024 10:35) (98% - 100%)    Parameters below as of 06 Jun 2024 10:35  Patient On (Oxygen Delivery Method): nasal cannula  O2 Flow (L/min): 2      PHYSICAL EXAM:    Constitutional: appears in distress, eyes squeezed shut, laying in bed  Neurological: AAOx1, CN grossly intact, , reduced right UE and LE strength  HEENT: PERRL, EOMI, sclera non-icteric, neck supple, +JVD, MMM,  Respiratory: CTA b/l, good air entry b/l, no wheezing, no crackles/rales, without accessory muscle use and no intercostal retractions  Cardiovascular: irregular rhythm, normal S1S2, no murmurs or rubs   Gastrointestinal: soft, tender to deep palpation, non distended, neg hepatojugular reflex, no masses or overt organomegaly palpable, BS normal  MSK: Tenderness over the lumbar spine  Extremities: cool LE, dorsalis pedis pulses not palpable, feet without ulcers however hyperkeratosis present, no LE edema, muscle atrophy of distal right LE, LUE edematous     MEDICATIONS:  MEDICATIONS  (STANDING):  aspirin  chewable 81 milliGRAM(s) Oral daily  atorvastatin 40 milliGRAM(s) Oral at bedtime  cefTRIAXone Injectable. 1000 milliGRAM(s) IV Push every 24 hours  heparin   Injectable 5000 Unit(s) SubCutaneous every 8 hours  lidocaine   4% Patch 1 Patch Transdermal every 24 hours  pantoprazole    Tablet 40 milliGRAM(s) Oral every 12 hours  sodium zirconium cyclosilicate 10 Gram(s) Oral every 8 hours  thiamine 100 milliGRAM(s) Oral every 24 hours    MEDICATIONS  (PRN):  HYDROmorphone  Injectable 0.5 milliGRAM(s) IV Push every 4 hours PRN Severe Pain (7 - 10)  HYDROmorphone  Injectable 0.2 milliGRAM(s) IV Push every 4 hours PRN Moderate Pain (4 - 6)      ALLERGIES:  Allergies    Aldactone (Rash)    Intolerances        LABS:                        6.9    4.89  )-----------( 243      ( 06 Jun 2024 05:30 )             20.8     06-06    129<L>  |  96  |  85<H>  ----------------------------<  76  5.0   |  21<L>  |  3.06<H>    Ca    8.8      06 Jun 2024 05:30  Phos  4.9     06-06  Mg     2.0     06-06    TPro  5.8<L>  /  Alb  3.0<L>  /  TBili  0.4  /  DBili  x   /  AST  18  /  ALT  23  /  AlkPhos  82  06-06    PT/INR - ( 05 Jun 2024 04:57 )   PT: 13.3 sec;   INR: 1.17          PTT - ( 05 Jun 2024 04:57 )  PTT:33.6 sec  Urinalysis Basic - ( 06 Jun 2024 05:30 )    Color: x / Appearance: x / SG: x / pH: x  Gluc: 76 mg/dL / Ketone: x  / Bili: x / Urobili: x   Blood: x / Protein: x / Nitrite: x   Leuk Esterase: x / RBC: x / WBC x   Sq Epi: x / Non Sq Epi: x / Bacteria: x      CAPILLARY BLOOD GLUCOSE      POCT Blood Glucose.: 97 mg/dL (06 Jun 2024 12:07)      RADIOLOGY & ADDITIONAL TESTS: Reviewed. The patient is a 39y Female complaining of burns.

## 2024-06-24 ENCOUNTER — EMERGENCY (EMERGENCY)
Facility: HOSPITAL | Age: 44
LOS: 0 days | Discharge: HOME | End: 2024-06-24
Attending: EMERGENCY MEDICINE
Payer: MEDICAID

## 2024-06-24 VITALS
SYSTOLIC BLOOD PRESSURE: 144 MMHG | HEIGHT: 63 IN | HEART RATE: 68 BPM | DIASTOLIC BLOOD PRESSURE: 102 MMHG | OXYGEN SATURATION: 99 % | WEIGHT: 171.96 LBS | RESPIRATION RATE: 18 BRPM | TEMPERATURE: 98 F

## 2024-06-24 DIAGNOSIS — F31.9 BIPOLAR DISORDER, UNSPECIFIED: ICD-10-CM

## 2024-06-24 DIAGNOSIS — Z98.891 HISTORY OF UTERINE SCAR FROM PREVIOUS SURGERY: Chronic | ICD-10-CM

## 2024-06-24 DIAGNOSIS — Z01.89 ENCOUNTER FOR OTHER SPECIFIED SPECIAL EXAMINATIONS: ICD-10-CM

## 2024-06-24 DIAGNOSIS — Z33.2 ENCOUNTER FOR ELECTIVE TERMINATION OF PREGNANCY: Chronic | ICD-10-CM

## 2024-06-24 DIAGNOSIS — Z98.890 OTHER SPECIFIED POSTPROCEDURAL STATES: Chronic | ICD-10-CM

## 2024-06-24 DIAGNOSIS — Z88.6 ALLERGY STATUS TO ANALGESIC AGENT: ICD-10-CM

## 2024-06-24 DIAGNOSIS — F90.9 ATTENTION-DEFICIT HYPERACTIVITY DISORDER, UNSPECIFIED TYPE: ICD-10-CM

## 2024-06-24 PROCEDURE — 99282 EMERGENCY DEPT VISIT SF MDM: CPT

## 2024-06-24 PROCEDURE — 99283 EMERGENCY DEPT VISIT LOW MDM: CPT

## 2024-06-24 NOTE — ED PROVIDER NOTE - PATIENT PORTAL LINK FT
You can access the FollowMyHealth Patient Portal offered by [facility] by registering at the following website: [email or web address]. By joining [employer]’s FollowMyHealth portal, you will also be able to view your health information using other applications (apps) compatible with our system.

## 2024-06-24 NOTE — ED PROVIDER NOTE - PHYSICAL EXAMINATION
CONSTITUTIONAL: Well-appearing; in no apparent distress.   ENT: External ears are non-tender and without swelling or erythema. Ear canals are clear without discharge bilaterally or tenderness to palpation; no foreign body noticed. The tympanic membranes are normal in appearance. Hearing is intact with good acuity to spoken voice.  Patient is speaking clearly, not muffled and airway is intact.  RESPIRATORY: No signs of respiratory distress.  CARDIOVASCULAR: Regular rate and rhythm.   NEURO: A & O x 3. Normal speech. No focal deficit.  PSYCHOLOGICAL: Appropriate mood and affect. Good judgement and insight.

## 2024-06-24 NOTE — ED ADULT NURSE NOTE - NSFALLUNIVINTERV_ED_ALL_ED
Bed/Stretcher in lowest position, wheels locked, appropriate side rails in place/Call bell, personal items and telephone in reach/Instruct patient to call for assistance before getting out of bed/chair/stretcher/Non-slip footwear applied when patient is off stretcher/[place] to call system/Physically safe environment - no spills, clutter or unnecessary equipment/Purposeful proactive rounding/Room/bathroom lighting operational, light cord in reach

## 2024-06-24 NOTE — ED PROVIDER NOTE - OBJECTIVE STATEMENT
43-year-old female with past med history of ADHD and bipolar who presents to the ED for evaluation of possible foreign body in the left ear.  Reports that she was using the Q-tip and thinks that the cotton got stuck in her ear on the left side, so came to the ED for evaluation.  Denies any other symptoms otherwise.

## 2024-06-24 NOTE — ED PROVIDER NOTE - CLINICAL SUMMARY MEDICAL DECISION MAKING FREE TEXT BOX
Patient here concerned that there is a Q-tip in her left ear.  On exam there is no cotton from the Q-tip in left ear rather the Q-tip was likely dysfunctional did not contain any cotton on that end

## 2024-06-24 NOTE — ED ADULT NURSE NOTE - FINAL NURSING ELECTRONIC SIGNATURE
Recommend soaking it in warm soapy water 2 times a day and continue the antibiotic ointment 2 times a day.  If it is worsening, any fever, decrease appetite, increase redness, swelling, streaking up the arm from the site, he should be seen in office.  Would also be happy to see him tomorrow in clinic if mom is more comfortable with that.  Otherwise can monitor over the weekend if not getting worse or getting better.    24-Jun-2024 13:22

## 2024-07-28 ENCOUNTER — EMERGENCY (EMERGENCY)
Facility: HOSPITAL | Age: 44
LOS: 0 days | Discharge: HOME | End: 2024-07-28
Attending: EMERGENCY MEDICINE
Payer: MEDICAID

## 2024-07-28 VITALS
DIASTOLIC BLOOD PRESSURE: 90 MMHG | TEMPERATURE: 98 F | HEART RATE: 94 BPM | RESPIRATION RATE: 20 BRPM | HEIGHT: 63 IN | SYSTOLIC BLOOD PRESSURE: 136 MMHG | WEIGHT: 179.9 LBS | OXYGEN SATURATION: 99 %

## 2024-07-28 DIAGNOSIS — M54.2 CERVICALGIA: ICD-10-CM

## 2024-07-28 DIAGNOSIS — Z98.890 OTHER SPECIFIED POSTPROCEDURAL STATES: Chronic | ICD-10-CM
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
## 2024-07-28 DIAGNOSIS — F90.9 ATTENTION-DEFICIT HYPERACTIVITY DISORDER, UNSPECIFIED TYPE: ICD-10-CM
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
## 2024-07-28 DIAGNOSIS — Z33.2 ENCOUNTER FOR ELECTIVE TERMINATION OF PREGNANCY: Chronic | ICD-10-CM

## 2024-07-28 DIAGNOSIS — S16.1XXA STRAIN OF MUSCLE, FASCIA AND TENDON AT NECK LEVEL, INITIAL ENCOUNTER: ICD-10-CM

## 2024-07-28 DIAGNOSIS — Z88.6 ALLERGY STATUS TO ANALGESIC AGENT: ICD-10-CM

## 2024-07-28 DIAGNOSIS — F31.9 BIPOLAR DISORDER, UNSPECIFIED: ICD-10-CM

## 2024-07-28 DIAGNOSIS — Z98.891 HISTORY OF UTERINE SCAR FROM PREVIOUS SURGERY: Chronic | ICD-10-CM

## 2024-07-28 PROCEDURE — 99284 EMERGENCY DEPT VISIT MOD MDM: CPT

## 2024-07-28 PROCEDURE — 99284 EMERGENCY DEPT VISIT MOD MDM: CPT | Mod: 25

## 2024-07-28 PROCEDURE — 72125 CT NECK SPINE W/O DYE: CPT | Mod: MC

## 2024-07-28 PROCEDURE — 72125 CT NECK SPINE W/O DYE: CPT | Mod: 26,MC

## 2024-07-28 NOTE — ED PROVIDER NOTE - CLINICAL SUMMARY MEDICAL DECISION MAKING FREE TEXT BOX
Pt declined pain meds.  Imaging obtained.  Results d/w pt. Rec cont Tylenol, ice to area.  follow up with PMD. Pt instructed to return if any worsening symptoms or concerns.  They verbalize understanding.

## 2024-07-28 NOTE — ED PROVIDER NOTE - OBJECTIVE STATEMENT
42 y/o female with hx of bipolar disorder, ADHD presents to the ED for evaluation of neck pain worse after preforming frontward tumble this am. no weakness to upper extremities . patient ambulatory . patient able to range neck.

## 2024-07-28 NOTE — ED PROVIDER NOTE - ATTENDING APP SHARED VISIT CONTRIBUTION OF CARE
44 yo F PMHx noted presents for evaluation of neck pain.  Pt states she did a tumblesault on her mattress this morning and felt a pain to her neck.  no weakness, no numbness or tingling.  Pt does not want anything for pain, just concerned that she did something to her neck. on exam pt in NAD AAO x 3, no signs of head trauma, PERRL, no midline vertebral tenderness, neck supple, no step off, good . sensation intact

## 2024-07-28 NOTE — ED PROVIDER NOTE - PATIENT PORTAL LINK FT
You can access the FollowMyHealth Patient Portal offered by [facility] by registering at the following website: [email or web address]. By joining [employer]’s FollowMyHealth portal, you will also be able to view your health information using other applications (apps) compatible with our system.

## 2024-07-28 NOTE — ED ADULT NURSE NOTE - PAIN: PRESENCE, MLM
Doxycycline Counseling:  Patient counseled regarding possible photosensitivity and increased risk for sunburn.  Patient instructed to avoid sunlight, if possible.  When exposed to sunlight, patients should wear protective clothing, sunglasses, and sunscreen.  The patient was instructed to call the office immediately if the following severe adverse effects occur:  hearing changes, easy bruising/bleeding, severe headache, or vision changes.  The patient verbalized understanding of the proper use and possible adverse effects of doxycycline.  All of the patient's questions and concerns were addressed. High Dose Vitamin A Pregnancy And Lactation Text: High dose vitamin A therapy is contraindicated during pregnancy and breast feeding. Topical Retinoid counseling:  Patient advised to apply a pea-sized amount only at bedtime and wait 30 minutes after washing their face before applying.  If too drying, patient may add a non-comedogenic moisturizer. The patient verbalized understanding of the proper use and possible adverse effects of retinoids.  All of the patient's questions and concerns were addressed. Detail Level: Zone Spironolactone Pregnancy And Lactation Text: This medication can cause feminization of the male fetus and should be avoided during pregnancy. The active metabolite is also found in breast milk. Use Enhanced Medication Counseling?: No Benzoyl Peroxide Counseling: Patient counseled that medicine may cause skin irritation and bleach clothing.  In the event of skin irritation, the patient was advised to reduce the amount of the drug applied or use it less frequently.   The patient verbalized understanding of the proper use and possible adverse effects of benzoyl peroxide.  All of the patient's questions and concerns were addressed. Birth Control Pills Counseling: Birth Control Pill Counseling: I discussed with the patient the potential side effects of OCPs including but not limited to increased risk of stroke, heart attack, thrombophlebitis, deep venous thrombosis, hepatic adenomas, breast changes, GI upset, headaches, and depression.  The patient verbalized understanding of the proper use and possible adverse effects of OCPs. All of the patient's questions and concerns were addressed. Tazorac Counseling:  Patient advised that medication is irritating and drying.  Patient may need to apply sparingly and wash off after an hour before eventually leaving it on overnight.  The patient verbalized understanding of the proper use and possible adverse effects of tazorac.  All of the patient's questions and concerns were addressed. Patient Specific Counseling (Will Not Stick From Patient To Patient): Recommending: BP Body wash. Moisturize nightly. Nuetrogena or cerave or la roche foam face wash. Isotretinoin Counseling: Patient should get monthly blood tests, not donate blood, not drive at night if vision affected, not share medication, and not undergo elective surgery for 6 months after tx completed. Side effects reviewed, pt to contact office should one occur. Doxycycline Pregnancy And Lactation Text: This medication is Pregnancy Category D and not consider safe during pregnancy. It is also excreted in breast milk but is considered safe for shorter treatment courses. Topical Clindamycin Pregnancy And Lactation Text: This medication is Pregnancy Category B and is considered safe during pregnancy. It is unknown if it is excreted in breast milk. Include Pregnancy/Lactation Warning?: Yes Azithromycin Counseling:  I discussed with the patient the risks of azithromycin including but not limited to GI upset, allergic reaction, drug rash, diarrhea, and yeast infections. Isotretinoin Pregnancy And Lactation Text: This medication is Pregnancy Category X and is considered extremely dangerous during pregnancy. It is unknown if it is excreted in breast milk. Topical Sulfur Applications Counseling: Topical Sulfur Counseling: Patient counseled that this medication may cause skin irritation or allergic reactions.  In the event of skin irritation, the patient was advised to reduce the amount of the drug applied or use it less frequently.   The patient verbalized understanding of the proper use and possible adverse effects of topical sulfur application.  All of the patient's questions and concerns were addressed. Minocycline Pregnancy And Lactation Text: This medication is Pregnancy Category D and not consider safe during pregnancy. It is also excreted in breast milk. Minocycline Counseling: Patient advised regarding possible photosensitivity and discoloration of the teeth, skin, lips, tongue and gums.  Patient instructed to avoid sunlight, if possible.  When exposed to sunlight, patients should wear protective clothing, sunglasses, and sunscreen.  The patient was instructed to call the office immediately if the following severe adverse effects occur:  hearing changes, easy bruising/bleeding, severe headache, or vision changes.  The patient verbalized understanding of the proper use and possible adverse effects of minocycline.  All of the patient's questions and concerns were addressed. Topical Clindamycin Counseling: Patient counseled that this medication may cause skin irritation or allergic reactions.  In the event of skin irritation, the patient was advised to reduce the amount of the drug applied or use it less frequently.   The patient verbalized understanding of the proper use and possible adverse effects of clindamycin.  All of the patient's questions and concerns were addressed. Bactrim Pregnancy And Lactation Text: This medication is Pregnancy Category D and is known to cause fetal risk.  It is also excreted in breast milk. Topical Retinoid Pregnancy And Lactation Text: This medication is Pregnancy Category C. It is unknown if this medication is excreted in breast milk. Benzoyl Peroxide Pregnancy And Lactation Text: This medication is Pregnancy Category C. It is unknown if benzoyl peroxide is excreted in breast milk. Dapsone Pregnancy And Lactation Text: This medication is Pregnancy Category C and is not considered safe during pregnancy or breast feeding. Erythromycin Pregnancy And Lactation Text: This medication is Pregnancy Category B and is considered safe during pregnancy. It is also excreted in breast milk. denies pain/discomfort (Rating = 0) Dapsone Counseling: I discussed with the patient the risks of dapsone including but not limited to hemolytic anemia, agranulocytosis, rashes, methemoglobinemia, kidney failure, peripheral neuropathy, headaches, GI upset, and liver toxicity.  Patients who start dapsone require monitoring including baseline LFTs and weekly CBCs for the first month, then every month thereafter.  The patient verbalized understanding of the proper use and possible adverse effects of dapsone.  All of the patient's questions and concerns were addressed. Spironolactone Counseling: Patient advised regarding risks of diarrhea, abdominal pain, hyperkalemia, birth defects (for female patients), liver toxicity and renal toxicity. The patient may need blood work to monitor liver and kidney function and potassium levels while on therapy. The patient verbalized understanding of the proper use and possible adverse effects of spironolactone.  All of the patient's questions and concerns were addressed. Tetracycline Counseling: Patient counseled regarding possible photosensitivity and increased risk for sunburn.  Patient instructed to avoid sunlight, if possible.  When exposed to sunlight, patients should wear protective clothing, sunglasses, and sunscreen.  The patient was instructed to call the office immediately if the following severe adverse effects occur:  hearing changes, easy bruising/bleeding, severe headache, or vision changes.  The patient verbalized understanding of the proper use and possible adverse effects of tetracycline.  All of the patient's questions and concerns were addressed. Patient understands to avoid pregnancy while on therapy due to potential birth defects. High Dose Vitamin A Counseling: Side effects reviewed, pt to contact office should one occur. Tazorac Pregnancy And Lactation Text: This medication is not safe during pregnancy. It is unknown if this medication is excreted in breast milk. Birth Control Pills Pregnancy And Lactation Text: This medication should be avoided if pregnant and for the first 30 days post-partum. Topical Sulfur Applications Pregnancy And Lactation Text: This medication is Pregnancy Category C and has an unknown safety profile during pregnancy. It is unknown if this topical medication is excreted in breast milk. Bactrim Counseling:  I discussed with the patient the risks of sulfa antibiotics including but not limited to GI upset, allergic reaction, drug rash, diarrhea, dizziness, photosensitivity, and yeast infections.  Rarely, more serious reactions can occur including but not limited to aplastic anemia, agranulocytosis, methemoglobinemia, blood dyscrasias, liver or kidney failure, lung infiltrates or desquamative/blistering drug rashes. Erythromycin Counseling:  I discussed with the patient the risks of erythromycin including but not limited to GI upset, allergic reaction, drug rash, diarrhea, increase in liver enzymes, and yeast infections. Azithromycin Pregnancy And Lactation Text: This medication is considered safe during pregnancy and is also secreted in breast milk.

## 2024-08-23 NOTE — ED ADULT NURSE NOTE - HARM RISK FACTORS
"SUBJECTIVE:  Rachelle Beckwith is a 3 y.o. female here accompanied by mother and father for ADHD (Diagnosed by psych, looking to get put on medicine)    HPI    Has been diagnosed with ADHD combined type, waiting to get into behavioral therapy but is a few months wait. Were interested in medication to help patient be more regulated for school and then decrease meds once in behavioral therapy. Mom also requested another neb machine as patient's had broken.     Rachelle's allergies, medications, history, and problem list were updated as appropriate.    Review of Systems   A comprehensive review of symptoms was completed and negative except as noted above.    OBJECTIVE:  Vital signs  Vitals:    08/23/24 0903   BP: 98/61   Pulse: 94   Weight: 19.3 kg (42 lb 7 oz)   Height: 3' 8.45" (1.129 m)        Physical Exam  Vitals and nursing note reviewed.   Constitutional:       General: She is active.   HENT:      Right Ear: External ear normal.      Left Ear: External ear normal.   Eyes:      Conjunctiva/sclera: Conjunctivae normal.   Cardiovascular:      Rate and Rhythm: Normal rate and regular rhythm.      Pulses: Normal pulses.   Pulmonary:      Effort: Pulmonary effort is normal.      Breath sounds: Normal breath sounds. No wheezing or rales.   Abdominal:      General: Bowel sounds are normal. There is no distension.      Palpations: Abdomen is soft.      Tenderness: There is no abdominal tenderness.   Musculoskeletal:         General: Normal range of motion.      Cervical back: Normal range of motion.   Skin:     General: Skin is warm.      Capillary Refill: Capillary refill takes less than 2 seconds.   Neurological:      Mental Status: She is alert.          ASSESSMENT/PLAN:  1. Attention deficit hyperactivity disorder (ADHD), combined type  -     methylphenidate HCl (RITALIN) 5 MG tablet; Take 0.5 tablets (2.5 mg total) by mouth every morning.  Dispense: 15 tablet; Refill: 0    2. History of wheezing  -     NEBULIZER " FOR HOME USE      Parent interested in stimulant medication options. Discussed will usually only pursue long acting stimulant options, however per patient's age group will start with short acting and will start at low dosage and make frequent changes prn.     Will start with methylphenidate 2.5 mg po q am. Plan to f/u within 3-4 weeks.     Discussed side effects including decreased appetite, weight loss, mood changes, sleep problems, revealing tics, headaches, stomach aches, and reasons to seek medical attention including palpitations, chest pain, or syncope/near syncope.     Family expressed agreement and understanding of plan and all questions were answered.        No results found for this or any previous visit (from the past 24 hour(s)).    Follow Up:  No follow-ups on file.         no

## 2024-09-02 ENCOUNTER — EMERGENCY (EMERGENCY)
Facility: HOSPITAL | Age: 44
LOS: 0 days | Discharge: HOME | End: 2024-09-02
Attending: EMERGENCY MEDICINE
Payer: MEDICAID

## 2024-09-02 VITALS
DIASTOLIC BLOOD PRESSURE: 102 MMHG | OXYGEN SATURATION: 98 % | HEART RATE: 68 BPM | TEMPERATURE: 98 F | RESPIRATION RATE: 18 BRPM | SYSTOLIC BLOOD PRESSURE: 134 MMHG | HEIGHT: 63 IN

## 2024-09-02 DIAGNOSIS — J02.9 ACUTE PHARYNGITIS, UNSPECIFIED: ICD-10-CM

## 2024-09-02 DIAGNOSIS — R05.1 ACUTE COUGH: ICD-10-CM

## 2024-09-02 DIAGNOSIS — F90.9 ATTENTION-DEFICIT HYPERACTIVITY DISORDER, UNSPECIFIED TYPE: ICD-10-CM

## 2024-09-02 DIAGNOSIS — Z20.822 CONTACT WITH AND (SUSPECTED) EXPOSURE TO COVID-19: ICD-10-CM

## 2024-09-02 DIAGNOSIS — F31.9 BIPOLAR DISORDER, UNSPECIFIED: ICD-10-CM

## 2024-09-02 DIAGNOSIS — Z33.2 ENCOUNTER FOR ELECTIVE TERMINATION OF PREGNANCY: Chronic | ICD-10-CM

## 2024-09-02 DIAGNOSIS — Z98.890 OTHER SPECIFIED POSTPROCEDURAL STATES: Chronic | ICD-10-CM

## 2024-09-02 DIAGNOSIS — Z98.891 HISTORY OF UTERINE SCAR FROM PREVIOUS SURGERY: Chronic | ICD-10-CM

## 2024-09-02 LAB — SARS-COV-2 RNA SPEC QL NAA+PROBE: SIGNIFICANT CHANGE UP

## 2024-09-02 PROCEDURE — 99283 EMERGENCY DEPT VISIT LOW MDM: CPT | Mod: 25

## 2024-09-02 PROCEDURE — 96372 THER/PROPH/DIAG INJ SC/IM: CPT

## 2024-09-02 PROCEDURE — 87635 SARS-COV-2 COVID-19 AMP PRB: CPT

## 2024-09-02 PROCEDURE — 99284 EMERGENCY DEPT VISIT MOD MDM: CPT

## 2024-09-02 RX ORDER — DIPHENHYDRAMINE HYDROCHLORIDE AND LIDOCAINE HYDROCHLORIDE AND ALUMINUM HYDROXIDE AND MAGNESIUM HYDRO
30 KIT ONCE
Refills: 0 | Status: COMPLETED | OUTPATIENT
Start: 2024-09-02 | End: 2024-09-02

## 2024-09-02 RX ORDER — BENZONATATE 100 MG
1 CAPSULE ORAL
Qty: 15 | Refills: 0
Start: 2024-09-02 | End: 2024-09-06

## 2024-09-02 RX ORDER — DEXAMETHASONE 0.75 MG
10 TABLET ORAL ONCE
Refills: 0 | Status: COMPLETED | OUTPATIENT
Start: 2024-09-02 | End: 2024-09-02

## 2024-09-02 RX ADMIN — DIPHENHYDRAMINE HYDROCHLORIDE AND LIDOCAINE HYDROCHLORIDE AND ALUMINUM HYDROXIDE AND MAGNESIUM HYDRO 30 MILLILITER(S): KIT at 08:26

## 2024-09-02 RX ADMIN — Medication 10 MILLIGRAM(S): at 08:24

## 2024-09-02 NOTE — ED PROVIDER NOTE - PHYSICAL EXAMINATION
PHYSICAL EXAM: I have reviewed current vital signs.  GENERAL: NAD, well-nourished; well-developed.  HEAD:  Normocephalic, atraumatic.  EYES: Conjunctiva and sclera clear.  ENT: MMM, no pharyngeal erythema/exudates.  CHEST/LUNG: Clear to auscultation bilaterally; no wheezes, rales, or rhonchi.  HEART: Regular rate and rhythm, normal S1 and S2; no murmurs, rubs, or gallops.  ABDOMEN: Soft, nontender, nondistended.  EXTREMITIES:  2+ peripheral pulses; no clubbing, cyanosis, or edema.  PSYCH: Cooperative, appropriate, normal mood and affect.  NEUROLOGY: A&O x 3. No focal neurological deficits.   SKIN: Warm and dry.

## 2024-09-02 NOTE — ED PROVIDER NOTE - OBJECTIVE STATEMENT
43-year-old female with past medical history of ADHD, bipolar, presents to the ED complaining of a cough for 4 days with associated sore throat.  Patient states she typically has similar symptoms once a year around this time.  Patient is requesting mouthwash, cough Perles, and a shot.  Patient has not had any recent fever, headache, difficulty breathing, vomiting, or diarrhea.

## 2024-09-02 NOTE — ED PROVIDER NOTE - CLINICAL SUMMARY MEDICAL DECISION MAKING FREE TEXT BOX
Patient here with 4 days of cough sore throat requesting cough medication steroid shot Magic mouthwash.  Patient has no fever chills sob. The pt is well-appearing and I agree with above documented exam.  Patient given steroid Magic mouthwash Tessalon Perles outpatient follow-up.  Symptoms likely URI.

## 2024-09-02 NOTE — ED PROVIDER NOTE - PATIENT PORTAL LINK FT
You can access the FollowMyHealth Patient Portal offered by [facility] by registering at the following website: [email or web address]. By joining [employer]’s FollowMyHealth portal, you will also be able to view your health information using other applications (apps) compatible with our system.

## 2024-09-02 NOTE — ED ADULT NURSE NOTE - NSFALLCONCLUSION_ED_ALL_ED
Bed: O35  Expected date:   Expected time:   [facility]  Comments:  87/F Dyspnea A&OX4 170/100 100 99% 16 ECG Faxed.   Universal Safety Interventions

## 2024-09-04 ENCOUNTER — EMERGENCY (EMERGENCY)
Facility: HOSPITAL | Age: 44
LOS: 0 days | Discharge: HOME | End: 2024-09-04
Attending: EMERGENCY MEDICINE
Payer: MEDICAID

## 2024-09-04 VITALS
HEIGHT: 63 IN | SYSTOLIC BLOOD PRESSURE: 136 MMHG | WEIGHT: 175.93 LBS | RESPIRATION RATE: 18 BRPM | DIASTOLIC BLOOD PRESSURE: 86 MMHG | TEMPERATURE: 98 F | HEART RATE: 73 BPM | OXYGEN SATURATION: 97 %

## 2024-09-04 DIAGNOSIS — Z98.890 OTHER SPECIFIED POSTPROCEDURAL STATES: Chronic | ICD-10-CM

## 2024-09-04 DIAGNOSIS — J02.9 ACUTE PHARYNGITIS, UNSPECIFIED: ICD-10-CM

## 2024-09-04 DIAGNOSIS — Z33.2 ENCOUNTER FOR ELECTIVE TERMINATION OF PREGNANCY: Chronic | ICD-10-CM

## 2024-09-04 DIAGNOSIS — H10.029 OTHER MUCOPURULENT CONJUNCTIVITIS, UNSPECIFIED EYE: ICD-10-CM

## 2024-09-04 DIAGNOSIS — Z98.891 HISTORY OF UTERINE SCAR FROM PREVIOUS SURGERY: Chronic | ICD-10-CM

## 2024-09-04 DIAGNOSIS — F31.9 BIPOLAR DISORDER, UNSPECIFIED: ICD-10-CM

## 2024-09-04 DIAGNOSIS — F90.9 ATTENTION-DEFICIT HYPERACTIVITY DISORDER, UNSPECIFIED TYPE: ICD-10-CM

## 2024-09-04 PROCEDURE — 99282 EMERGENCY DEPT VISIT SF MDM: CPT

## 2024-09-04 PROCEDURE — 99284 EMERGENCY DEPT VISIT MOD MDM: CPT

## 2024-09-04 RX ORDER — PROMETHAZINE HYDROCHLORIDE 25 MG/1
25 TABLET ORAL ONCE
Refills: 0 | Status: DISCONTINUED | OUTPATIENT
Start: 2024-09-04 | End: 2024-09-04

## 2024-09-04 RX ORDER — DIPHENHYDRAMINE HYDROCHLORIDE AND LIDOCAINE HYDROCHLORIDE AND ALUMINUM HYDROXIDE AND MAGNESIUM HYDRO
30 KIT ONCE
Refills: 0 | Status: COMPLETED | OUTPATIENT
Start: 2024-09-04 | End: 2024-09-04

## 2024-09-04 RX ORDER — GUAIFENESIN 100 MG/5ML
200 LIQUID ORAL ONCE
Refills: 0 | Status: COMPLETED | OUTPATIENT
Start: 2024-09-04 | End: 2024-09-04

## 2024-09-04 RX ADMIN — DIPHENHYDRAMINE HYDROCHLORIDE AND LIDOCAINE HYDROCHLORIDE AND ALUMINUM HYDROXIDE AND MAGNESIUM HYDRO 30 MILLILITER(S): KIT at 02:35

## 2024-09-04 RX ADMIN — GUAIFENESIN 200 MILLIGRAM(S): 100 LIQUID ORAL at 02:35

## 2024-09-04 NOTE — ED PROVIDER NOTE - CARE PROVIDER_API CALL
Ancelmo Rosas  Orthopaedic Surgery  [address] [place] [address]  Phone: [phone]  Fax: [phone]  Follow Up Time:

## 2024-09-04 NOTE — ED PROVIDER NOTE - PATIENT PORTAL LINK FT
You can access the FollowMyHealth Patient Portal offered by [facility] by registering at the following website: [email or web address]. By joining [employer]’s FollowMyHealth portal, you will also be able to view your health information using other applications (apps) compatible with our system.

## 2024-09-04 NOTE — ED PROVIDER NOTE - NSFOLLOWUPINSTRUCTIONS_ED_ALL_ED_FT
Follow up with your primary care doctor in 1-2 day    Pharyngitis    WHAT YOU NEED TO KNOW:    Pharyngitis, or sore throat, is inflammation of the tissues and structures in your pharynx (throat). Pharyngitis is most often caused by bacteria. It may also be caused by a cold or flu virus. Other causes include smoking, allergies, or acid reflux.     DISCHARGE INSTRUCTIONS:    Call 911 for any of the following:     You have trouble breathing or swallowing because your throat is swollen or sore.        Return to the emergency department if:     You are drooling because it hurts too much to swallow.      Your fever is higher than 102°F (39°C) or lasts longer than 3 days.      You are confused.      You taste blood in your throat.    Contact your healthcare provider if:     Your throat pain gets worse.      You have a painful lump in your throat that does not go away after 5 days.      Your symptoms do not improve after 5 days.      You have questions or concerns about your condition or care.    Medicines: Viral pharyngitis will go away on its own without treatment. Your sore throat should start to feel better in 3 to 5 days for both viral and bacterial infections. You may need any of the following:     Antibiotics treat a bacterial infection.      NSAIDs, such as ibuprofen, help decrease swelling, pain, and fever. NSAIDs can cause stomach bleeding or kidney problems in certain people. If you take blood thinner medicine, always ask your healthcare provider if NSAIDs are safe for you. Always read the medicine label and follow directions.      Acetaminophen decreases pain and fever. It is available without a doctor's order. Ask how much to take and how often to take it. Follow directions. Acetaminophen can cause liver damage if not taken correctly.      Take your medicine as directed. Contact your healthcare provider if you think your medicine is not helping or if you have side effects. Tell him or her if you are allergic to any medicine. Keep a list of the medicines, vitamins, and herbs you take. Include the amounts, and when and why you take them. Bring the list or the pill bottles to follow-up visits. Carry your medicine list with you in case of an emergency.    Manage your symptoms:     Gargle salt water. Mix ¼ teaspoon salt in an 8 ounce glass of warm water and gargle. This may help decrease swelling in your throat.      Drink liquids as directed. You may need to drink more liquids than usual. Liquids may help soothe your throat and prevent dehydration. Ask how much liquid to drink each day and which liquids are best for you.      Use a cool-steam humidifier to help moisten the air in your room and calm your cough.      Soothe your throat with cough drops, ice, soft foods, or popsicles.    Prevent the spread of pharyngitis: Cover your mouth and nose when you cough or sneeze. Do not share food or drinks. Wash your hands often. Use soap and water. If soap and water are unavailable, use an alcohol based hand .     Follow up with your healthcare provider as directed: Write down your questions so you remember to ask them during your visits.        © Copyright [employer] 2019 All illustrations and images included in CareNotes are the copyrighted property of [employer] Inc[employer] or [employer].

## 2024-09-04 NOTE — ED PROVIDER NOTE - OBJECTIVE STATEMENT
43 year old female comes to emergency room for sore throat and cough medicine. patient states that she has not been able to see her doctor and requesting cough medicine to help. patient states the steroids given to her last visit in the emergency room are not helping her cough. no fever/chills. no nausea, vomiting, diarrhea. patient also requesting magic mouth wash.

## 2024-09-04 NOTE — ED PROVIDER NOTE - CLINICAL SUMMARY MEDICAL DECISION MAKING FREE TEXT BOX
43-year-old female with past medical history of ADHD, bipolar, presents to the ED complaining of a cough for 4 days with associated sore throat.  Pt  has appoitnent with her PMD Dr horton tomorrow  but here in ED requesting cough suppressant and magic mouthwash    pt nontoxic no distress resp cta n o tachypnea or wheezing, pharynx no exudate no angioedema  meds given  Patient to be discharged from ED in well appearing condition. Any available test results were discussed with and printed  for patient.  Verbal instructions given, including instructions to return to ED immediately for any new, worsening, or concerning symptoms. Limitations of ED work up discussed.  Patient reports understanding of above with capacity and insight. Written discharge instructions additionally given, including follow-up plan.

## 2024-09-20 ENCOUNTER — EMERGENCY (EMERGENCY)
Facility: HOSPITAL | Age: 44
LOS: 0 days | Discharge: HOME | End: 2024-09-20
Attending: EMERGENCY MEDICINE
Payer: MEDICAID

## 2024-09-20 VITALS
TEMPERATURE: 98 F | HEART RATE: 118 BPM | RESPIRATION RATE: 20 BRPM | HEIGHT: 63 IN | SYSTOLIC BLOOD PRESSURE: 161 MMHG | DIASTOLIC BLOOD PRESSURE: 104 MMHG | OXYGEN SATURATION: 99 % | WEIGHT: 199.96 LBS

## 2024-09-20 DIAGNOSIS — R14.0 ABDOMINAL DISTENSION (GASEOUS): ICD-10-CM

## 2024-09-20 DIAGNOSIS — Z33.2 ENCOUNTER FOR ELECTIVE TERMINATION OF PREGNANCY: Chronic | ICD-10-CM

## 2024-09-20 DIAGNOSIS — Z98.890 OTHER SPECIFIED POSTPROCEDURAL STATES: Chronic | ICD-10-CM

## 2024-09-20 DIAGNOSIS — F17.290 NICOTINE DEPENDENCE, OTHER TOBACCO PRODUCT, UNCOMPLICATED: ICD-10-CM

## 2024-09-20 DIAGNOSIS — R00.0 TACHYCARDIA, UNSPECIFIED: ICD-10-CM

## 2024-09-20 DIAGNOSIS — K59.00 CONSTIPATION, UNSPECIFIED: ICD-10-CM

## 2024-09-20 DIAGNOSIS — Z98.891 HISTORY OF UTERINE SCAR FROM PREVIOUS SURGERY: Chronic | ICD-10-CM

## 2024-09-20 DIAGNOSIS — F31.9 BIPOLAR DISORDER, UNSPECIFIED: ICD-10-CM

## 2024-09-20 PROCEDURE — 99284 EMERGENCY DEPT VISIT MOD MDM: CPT

## 2024-09-20 PROCEDURE — 99283 EMERGENCY DEPT VISIT LOW MDM: CPT

## 2024-09-20 RX ORDER — SUCRALFATE 1 G/10ML
1 SUSPENSION ORAL ONCE
Refills: 0 | Status: COMPLETED | OUTPATIENT
Start: 2024-09-20 | End: 2024-09-20

## 2024-09-20 RX ORDER — MAGNESIUM, ALUMINUM HYDROXIDE 200-225/5
30 SUSPENSION, ORAL (FINAL DOSE FORM) ORAL ONCE
Refills: 0 | Status: COMPLETED | OUTPATIENT
Start: 2024-09-20 | End: 2024-09-20

## 2024-09-20 RX ADMIN — SUCRALFATE 1 GRAM(S): 1 SUSPENSION ORAL at 06:15

## 2024-09-20 RX ADMIN — Medication 30 MILLILITER(S): at 06:29

## 2024-09-20 NOTE — ED PROVIDER NOTE - PHYSICAL EXAMINATION
VITAL SIGNS: I have reviewed nursing notes and confirm.  CONSTITUTIONAL: Well-developed; well-nourished; in no acute distress, anxious  SKIN: Skin exam is warm and dry, no acute rash.  HEAD: Normocephalic; atraumatic.  EYES: PERRL, EOM intact; conjunctiva and sclera clear.  ENT: No nasal discharge; airway clear.   CARD: S1, S2 normal; no murmurs, gallops, or rubs. Regular rate and rhythm.  RESP: No wheezes, rales or rhonchi.  ABD: Normal bowel sounds; soft; non-distended; non-tender; no rebound or guarding  EXT: Normal ROM.   NEURO: Alert, oriented. Grossly unremarkable. No focal deficits.  PSYCH: Cooperative, appropriate.

## 2024-09-20 NOTE — ED PROVIDER NOTE - OBJECTIVE STATEMENT
42 yo F, hx of bipolar disorder, ADHD here for assessment of abdominal bloating. Patient states she was constipated so she took laxatives last night and woke up feeling that her abdomen was "9 months pregnant." She drank juice, passed flatus and the bloating improved, notes mild associated cramping.     No fever, chills,  nausea, vomiting. Currently pain free.

## 2024-09-20 NOTE — ED PROVIDER NOTE - NSFOLLOWUPINSTRUCTIONS_ED_ALL_ED_FT
bdominal Bloating  Comparison of a normal abdomen to a distended abdomen.  When you have abdominal bloating, your abdomen may feel full, tight, or painful. It may also look bigger than normal or swollen (distended). Common causes of abdominal bloating include:  Swallowing air.  Constipation.  Problems digesting food.  Eating too much.  Irritable bowel syndrome. This is a condition that affects the large intestine.  Lactose intolerance. This is an inability to digest lactose, a natural sugar in dairy products.  Celiac disease. This is a condition that affects the ability to digest gluten, a protein found in some grains.  Gastroparesis. This is a condition that slows down the movement of food in the stomach and small intestine. It is more common in people with diabetes mellitus.  Gastroesophageal reflux disease (GERD). This is a condition that makes stomach acid flow back into the esophagus.  Urinary retention. This means that the body is holding onto urine, and the bladder cannot be emptied all the way.  Follow these instructions at home:  Eating and drinking    Avoid eating too much.  Try not to swallow air while talking or eating.  Avoid eating while lying down.  Avoid these foods and drinks:  Foods that cause gas, such as broccoli, cabbage, cauliflower, and baked beans.  Carbonated drinks.  Hard candy.  Chewing gum.  Medicines    Take over-the-counter and prescription medicines only as told by your health care provider.  Take probiotic medicines. These medicines contain live bacteria or yeasts that can help digestion.  Take coated peppermint oil capsules.  General instructions    Try to exercise regularly. Exercise may help to relieve bloating that is caused by gas and relieve constipation.  Keep all follow-up visits. This is important.  Contact a health care provider if:  You have nausea and vomiting.  You have diarrhea.  You have abdominal pain.  You have unusual weight loss or weight gain.  You have severe pain, and medicines do not help.  Get help right away if:  You have chest pain.  You have trouble breathing.  You have shortness of breath.  You have trouble urinating.  You have darker urine than normal.  You have blood in your stools or have dark, tarry stools.  These symptoms may represent a serious problem that is an emergency. Do not wait to see if the symptoms will go away. Get medical help right away. Call your local emergency services (911 in the [place] Do not drive yourself to the hospital.    Summary  Abdominal bloating means that the abdomen is swollen.  Common causes of abdominal bloating are swallowing air, constipation, and problems digesting food.  Avoid eating too much and avoid swallowing air.  Avoid foods that cause gas, carbonated drinks, hard candy, and chewing gum.  This information is not intended to replace advice given to you by your health care provider. Make sure you discuss any questions you have with your health care provider.

## 2024-09-20 NOTE — ED ADULT NURSE NOTE - OBJECTIVE STATEMENT
pt report she is having enlargement of her abdomen and then "she watches it go down"  A/o x3 room air rr even and unlabored, NAD,

## 2024-09-20 NOTE — ED PROVIDER NOTE - CLINICAL SUMMARY MEDICAL DECISION MAKING FREE TEXT BOX
Patient offered bentyl but declined, willing to take sucralfate -- suspect cramping/bloating is from recent laxative use. No localized ttp, no fever, chills to suggest acute intra abdominal infection.    Patient was tachycardic but very anxious, HR improved with reassurance. Will dc home with sx monitoring, return precautions, follow up.

## 2024-09-20 NOTE — ED PROVIDER NOTE - PATIENT PORTAL LINK FT
You can access the FollowMyHealth Patient Portal offered by [facility] by registering at the following website: [email or web address]. By joining [employer]’s FollowMyHealth portal, you will also be able to view your health information using other applications (apps) compatible with our system.

## 2024-09-20 NOTE — ED ADULT NURSE NOTE - BOWEL SOUNDS LUQ
Department of Pediatrics  Outpatient cardiology clinic at [facility]        Reason for Consult:  palpitations      CHIEF COMPLAINT:  palpitations    History Obtained From:  patient, mother    HISTORY OF PRESENT ILLNESS:                The patient is a 14 y.o. female with history of Multiple hereditary osteochondromas, s/p multiple bone tumor resections, presents with 1 year of palpitations. She notes that the episodes occur randomly and last for about a minute. She thinks that they are becoming more frequent. They occur about equally at night and during the day. Last week she awoke with an episode. She is not aware of any thing that makes the episode stop. She notes that while the palpitations are occurring, she does not usually have any other symptoms. However, after it stops she notes occaisionally feeling tired and some times nauseous. She does not get very much exercise, but the episodes have never occurred during activity. She does occasionally have chest pain, but has associated this with her hereditary osteochondromas. She has a history of having multiple \"rib tumors\" and recently saw her orthopedic surgeon, however, they do not recall if the rib tumors have been reassessed recently. There are no other cardiac symptoms- specifically no cyanosis, syncope,  or exercise intolerance. There is no significant family history of early or sudden cardiac death, and no significant family history of congenital heart disease. 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   Allergies:  Lactose, Oxycodone, and Diazepam        Family History:       Problem Relation Age of Onset    Other Mother         hereditary bone disease \"MHE\"    Diabetes Father         type 2    Other Sister         Bone disease \"MHE\"    Other Maternal Grandmother         Bone disease \"MHE\"    Heart Disease Maternal Grandfather     Heart Disease Paternal Grandmother     Diabetes Paternal Grandmother     No Known Problems Paternal Grandfather     Other Sister         Bone disease \"MHE\"     Social History:   Lives at home with parents. 2 sisters. PHYSICAL EXAM:    Vitals:    VITALS:  /85 (Site: Left Upper Arm, Position: Sitting, Cuff Size: Medium Adult) Comment: map 102  Pulse 83   Temp 97.2 °F (36.2 °C) (Skin)   Resp 16   Ht 5' 0.16\" (1.528 m)   Wt 108 lb 6.4 oz (49.2 kg)   LMP 10/18/2021 (Exact Date)   SpO2 99%   BMI 21.06 kg/m²   WEIGHT PERCENTILE:  19 %ile (Z= -0.88) based on CDC (Girls, 2-20 Years) weight-for-age data using vitals from 10/18/2021. LENGTH PERCENTILE:  6 %ile (Z= -1.58) based on CDC (Girls, 2-20 Years) Stature-for-age data based on Stature recorded on 10/18/2021. BMI PERCENTILE 49 %ile (Z= -0.02) based on CDC (Girls, 2-20 Years) BMI-for-age based on BMI available as of 10/18/2021. General: NAD, non-syndromic, acyanotic  HEENT: MMM, nares patent  Resp: Normal work of breathing. CTAB with good aeration and respiratory effort. CV: RRR, soft 3-2/7 systolic murmur when supine at LLSB, no rub or gallop. Normal PMI. Brachial/Femoral pulses were equal and without delay. Cap refil <3 sec  GI: Abdomen soft, NT/ND. No hepatomegaly  MSK: Normal age appropriate movements of all extremities. No clubbing. Neuro: Age appropriate normal neuro status      DATA:    EKG: NSR with low voltages. RAD with possible RVH. Abnormal EKG    Echocardiogram: Moderate right atrial and ventricular dilation, with normal function and normal RV pressures. Aneurysmal atrial septum with a small Left to right shunt noted.   At least 2 pulmonary veins return to the present

## 2024-09-20 NOTE — ED ADULT TRIAGE NOTE - CHIEF COMPLAINT QUOTE
BIBA. Patient states last BM x 4 days. Patient took laxatives yesterday. C/O abdominal pain, bloating abd cramping.

## 2024-09-20 NOTE — ED ADULT NURSE NOTE - CHPI ED NUR DURATION
today Rhombic Flap Text: The defect edges were debeveled with a #15 scalpel blade.  Given the location of the defect and the proximity to free margins a rhombic flap was deemed most appropriate.  Using a sterile surgical marker, an appropriate rhombic flap was drawn incorporating the defect.    The area thus outlined was incised deep to adipose tissue with a #15 scalpel blade.  The skin margins were undermined to an appropriate distance in all directions utilizing iris scissors.

## 2024-09-21 ENCOUNTER — EMERGENCY (EMERGENCY)
Facility: HOSPITAL | Age: 44
LOS: 0 days | Discharge: HOME | End: 2024-09-21
Attending: EMERGENCY MEDICINE
Payer: MEDICAID

## 2024-09-21 VITALS
RESPIRATION RATE: 18 BRPM | WEIGHT: 199.96 LBS | HEIGHT: 63 IN | HEART RATE: 75 BPM | TEMPERATURE: 98 F | OXYGEN SATURATION: 99 %

## 2024-09-21 DIAGNOSIS — M21.611 BUNION OF RIGHT FOOT: ICD-10-CM

## 2024-09-21 DIAGNOSIS — M25.531 PAIN IN RIGHT WRIST: ICD-10-CM

## 2024-09-21 DIAGNOSIS — Z98.891 HISTORY OF UTERINE SCAR FROM PREVIOUS SURGERY: Chronic | ICD-10-CM

## 2024-09-21 DIAGNOSIS — Z33.2 ENCOUNTER FOR ELECTIVE TERMINATION OF PREGNANCY: Chronic | ICD-10-CM

## 2024-09-21 DIAGNOSIS — Z98.890 OTHER SPECIFIED POSTPROCEDURAL STATES: Chronic | ICD-10-CM

## 2024-09-21 DIAGNOSIS — M79.671 PAIN IN RIGHT FOOT: ICD-10-CM

## 2024-09-21 PROCEDURE — 99282 EMERGENCY DEPT VISIT SF MDM: CPT | Mod: 25

## 2024-09-21 PROCEDURE — 29125 APPL SHORT ARM SPLINT STATIC: CPT | Mod: RT

## 2024-09-21 PROCEDURE — 99283 EMERGENCY DEPT VISIT LOW MDM: CPT | Mod: 25

## 2024-09-21 RX ORDER — ACETAMINOPHEN WITH CODEINE 300MG-30MG
1 TABLET ORAL
Qty: 14 | Refills: 0
Start: 2024-09-21 | End: 2024-09-27

## 2024-09-21 NOTE — ED PROVIDER NOTE - ATTENDING APP SHARED VISIT CONTRIBUTION OF CARE
Patient is a 43-year-old female who comes in noticing a right foot bunion and reports that her right wrist has not improved even though she has had negative x-rays.  Denies any new trauma.  No fever.    Exam: Nontender right foot with small bunion noted, normal skin, good pulses, 5 out of 5 dorsiflexion and plantarflexion, no wrist tenderness, no joint effusion, good handgrip, normal skin  Plan: Right foot Ace wrap, right wrist splint, podiatry follow-up    Number of diagnoses or management options:  [x] Referral or consultation made    Complexity of data reviewed:  [x] Decision made to obtain old record(s) or additional history from the family, caretaker, or other source: xrays reviewed  [ ] Laboratory test(s) ordered and/or reviewed  [ ] Independent interpretation of EKG by Dr. Clifton Kolb:  [ ] Independent interpretation of Radiology by Dr. Clifton Kolb:   [ ] I, Clifton Kolb, had a discussion with    Risk (Management Options):  [ ] High: emergency surgery; monitored drug therapy; controlled parenteral therapy; decision for DNR

## 2024-09-21 NOTE — ED PROVIDER NOTE - OBJECTIVE STATEMENT
43-year-old female complaining of right foot pain.  Patient states that her right big toe is popping in and out.  It is also causing pain to the base of the right pinky toe.  Patient denies any injury.  Patient also reports right wrist pain from previous injury.  She was seen in the ED, had x-rays, was told was fine, but she still has pain

## 2024-09-21 NOTE — ED PROVIDER NOTE - PATIENT PORTAL LINK FT
You can access the FollowMyHealth Patient Portal offered by [facility] by registering at the following website: [email or web address]. By joining [employer]’s FollowMyHealth portal, you will also be able to view your health information using other applications (apps) compatible with our system.

## 2024-09-21 NOTE — ED PROVIDER NOTE - NSFOLLOWUPINSTRUCTIONS_ED_ALL_ED_FT
Our Emergency Department Referral Coordinators will be reaching out to you in the next 24-48 hours from 9:00am to 5:00pm with a follow up appointment. Please expect a phone call from the hospital in that time frame. If you do not receive a call or if you have any questions or concerns, you can reach them at   [phone].

## 2024-09-21 NOTE — ED PROVIDER NOTE - PHYSICAL EXAMINATION
Writer spoke to Kelin ESCAMILLA [phone].  Kelin provided writer with updated address for the pt - [address] [place] [phone].     Writer called the pt who asked writer to scheduled transportation for 12:00pm appt. on April 5th.  Writer scheduled transportation with [employer] [phone]      Arian Fontaine, MSW, APSW, MFT-IT [phone]    Physical Exam    Vital Signs: I have reviewed the initial vital signs.  Constitutional: well-nourished, appears stated age, no acute distress  Skin: warm, dry  Muskuloskeletal: Right wrist: NT to palpation. Pain with ROM. No swelling, ecchymosis or erythema. n/v intact. Right foot: + bunion noted. FROM intact.No erythema, swelling, or ecchymosis. n/v intact  Neuro: AOx3, No focal deficits noted

## 2024-09-21 NOTE — ED PROVIDER NOTE - NSFOLLOWUPCLINICS_GEN_ALL_ED_FT
[facility]  Podiatry  .  [place]   Phone: [phone]  Fax:   Follow Up Time: Routine

## 2024-09-21 NOTE — ED PROCEDURE NOTE - PROCEDURE NAME, MLM
Splint Gastric cancer Prophylactic measure Prophylactic measure Sepsis Gastric cancer Gastric cancer Gastric cancer Prophylactic measure Gastric cancer Gastric cancer

## 2024-09-25 NOTE — H&P PST ADULT - BP NONINVASIVE DIASTOLIC (MM HG)
[FreeTextEntry1] : 41-year-old male with a h/o autoimmune disease on Azathioprine 150 mg OD, LIver disease, MASLD not on any meds, now referred to our ofifice with Elevated light chains per hepatology   QI 8/20/24: IGG 1692  IG A 192 Ig M 159 Kappa 2.9 Lambda 1.77 Ratio 1.66  8/12/24: IGG 1985  IG A 221 Ig M 154 Kappa 4.4 Lambda 2.36 Ratio 1.86 6/23/23 : IGG 1867  IG A 215  Ig M121 Kappa 3.91  Lambda 3.01 Ratio 1.3  # elevated free light chains -- Patient with mildly elevated FLC with Kappa 2.9- ~4.4 and Lambda 1.7- 3 With a ratio of ~ 1.3- 1.8  most recently at 1.66  - this is likely non specific  - will send ful  f/u with [facility]ofix/  QI B2 microglobin  - NO CRAB CRiteria Hb ~14 , Ca 9.4 CR 0.8 No spontaneous fractures - f/u with brandt in 5-6 months  73

## 2024-09-26 ENCOUNTER — APPOINTMENT (OUTPATIENT)
Dept: PODIATRY | Facility: CLINIC | Age: 44
End: 2024-09-26
Payer: MEDICAID

## 2024-09-26 ENCOUNTER — RESULT REVIEW (OUTPATIENT)
Age: 44
End: 2024-09-26

## 2024-09-26 ENCOUNTER — OUTPATIENT (OUTPATIENT)
Dept: OUTPATIENT SERVICES | Facility: HOSPITAL | Age: 44
LOS: 1 days | End: 2024-09-26
Payer: MEDICAID

## 2024-09-26 DIAGNOSIS — Z00.00 ENCOUNTER FOR GENERAL ADULT MEDICAL EXAMINATION WITHOUT ABNORMAL FINDINGS: ICD-10-CM

## 2024-09-26 DIAGNOSIS — M21.611 BUNION OF RIGHT FOOT: ICD-10-CM

## 2024-09-26 DIAGNOSIS — Z98.890 OTHER SPECIFIED POSTPROCEDURAL STATES: Chronic | ICD-10-CM

## 2024-09-26 DIAGNOSIS — Z33.2 ENCOUNTER FOR ELECTIVE TERMINATION OF PREGNANCY: Chronic | ICD-10-CM

## 2024-09-26 DIAGNOSIS — Z98.891 HISTORY OF UTERINE SCAR FROM PREVIOUS SURGERY: Chronic | ICD-10-CM

## 2024-09-26 PROCEDURE — 73630 X-RAY EXAM OF FOOT: CPT | Mod: 26,50

## 2024-09-26 PROCEDURE — 99203 OFFICE O/P NEW LOW 30 MIN: CPT

## 2024-09-27 DIAGNOSIS — M21.611 BUNION OF RIGHT FOOT: ICD-10-CM

## 2024-09-30 NOTE — PHYSICAL EXAM
[General Appearance - Alert] : alert [General Appearance - In No Acute Distress] : in no acute distress [2+] : left foot dorsalis pedis 2+ [Normal Foot/Ankle] : Both lower extremities were exposed and visualized. Standing exam demonstrates normal foot posture and alignment. Hindfoot exam shows no hindfoot valgus or varus [Skin Color & Pigmentation] : normal skin color and pigmentation [Sensation] : the sensory exam was normal to light touch and pinprick [No Focal Deficits] : no focal deficits [Deep Tendon Reflexes (DTR)] : deep tendon reflexes were 2+ and symmetric [Motor Exam] : the motor exam was normal [Ankle Swelling (On Exam)] : not present [Varicose Veins Of Lower Extremities] : not present [] : not present [de-identified] : 5/5 muscle strength

## 2024-09-30 NOTE — PHYSICAL EXAM
[General Appearance - Alert] : alert [General Appearance - In No Acute Distress] : in no acute distress [2+] : left foot dorsalis pedis 2+ [Normal Foot/Ankle] : Both lower extremities were exposed and visualized. Standing exam demonstrates normal foot posture and alignment. Hindfoot exam shows no hindfoot valgus or varus [Skin Color & Pigmentation] : normal skin color and pigmentation [Sensation] : the sensory exam was normal to light touch and pinprick [No Focal Deficits] : no focal deficits [Deep Tendon Reflexes (DTR)] : deep tendon reflexes were 2+ and symmetric [Motor Exam] : the motor exam was normal [Ankle Swelling (On Exam)] : not present [Varicose Veins Of Lower Extremities] : not present [] : not present [de-identified] : 5/5 muscle strength

## 2024-10-01 ENCOUNTER — OUTPATIENT (OUTPATIENT)
Dept: OUTPATIENT SERVICES | Facility: HOSPITAL | Age: 44
LOS: 1 days | End: 2024-10-01
Payer: MEDICAID

## 2024-10-01 ENCOUNTER — APPOINTMENT (OUTPATIENT)
Dept: PODIATRY | Facility: CLINIC | Age: 44
End: 2024-10-01
Payer: MEDICAID

## 2024-10-01 DIAGNOSIS — F31.9 BIPOLAR DISORDER, UNSPECIFIED: ICD-10-CM

## 2024-10-01 DIAGNOSIS — M21.611 BUNION OF RIGHT FOOT: ICD-10-CM

## 2024-10-01 DIAGNOSIS — Z33.2 ENCOUNTER FOR ELECTIVE TERMINATION OF PREGNANCY: Chronic | ICD-10-CM

## 2024-10-01 DIAGNOSIS — Z98.890 OTHER SPECIFIED POSTPROCEDURAL STATES: Chronic | ICD-10-CM

## 2024-10-01 DIAGNOSIS — Z98.891 HISTORY OF UTERINE SCAR FROM PREVIOUS SURGERY: Chronic | ICD-10-CM

## 2024-10-01 DIAGNOSIS — Z00.00 ENCOUNTER FOR GENERAL ADULT MEDICAL EXAMINATION WITHOUT ABNORMAL FINDINGS: ICD-10-CM

## 2024-10-01 DIAGNOSIS — M21.612 BUNION OF RIGHT FOOT: ICD-10-CM

## 2024-10-01 PROCEDURE — 99213 OFFICE O/P EST LOW 20 MIN: CPT

## 2024-10-01 PROCEDURE — ZZZZZ: CPT

## 2024-10-01 NOTE — ED ADULT TRIAGE NOTE - CHIEF COMPLAINT QUOTE
Left vm for pt to reschedule consult due to no show   pt states " My psych doctor closed my case because I missed my appointment, I don't have my medications, so he told me to go to ER so he can put me back on program quickly" pt states " My psych doctor closed my case because I missed my appointment, I don't have my medications, so he told me to go to ER so he can put me back on program quickly" Pt denies thoughts of hurting self or others.

## 2024-10-02 PROBLEM — M21.611 BILATERAL BUNIONS: Status: ACTIVE | Noted: 2024-09-26

## 2024-10-02 NOTE — HISTORY OF PRESENT ILLNESS
[FreeTextEntry1] : This is a telehealth visit with 44-year-old female patient status post bilateral x-ray for bilateral hallux limitus

## 2024-10-02 NOTE — ASSESSMENT
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 Discussed with patient conservative and surgical options Patient at this time states that she has failed conservative options with injections and orthotics along with therapy Patient would like to set up surgical date Discussed with patient that she must be seen in person to again review surgical plan and also review imaging and also review incision sites and risks and complications Patient would like to go forward and book surgery At this time it seems as if surgery would be booked about 1 to 2 months out Patient is agreeable Will see the patient in the next 3 weeks for further surgical planning
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
## 2024-10-04 DIAGNOSIS — M79.671 PAIN IN RIGHT FOOT: ICD-10-CM

## 2024-10-04 DIAGNOSIS — M79.672 PAIN IN LEFT FOOT: ICD-10-CM

## 2024-10-04 DIAGNOSIS — M21.611 BUNION OF RIGHT FOOT: ICD-10-CM
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
## 2024-10-10 DIAGNOSIS — M79.672 PAIN IN LEFT FOOT: ICD-10-CM

## 2024-10-10 DIAGNOSIS — M21.611 BUNION OF RIGHT FOOT: ICD-10-CM

## 2024-10-10 DIAGNOSIS — F31.9 BIPOLAR DISORDER, UNSPECIFIED: ICD-10-CM
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
## 2024-10-10 DIAGNOSIS — M79.671 PAIN IN RIGHT FOOT: ICD-10-CM

## 2024-10-11 ENCOUNTER — APPOINTMENT (OUTPATIENT)
Facility: CLINIC | Age: 44
End: 2024-10-11
Payer: MEDICAID

## 2024-10-11 DIAGNOSIS — M25.531 PAIN IN RIGHT WRIST: ICD-10-CM

## 2024-10-11 PROCEDURE — 99202 OFFICE O/P NEW SF 15 MIN: CPT

## 2024-10-11 RX ORDER — IBUPROFEN 800 MG/1
800 TABLET, FILM COATED ORAL 3 TIMES DAILY
Qty: 30 | Refills: 0 | Status: ACTIVE | COMMUNITY
Start: 2024-10-11 | End: 1900-01-01

## 2024-10-27 ENCOUNTER — EMERGENCY (EMERGENCY)
Facility: HOSPITAL | Age: 44
LOS: 0 days | Discharge: HOME | End: 2024-10-27
Attending: EMERGENCY MEDICINE
Payer: MEDICAID

## 2024-10-27 VITALS
HEART RATE: 87 BPM | WEIGHT: 199.96 LBS | TEMPERATURE: 98 F | DIASTOLIC BLOOD PRESSURE: 73 MMHG | HEIGHT: 63 IN | OXYGEN SATURATION: 99 % | RESPIRATION RATE: 18 BRPM | SYSTOLIC BLOOD PRESSURE: 124 MMHG

## 2024-10-27 DIAGNOSIS — B00.9 HERPESVIRAL INFECTION, UNSPECIFIED: ICD-10-CM

## 2024-10-27 DIAGNOSIS — Z98.890 OTHER SPECIFIED POSTPROCEDURAL STATES: Chronic | ICD-10-CM

## 2024-10-27 DIAGNOSIS — Z33.2 ENCOUNTER FOR ELECTIVE TERMINATION OF PREGNANCY: Chronic | ICD-10-CM

## 2024-10-27 DIAGNOSIS — Z98.891 HISTORY OF UTERINE SCAR FROM PREVIOUS SURGERY: Chronic | ICD-10-CM

## 2024-10-27 PROCEDURE — 99282 EMERGENCY DEPT VISIT SF MDM: CPT

## 2024-10-27 PROCEDURE — 99283 EMERGENCY DEPT VISIT LOW MDM: CPT

## 2024-10-27 NOTE — ED PROVIDER NOTE - PHYSICAL EXAMINATION
Vital Signs: I have reviewed the initial vital signs.  Constitutional: well-nourished, appears stated age, no acute distress  Head: atraumatic and normocephalic  Eyes:PERRLA, EOMI, no nystagmus, clear conjunctiva  ENT: right lower lip grouped cluster of vesicles, no surrounding erythema or swelling, non weeping, oropharynx clear, no dental injury, MMM  Neuro: awake, alert, follows commands, oriented, no focal deficits, GCS 15  ;

## 2024-10-27 NOTE — ED PROVIDER NOTE - CLINICAL SUMMARY MEDICAL DECISION MAKING FREE TEXT BOX
Patient is a 44-year-old female presents for evaluation of sores noted to the right lower lip states that the burning and painful nature over the past several days noticed intraoral lesions no drooling no fevers no chills no other rashes noted    On physical exam patient has vesicular rash to the right lower lip most consistent with herpes simplex oropharynx clear chest clear to auscultation bilaterally abdomen soft nontender nondistended bowel sounds positive patient is able to ambulate well    Assessment plan patient has a rash to lower right lip most consistent with herpes simplex advised Abreva over-the-counter I will discharge at this time

## 2024-10-27 NOTE — ED PROVIDER NOTE - ATTENDING APP SHARED VISIT CONTRIBUTION OF CARE
I have personally performed a history and physical exam on this patient and personally directed the management of the patient. Patient is a 44-year-old female presents for evaluation of sores noted to the right lower lip states that the burning and painful nature over the past several days noticed intraoral lesions no drooling no fevers no chills no other rashes noted    On physical exam patient has vesicular rash to the right lower lip most consistent with herpes simplex oropharynx clear chest clear to auscultation bilaterally abdomen soft nontender nondistended bowel sounds positive patient is able to ambulate well    Assessment plan patient has a rash to lower right lip most consistent with herpes simplex advised Abreva over-the-counter I will discharge at this time

## 2024-10-27 NOTE — ED PROVIDER NOTE - PATIENT PORTAL LINK FT
You can access the FollowMyHealth Patient Portal offered by [facility] by registering at the following website: [email or web address]. By joining [employer]’s FollowMyHealth portal, you will also be able to view your health information using other applications (apps) compatible with our system.

## 2024-10-27 NOTE — ED PROVIDER NOTE - NSFOLLOWUPINSTRUCTIONS_ED_ALL_ED_FT
use abreva which is over the counter to area twice daily      Cold Sore    A cold sore, also called a fever blister, is a small, fluid-filled sore that forms inside the mouth or on the lips, gums, nose, chin, or cheeks. Cold sores can spread to other parts of the body, such as the eyes or fingers. In some people who have other medical conditions, cold sores can spread to multiple other body sites, including the genitals.    Cold sores can spread from person to person (are contagious) until the sores crust over completely. Most cold sores go away within 2 weeks.    What are the causes?    Cold sores are caused by an infection from a common type of herpes simplex virus (HSV-1). HSV-1 is closely related to the HSV-2virus, which is the virus that causes genital herpes, but these viruses are not the same. Once a person is infected with HSV-1, the virus remains permanently in the body.    HSV-1 is spread from person to person through close contact, such as through kissing, touching the affected area, or sharing personal items such as lip balm, razors, a drinking glass, or eating utensils.    What increases the risk?  You are more likely to develop this condition if you:  •Are tired, stressed, or sick.  •Are menstruating.  •Are pregnant.  •Take certain medicines.  •Are exposed to cold weather or too much sun.    What are the signs or symptoms?  Symptoms of a cold sore outbreak go through different stages. These are the stages of a cold sore:  •Tingling, itching, or burning is felt 1–2 days before the outbreak.  •Fluid-filled blisters appear on the lips, inside the mouth, on the nose, or on the cheeks.  •The blisters start to ooze clear fluid.  •The blisters dry up, and a yellow crust appears in their place.  •The crust falls off.    In some cases, other symptoms can develop during a cold sore outbreak. These can include:  •Fever.  •Sore throat.  •Headache.  •Muscle aches.  Swollen neck glands.    How is this diagnosed?    This condition is diagnosed based on your medical history and a physical exam. Your health care provider may do a blood test or may swab some fluid from your sore and then examine the swab in the lab.    How is this treated?  There is no cure for cold sores or HSV-1. There is also no vaccine for HSV-1. Most cold sores go away on their own without treatment within 2 weeks. Medicines cannot make the infection go away, but your health care provider may prescribe medicines to:  •Help relieve some of the pain associated with the sores.  •Work to stop the virus from multiplying.  •Shorten healing time.    Medicines may be in the form of creams, gels, pills, or a shot.    Follow these instructions at home:    Medicines   •Take or apply over-the-counter and prescription medicines only as told by your health care provider.  Use a cotton-tip swab to apply creams or gels to your sores.  •Ask your health care provider if you can take lysine supplements. Research has found that lysine may help heal the cold sore faster and prevent outbreaks.    Sore care    • Do not touch the sores or pick the scabs.  •Wash your hands often. Do not touch your eyes without washing your hands first.  •Keep the sores clean and dry.  •If directed, apply ice to the sores:  •Put ice in a plastic bag.  •Place a towel between your skin and the bag.  •Leave the ice on for 20 minutes, 2–3 times a day.    Eating and drinking   •Eat a soft, bland diet. Avoid eating hot, cold, or salty foods.  •Use a straw if it hurts to drink out of a glass.  •Eat foods that are rich in lysine, such as meat, fish, and dairy products.  •Avoid sugary foods, chocolates, nuts, and grains. These foods are rich in a nutrient called arginine, which can cause the virus to multiply.    Lifestyle   • Do not kiss, have oral sex, or share personal items until your sores heal.  •Stress, poor sleep, and being out in the sun can trigger outbreaks. Make sure you:  •Do activities that help you relax, such as deep breathing exercises or meditation.  •Get enough sleep.  •Apply sunscreen on your lips before you go out in the sun.    Contact a health care provider if:  •You have symptoms for more than 2 weeks.  •You have pus coming from the sores.  •You have redness that is spreading.  •You have pain or irritation in your eye.  •You get sores on your genitals.  •Your sores do not heal within 2 weeks.  •You have frequent cold sore outbreaks.    Get help right away if you have:  •A fever and your symptoms suddenly get worse.  •A headache and confusion.  •Fatigue or loss of appetite.  •A stiff neck or sensitivity to light.    Summary  •A cold sore, also called a fever blister, is a small, fluid-filled sore that forms inside the mouth or on the lips, gums, nose, chin, or cheeks.  •Most cold sores go away on their own without treatment within 2 weeks. Your health care provider may prescribe medicines to help relieve some of the pain, work to stop the virus from multiplying, and shorten healing time.  •Wash your hands often. Do not touch your eyes without washing your hands first.  • Do not kiss, have oral sex, or share personal items until your sores heal.  •Contact a health care provider if your sores do not heal within 2 weeks.    This information is not intended to replace advice given to you by your health care provider. Make sure you discuss any questions you have with your health care provider.

## 2024-10-27 NOTE — ED PROVIDER NOTE - OBJECTIVE STATEMENT
43 y/o female presents to the ED for evaluation of sores noted to right lower lip since yesterday. patient c/o burning sensation. no surrounding redness or swelling. no fevers. no dental pain

## 2024-10-31 ENCOUNTER — APPOINTMENT (OUTPATIENT)
Dept: PODIATRY | Facility: CLINIC | Age: 44
End: 2024-10-31
Payer: MEDICAID

## 2024-10-31 ENCOUNTER — OUTPATIENT (OUTPATIENT)
Dept: OUTPATIENT SERVICES | Facility: HOSPITAL | Age: 44
LOS: 1 days | End: 2024-10-31
Payer: MEDICAID

## 2024-10-31 DIAGNOSIS — Z33.2 ENCOUNTER FOR ELECTIVE TERMINATION OF PREGNANCY: Chronic | ICD-10-CM
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
## 2024-10-31 DIAGNOSIS — M21.612 BUNION OF LEFT FOOT: ICD-10-CM

## 2024-10-31 DIAGNOSIS — Z98.891 HISTORY OF UTERINE SCAR FROM PREVIOUS SURGERY: Chronic | ICD-10-CM

## 2024-10-31 DIAGNOSIS — Z98.890 OTHER SPECIFIED POSTPROCEDURAL STATES: Chronic | ICD-10-CM

## 2024-10-31 DIAGNOSIS — M79.672 PAIN IN LEFT FOOT: ICD-10-CM
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
## 2024-10-31 DIAGNOSIS — Z00.00 ENCOUNTER FOR GENERAL ADULT MEDICAL EXAMINATION WITHOUT ABNORMAL FINDINGS: ICD-10-CM

## 2024-10-31 DIAGNOSIS — M21.611 BUNION OF RIGHT FOOT: ICD-10-CM

## 2024-10-31 DIAGNOSIS — M79.671 PAIN IN RIGHT FOOT: ICD-10-CM

## 2024-10-31 PROCEDURE — 99214 OFFICE O/P EST MOD 30 MIN: CPT | Mod: 95

## 2024-10-31 PROCEDURE — 99214 OFFICE O/P EST MOD 30 MIN: CPT

## 2024-11-12 NOTE — BEHAVIORAL HEALTH ASSESSMENT NOTE - ATTENTION / CONCENTRATION
Patient's wife called stating that patient is having issues with sleep and insists on speaking with the provider as soon as possible.  Patient wife states she wants to speak to the provider and not her assistant.  Please advise.    Impaired

## 2024-11-13 NOTE — ED ADULT TRIAGE NOTE - NS ED NURSE BANDS TYPE
----- Message from Janna Leo MD sent at 11/11/2024 12:37 PM CST -----  Please inform patient that her PFTs show evidence of restrictive lung disease.  Recommend that she follow-up with pulmonology, last visit September 2023.  Thank you.  
Patient was contacted regarding results. Please Secure Chat RN if patient returns this call.     
The patient was contacted regarding lab results. Patient's mobile phone voicemail box is full. Letter sent in mail notifying patient to call clinic for lab results.   
The patient was contacted regarding lab results. Please secure message RN if the patient returns this call.    
Name band;

## 2024-11-27 ENCOUNTER — OUTPATIENT (OUTPATIENT)
Dept: OUTPATIENT SERVICES | Facility: HOSPITAL | Age: 44
LOS: 1 days | End: 2024-11-27
Payer: MEDICAID

## 2024-11-27 VITALS
OXYGEN SATURATION: 97 % | DIASTOLIC BLOOD PRESSURE: 79 MMHG | WEIGHT: 190.92 LBS | TEMPERATURE: 98 F | HEART RATE: 85 BPM | SYSTOLIC BLOOD PRESSURE: 112 MMHG | HEIGHT: 63 IN | RESPIRATION RATE: 18 BRPM

## 2024-11-27 DIAGNOSIS — Z33.2 ENCOUNTER FOR ELECTIVE TERMINATION OF PREGNANCY: Chronic | ICD-10-CM

## 2024-11-27 DIAGNOSIS — Z01.818 ENCOUNTER FOR OTHER PREPROCEDURAL EXAMINATION: ICD-10-CM

## 2024-11-27 DIAGNOSIS — Z98.890 OTHER SPECIFIED POSTPROCEDURAL STATES: Chronic | ICD-10-CM

## 2024-11-27 DIAGNOSIS — M20.5X1 OTHER DEFORMITIES OF TOE(S) (ACQUIRED), RIGHT FOOT: ICD-10-CM

## 2024-11-27 DIAGNOSIS — Z98.891 HISTORY OF UTERINE SCAR FROM PREVIOUS SURGERY: Chronic | ICD-10-CM

## 2024-11-27 LAB
ALBUMIN SERPL ELPH-MCNC: 4.6 G/DL — SIGNIFICANT CHANGE UP (ref 3.5–5.2)
ALP SERPL-CCNC: 82 U/L — SIGNIFICANT CHANGE UP (ref 30–115)
ALT FLD-CCNC: 15 U/L — SIGNIFICANT CHANGE UP (ref 0–41)
ANION GAP SERPL CALC-SCNC: 14 MMOL/L — SIGNIFICANT CHANGE UP (ref 7–14)
AST SERPL-CCNC: 20 U/L — SIGNIFICANT CHANGE UP (ref 0–41)
BASOPHILS # BLD AUTO: 0.05 K/UL — SIGNIFICANT CHANGE UP (ref 0–0.2)
BASOPHILS NFR BLD AUTO: 0.8 % — SIGNIFICANT CHANGE UP (ref 0–1)
BILIRUB SERPL-MCNC: 0.4 MG/DL — SIGNIFICANT CHANGE UP (ref 0.2–1.2)
BUN SERPL-MCNC: 8 MG/DL — LOW (ref 10–20)
CALCIUM SERPL-MCNC: 9.9 MG/DL — SIGNIFICANT CHANGE UP (ref 8.4–10.5)
CHLORIDE SERPL-SCNC: 100 MMOL/L — SIGNIFICANT CHANGE UP (ref 98–110)
CO2 SERPL-SCNC: 25 MMOL/L — SIGNIFICANT CHANGE UP (ref 17–32)
CREAT SERPL-MCNC: 0.7 MG/DL — SIGNIFICANT CHANGE UP (ref 0.7–1.5)
EGFR: 108 ML/MIN/1.73M2 — SIGNIFICANT CHANGE UP
EOSINOPHIL # BLD AUTO: 0.08 K/UL — SIGNIFICANT CHANGE UP (ref 0–0.7)
EOSINOPHIL NFR BLD AUTO: 1.3 % — SIGNIFICANT CHANGE UP (ref 0–8)
GLUCOSE SERPL-MCNC: 86 MG/DL — SIGNIFICANT CHANGE UP (ref 70–99)
HCT VFR BLD CALC: 43 % — SIGNIFICANT CHANGE UP (ref 37–47)
HGB BLD-MCNC: 14.4 G/DL — SIGNIFICANT CHANGE UP (ref 12–16)
IMM GRANULOCYTES NFR BLD AUTO: 0.3 % — SIGNIFICANT CHANGE UP (ref 0.1–0.3)
LYMPHOCYTES # BLD AUTO: 1.54 K/UL — SIGNIFICANT CHANGE UP (ref 1.2–3.4)
LYMPHOCYTES # BLD AUTO: 25.8 % — SIGNIFICANT CHANGE UP (ref 20.5–51.1)
MCHC RBC-ENTMCNC: 30.9 PG — SIGNIFICANT CHANGE UP (ref 27–31)
MCHC RBC-ENTMCNC: 33.5 G/DL — SIGNIFICANT CHANGE UP (ref 32–37)
MCV RBC AUTO: 92.3 FL — SIGNIFICANT CHANGE UP (ref 81–99)
MONOCYTES # BLD AUTO: 0.64 K/UL — HIGH (ref 0.1–0.6)
MONOCYTES NFR BLD AUTO: 10.7 % — HIGH (ref 1.7–9.3)
NEUTROPHILS # BLD AUTO: 3.64 K/UL — SIGNIFICANT CHANGE UP (ref 1.4–6.5)
NEUTROPHILS NFR BLD AUTO: 61.1 % — SIGNIFICANT CHANGE UP (ref 42.2–75.2)
NRBC # BLD: 0 /100 WBCS — SIGNIFICANT CHANGE UP (ref 0–0)
PLATELET # BLD AUTO: 283 K/UL — SIGNIFICANT CHANGE UP (ref 130–400)
PMV BLD: 12.1 FL — HIGH (ref 7.4–10.4)
POTASSIUM SERPL-MCNC: 4.5 MMOL/L — SIGNIFICANT CHANGE UP (ref 3.5–5)
POTASSIUM SERPL-SCNC: 4.5 MMOL/L — SIGNIFICANT CHANGE UP (ref 3.5–5)
PROT SERPL-MCNC: 7.3 G/DL — SIGNIFICANT CHANGE UP (ref 6–8)
RBC # BLD: 4.66 M/UL — SIGNIFICANT CHANGE UP (ref 4.2–5.4)
RBC # FLD: 12.6 % — SIGNIFICANT CHANGE UP (ref 11.5–14.5)
SODIUM SERPL-SCNC: 139 MMOL/L — SIGNIFICANT CHANGE UP (ref 135–146)
WBC # BLD: 5.97 K/UL — SIGNIFICANT CHANGE UP (ref 4.8–10.8)
WBC # FLD AUTO: 5.97 K/UL — SIGNIFICANT CHANGE UP (ref 4.8–10.8)

## 2024-11-27 PROCEDURE — 80053 COMPREHEN METABOLIC PANEL: CPT

## 2024-11-27 PROCEDURE — 36415 COLL VENOUS BLD VENIPUNCTURE: CPT

## 2024-11-27 PROCEDURE — 99214 OFFICE O/P EST MOD 30 MIN: CPT | Mod: 25

## 2024-11-27 PROCEDURE — 93005 ELECTROCARDIOGRAM TRACING: CPT

## 2024-11-27 PROCEDURE — 93010 ELECTROCARDIOGRAM REPORT: CPT

## 2024-11-27 PROCEDURE — 85025 COMPLETE CBC W/AUTO DIFF WBC: CPT

## 2024-11-27 NOTE — H&P PST ADULT - REASON FOR ADMISSION
Case Type: OP Non-block TimeSuite: CASProceduralist: William Kaiser  Confirmed Surgery DateTime: 12- - 0:00PAST DateTime: 11- - 6:15Procedure: CHEILECTOMY RIGHT FOOT  ERP?: UnavailableLaterality: RightLength of Procedure: 60 Minutes  Anesthesia Type: Local Standby

## 2024-11-27 NOTE — H&P PST ADULT - NSICDXPASTSURGICALHX_GEN_ALL_CORE_FT
PAST SURGICAL HISTORY:  Fetal death from pregnancy termination x4-5    History of appendectomy     History of  delivery     History of surgery on arm

## 2024-11-27 NOTE — H&P PST ADULT - HISTORY OF PRESENT ILLNESS
Patient is a 44 year old  female presenting to PAST in preparation for  CHEILECTOMY RIGHT FOOT on 12-  under  Local Standby anesthesia by Dr.William Kaiser  .    Patient complains of right foot pain on bunion when wearing tight shoes.  Pain does not radiate.  Reports pain 8/10 when wearing tight shoes and walking.      PATIENT CURRENTLY DENIES CHEST PAIN  SHORTNESS OF BREATH  PALPITATIONS,  DYSURIA, OR UPPER RESPIRATORY INFECTION IN PAST 2 WEEKS    denies travel outside the [place] in the past 30 days    Anesthesia Alert  YES--Difficult Airway CLASS 3 AIRWAY  NO--History of neck surgery or radiation  NO--Limited ROM of neck  NO--History of Malignant hyperthermia  NO--No personal or family history of Pseudocholinesterase deficiency.  NO--Prior Anesthesia Complication  NO--Latex Allergy  NO--Loose teeth  NO--History of Rheumatoid Arthritis  NO--Bleeding risk  NO--CHRISTIE  NO--Other_____    PT DENIES ANY RASHES, ABRASION, OR OPEN WOUNDS OR CUTS    AS PER THE PATIENT, THIS IS HIS/HER COMPLETE MEDICAL AND SURGICAL HX, INCLUDING MEDICATIONS PRESCRIBED AND OVER THE COUNTER    Patient communicated understanding of instructions and was given the opportunity to ask questions and have them answered.    pt denies any suicidal ideation or thoughts, pt states not a threat to self or others    Revised Cardiac Risk Index for Pre-Operative Risk from [employer]  on 11/27/2024  ** All calculations should be rechecked by clinician prior to use **    RESULT SUMMARY:  0 points  RCRI Score    3.9 %  Risk of major cardiac event      INPUTS:  High-risk surgery —> 0 = No  History of ischemic heart disease —> 0 = No  History of congestive heart failure —> 0 = No  History of cerebrovascular disease —> 0 = No  Pre-operative treatment with insulin —> 0 = No  Pre-operative creatinine >2 mg/dL / 176.8 µmol/L —> 0 = No    D[facility] Activity Status Index (DASI) from [employer]  on 11/27/2024  ** All calculations should be rechecked by clinician prior to use **    RESULT SUMMARY:  36.7 points  The higher the score (maximum 58.2), the higher the functional status.    7.25 METs        INPUTS:  Take care of self —> 2.75 = Yes  Walk indoors —> 1.75 = Yes  Walk 1&ndash;2 blocks on level ground —> 2.75 = Yes  Climb a flight of stairs or walk up a hill —> 5.5 = Yes  Run a short distance —> 0 = No  Do light work around the house —> 2.7 = Yes  Do moderate work around the house —> 3.5 = Yes  Do heavy work around the house —> 8 = Yes  Do yardwork —> 4.5 = Yes  Have sexual relations —> 5.25 = Yes  Participate in moderate recreational activities —> 0 = No  Participate in strenuous sports —> 0 = No

## 2024-11-28 DIAGNOSIS — Z01.818 ENCOUNTER FOR OTHER PREPROCEDURAL EXAMINATION: ICD-10-CM

## 2024-11-28 DIAGNOSIS — M20.5X1 OTHER DEFORMITIES OF TOE(S) (ACQUIRED), RIGHT FOOT: ICD-10-CM

## 2024-12-12 NOTE — ASU PATIENT PROFILE, ADULT - FALL HARM RISK - UNIVERSAL INTERVENTIONS
Bed in lowest position, wheels locked, appropriate side rails in place/Call bell, personal items and telephone in reach/Instruct patient to call for assistance before getting out of bed or chair/Non-slip footwear when patient is out of bed/[place] to call system/Physically safe environment - no spills, clutter or unnecessary equipment/Purposeful Proactive Rounding/Room/bathroom lighting operational, light cord in reach

## 2024-12-12 NOTE — ASU PATIENT PROFILE, ADULT - NSICDXPASTSURGICALHX_GEN_ALL_CORE_FT
PAST SURGICAL HISTORY:  Fetal death from pregnancy termination x4-5    History of appendectomy     History of  delivery     History of surgery on arm left elbow    S/P right knee arthroscopy

## 2024-12-12 NOTE — ASU PATIENT PROFILE, ADULT - NS PRO PASSIVE SMOKE EXP
Consent: Written consent obtained, risks reviewed including but not limited to crusting, scabbing, blistering, scarring, darker or lighter pigmentary change, incidental hair removal, bruising, and/or incomplete removal. Yes...

## 2024-12-12 NOTE — ASU PATIENT PROFILE, ADULT - NSICDXPASTMEDICALHX_GEN_ALL_CORE_FT
PAST MEDICAL HISTORY:  ADHD     Arthritis OA    Bipolar disorder pt stopped lithium 1-1.5 mos ago    Insomnia     Right wrist fracture

## 2024-12-12 NOTE — ASU PATIENT PROFILE, ADULT - NS TRANSFER PATIENT BELONGINGS
Assessment and Plan


Cardiac arrest with ROSC


Syncope


h/o Cardiomyopathy


h/o substance abuse disorder.


Aspiration pneumonitis


Cardiomyopathy EF 25% on recent Echo








-Replete electrolytes


-Keep K>4 and Magnesium at 2


-Mobility


-Cardio-protective measures


-Substance abuse counselling


-prn bronchodilators 


-Supplemental oxygen as indicated for O2 sats<88%


-[facility] in the morning








Subjective


Date of service: 10/21/18


Principal diagnosis: cardiac arrest


Interval history: 





Follow up: s/p cardiac arrest with ROSC, syncope, substance abuse disorder





Seen and examined.


Vitals, labs, medications, chart reviewed.


No acute overnight events. Consulted nursing staff


She denies any shortness of breath, no fevers or chills.


No nausea, no diarrhea. She feels much better


Ambulating in the room, has chest pain which she attributes to chest 

compression.


For [facility] tomorrow per Cardiology





Objective


 Vital Signs - 12hr











  10/20/18 10/20/18 10/20/18





  19:50 19:57 20:31


 


Temperature   98.4 F


 


Pulse Rate  94 H 81


 


Pulse Rate [   





Right Radial]   


 


Respiratory   20





Rate   


 


Respiratory 17  





Rate [   





Generalized]   


 


Blood Pressure   125/87


 


O2 Sat by Pulse   98





Oximetry   














  10/20/18 10/20/18 10/21/18





  21:50 22:00 00:34


 


Temperature   


 


Pulse Rate 81  


 


Pulse Rate [  90 





Right Radial]   


 


Respiratory  18 17





Rate   


 


Respiratory   





Rate [   





Generalized]   


 


Blood Pressure 125/87  


 


O2 Sat by Pulse   





Oximetry   














  10/21/18 10/21/18





  00:46 01:04


 


Temperature 98.1 F 


 


Pulse Rate 71 


 


Pulse Rate [  





Right Radial]  


 


Respiratory 20 17





Rate  


 


Respiratory  





Rate [  





Generalized]  


 


Blood Pressure 120/86 


 


O2 Sat by Pulse 97 





Oximetry  











Constitutional: no acute distress, alert, other


Eyes: non-icteric


ENT: oropharynx moist


Neck: supple, no lymphadenopathy


Effort: normal


Ascultation: Bilateral: clear


Cardiovascular: regular rate and rhythm, other (paced)


Gastrointestinal: normoactive bowel sounds, soft, non-tender, non-distended


Integumentary: normal


Extremities: no cyanosis, no edema, pink and warm


Neurologic: normal mental status, non-focal exam


Psychiatric: mood appropriate, affect normal


CBC and BMP: 


 10/18/18 04:33





 10/18/18 04:33


ABG, PT/INR, D-dimer: 


PT/INR, D-dimer











PT  14.1 Sec. (12.2-14.9)   10/16/18  00:39    


 


INR  1.04  (0.87-1.13)   10/16/18  00:39    


 


D-Dimer  558.12 ng/mlDDU (0-234)  H  10/16/18  15:00    








Abnormal lab findings: 


 Abnormal Labs











  10/16/18 10/16/18 10/16/18





  00:37 00:37 05:48


 


WBC  12.2 H  


 


RBC  3.59 L  


 


MCH  33 H  


 


Mono % (Auto)  8.2 H  


 


Mono #  1.0 H  


 


Seg Neutrophils %  71.8 H  


 


Seg Neutrophils #  8.8 H  


 


D-Dimer   


 


Potassium   2.8 L* 


 


Carbon Dioxide   


 


Creatinine   


 


Glucose   124 H 


 


Total Creatine Kinase    530 H


 


CK-MB (CK-2)    5.9 H














  10/16/18 10/16/18 10/17/18





  15:00 15:00 07:17


 


WBC   


 


RBC   


 


MCH   


 


Mono % (Auto)   


 


Mono #   


 


Seg Neutrophils %   


 


Seg Neutrophils #   


 


D-Dimer   558.12 H 


 


Potassium   


 


Carbon Dioxide  20 L  


 


Creatinine  0.6 L   0.5 L


 


Glucose  120 H   110 H


 


Total Creatine Kinase   


 


CK-MB (CK-2)   














  10/18/18 10/18/18





  04:33 04:33


 


WBC  


 


RBC  3.25 L 


 


MCH  33 H 


 


Mono % (Auto)  10.4 H 


 


Mono #  0.9 H 


 


Seg Neutrophils %  


 


Seg Neutrophils #  


 


D-Dimer  


 


Potassium  


 


Carbon Dioxide  


 


Creatinine   0.6 L


 


Glucose   113 H


 


Total Creatine Kinase  


 


CK-MB (CK-2)  











Allied health notes reviewed: nursing Clothing

## 2024-12-13 ENCOUNTER — RESULT REVIEW (OUTPATIENT)
Age: 44
End: 2024-12-13

## 2024-12-13 ENCOUNTER — TRANSCRIPTION ENCOUNTER (OUTPATIENT)
Age: 44
End: 2024-12-13

## 2024-12-13 ENCOUNTER — OUTPATIENT (OUTPATIENT)
Dept: OUTPATIENT SERVICES | Facility: HOSPITAL | Age: 44
LOS: 1 days | Discharge: HOME | End: 2024-12-13
Payer: MEDICAID

## 2024-12-13 VITALS
DIASTOLIC BLOOD PRESSURE: 82 MMHG | HEART RATE: 72 BPM | OXYGEN SATURATION: 100 % | SYSTOLIC BLOOD PRESSURE: 130 MMHG | RESPIRATION RATE: 20 BRPM | TEMPERATURE: 98 F

## 2024-12-13 VITALS
OXYGEN SATURATION: 98 % | RESPIRATION RATE: 17 BRPM | WEIGHT: 190.92 LBS | TEMPERATURE: 98 F | HEART RATE: 66 BPM | SYSTOLIC BLOOD PRESSURE: 112 MMHG | HEIGHT: 63 IN | DIASTOLIC BLOOD PRESSURE: 64 MMHG

## 2024-12-13 DIAGNOSIS — Z98.890 OTHER SPECIFIED POSTPROCEDURAL STATES: Chronic | ICD-10-CM

## 2024-12-13 DIAGNOSIS — Z98.891 HISTORY OF UTERINE SCAR FROM PREVIOUS SURGERY: Chronic | ICD-10-CM

## 2024-12-13 DIAGNOSIS — M20.5X1 OTHER DEFORMITIES OF TOE(S) (ACQUIRED), RIGHT FOOT: ICD-10-CM

## 2024-12-13 DIAGNOSIS — Z33.2 ENCOUNTER FOR ELECTIVE TERMINATION OF PREGNANCY: Chronic | ICD-10-CM

## 2024-12-13 PROCEDURE — 88311 DECALCIFY TISSUE: CPT

## 2024-12-13 PROCEDURE — 73630 X-RAY EXAM OF FOOT: CPT | Mod: RT

## 2024-12-13 PROCEDURE — 88311 DECALCIFY TISSUE: CPT | Mod: 26

## 2024-12-13 PROCEDURE — 88304 TISSUE EXAM BY PATHOLOGIST: CPT | Mod: 26

## 2024-12-13 PROCEDURE — 28292 COR HLX VLGS RSC PRX PHLX BS: CPT | Mod: T5

## 2024-12-13 PROCEDURE — 88304 TISSUE EXAM BY PATHOLOGIST: CPT

## 2024-12-13 PROCEDURE — 73630 X-RAY EXAM OF FOOT: CPT | Mod: 26,RT

## 2024-12-13 RX ORDER — ONDANSETRON HYDROCHLORIDE 4 MG/1
4 TABLET, FILM COATED ORAL ONCE
Refills: 0 | Status: DISCONTINUED | OUTPATIENT
Start: 2024-12-13 | End: 2024-12-13

## 2024-12-13 RX ORDER — CEFADROXIL 500 MG/1
500 CAPSULE ORAL TWICE DAILY
Qty: 20 | Refills: 0 | Status: ACTIVE | COMMUNITY
Start: 2024-12-13 | End: 1900-01-01

## 2024-12-13 RX ORDER — ACETAMINOPHEN 500MG 500 MG/1
1000 TABLET, COATED ORAL ONCE
Refills: 0 | Status: DISCONTINUED | OUTPATIENT
Start: 2024-12-13 | End: 2024-12-13

## 2024-12-13 RX ORDER — OXYCODONE AND ACETAMINOPHEN 5; 325 MG/1; MG/1
5-325 TABLET ORAL
Qty: 36 | Refills: 0 | Status: ACTIVE | COMMUNITY
Start: 2024-12-13 | End: 1900-01-01

## 2024-12-13 RX ORDER — HYDROMORPHONE HYDROCHLORIDE 2 MG/1
0.5 TABLET ORAL
Refills: 0 | Status: DISCONTINUED | OUTPATIENT
Start: 2024-12-13 | End: 2024-12-13

## 2024-12-13 RX ORDER — ORAL SEMAGLUTIDE 7 MG/1
0 TABLET ORAL
Refills: 0 | DISCHARGE

## 2024-12-13 RX ORDER — OXYCODONE HYDROCHLORIDE 30 MG/1
5 TABLET ORAL ONCE
Refills: 0 | Status: DISCONTINUED | OUTPATIENT
Start: 2024-12-13 | End: 2024-12-13

## 2024-12-13 RX ORDER — 0.9 % SODIUM CHLORIDE 0.9 %
1000 INTRAVENOUS SOLUTION INTRAVENOUS
Refills: 0 | Status: DISCONTINUED | OUTPATIENT
Start: 2024-12-13 | End: 2024-12-13

## 2024-12-13 RX ORDER — ARIPIPRAZOLE 15 MG/1
1 TABLET ORAL
Refills: 0 | DISCHARGE

## 2024-12-13 RX ORDER — METHYLPHENIDATE HYDROCHLORIDE 27 MG/1
1 TABLET, EXTENDED RELEASE ORAL
Refills: 0 | DISCHARGE

## 2024-12-13 NOTE — CHART NOTE - NSCHARTNOTEFT_GEN_A_CORE
PACU ANESTHESIA ADMISSION NOTE      Procedure: Cheilectomy of right great toe      Post op diagnosis:      ____  Intubated  TV:______       Rate: ______      FiO2: ______    __x__  Patent Airway    __x__  Full return of protective reflexes    __x__  Full recovery from anesthesia / back to baseline     Vitals:   T:  97.1         R:  16                BP: 123/66                 Sat: 97                  P: 71      Mental Status:  __x__ Awake   ___x__ Alert   _____ Drowsy   _____ Sedated    Nausea/Vomiting:  _x___ NO  ______Yes,   See Post - Op Orders          Pain Scale (0-10):  _____    Treatment: __x__ None    ____ See Post - Op/PCA Orders    Post - Operative Fluids:   ____ Oral   ___x_ See Post - Op Orders    Plan: Discharge:   __x__Home       _____Floor     _____Critical Care    _____  Other:_________________    Comments:    Uneventful anesthesia. Patient transported to  spontaneously breathing and hemodynamically stable.

## 2024-12-13 NOTE — ASU DISCHARGE PLAN (ADULT/PEDIATRIC) - NS MD DC FALL RISK RISK
For information on Fall & Injury Prevention, visit: [email or web address] OR  [email or web address] OR  https://www.cdc.gov/steadi/patient.html

## 2024-12-13 NOTE — ASU DISCHARGE PLAN (ADULT/PEDIATRIC) - CARE PROVIDER_API CALL
William Kaiser  Podiatric Medicine and Surgery  [address], [address]st [address]  [place] [address]  Phone: [phone]  Fax: [phone]  Follow Up Time:

## 2024-12-13 NOTE — BRIEF OPERATIVE NOTE - COMMENTS
Stable for discharge home.  WBAT w/ surgical shoe to RLE.  Follow up as outpatient for suture removal in 2 weeks.  Meds sent to patients pharmacy.

## 2024-12-13 NOTE — ASU DISCHARGE PLAN (ADULT/PEDIATRIC) - FINANCIAL ASSISTANCE
[facility] [employer] provides services at a reduced cost to those who are determined to be eligible through [facility] [employer]’s financial assistance program. Information regarding [employer]’s financial assistance program can be found by going to [email or web address] or by calling [phone].

## 2024-12-16 LAB — SURGICAL PATHOLOGY STUDY: SIGNIFICANT CHANGE UP

## 2024-12-17 ENCOUNTER — APPOINTMENT (OUTPATIENT)
Dept: PODIATRY | Facility: CLINIC | Age: 44
End: 2024-12-17
Payer: MEDICAID

## 2024-12-17 ENCOUNTER — OUTPATIENT (OUTPATIENT)
Dept: OUTPATIENT SERVICES | Facility: HOSPITAL | Age: 44
LOS: 1 days | End: 2024-12-17
Payer: MEDICAID

## 2024-12-17 DIAGNOSIS — M19.90 UNSPECIFIED OSTEOARTHRITIS, UNSPECIFIED SITE: ICD-10-CM

## 2024-12-17 DIAGNOSIS — Z00.00 ENCOUNTER FOR GENERAL ADULT MEDICAL EXAMINATION WITHOUT ABNORMAL FINDINGS: ICD-10-CM

## 2024-12-17 DIAGNOSIS — F17.290 NICOTINE DEPENDENCE, OTHER TOBACCO PRODUCT, UNCOMPLICATED: ICD-10-CM

## 2024-12-17 DIAGNOSIS — M20.5X1 OTHER DEFORMITIES OF TOE(S) (ACQUIRED), RIGHT FOOT: ICD-10-CM

## 2024-12-17 DIAGNOSIS — Z33.2 ENCOUNTER FOR ELECTIVE TERMINATION OF PREGNANCY: Chronic | ICD-10-CM

## 2024-12-17 DIAGNOSIS — F90.9 ATTENTION-DEFICIT HYPERACTIVITY DISORDER, UNSPECIFIED TYPE: ICD-10-CM

## 2024-12-17 DIAGNOSIS — Z98.890 OTHER SPECIFIED POSTPROCEDURAL STATES: Chronic | ICD-10-CM

## 2024-12-17 DIAGNOSIS — Z98.891 HISTORY OF UTERINE SCAR FROM PREVIOUS SURGERY: Chronic | ICD-10-CM

## 2024-12-17 DIAGNOSIS — M25.774 OSTEOPHYTE, RIGHT FOOT: ICD-10-CM

## 2024-12-17 PROBLEM — S62.101A FRACTURE OF UNSPECIFIED CARPAL BONE, RIGHT WRIST, INITIAL ENCOUNTER FOR CLOSED FRACTURE: Chronic | Status: ACTIVE | Noted: 2024-12-13

## 2024-12-17 PROCEDURE — 99213 OFFICE O/P EST LOW 20 MIN: CPT | Mod: 24

## 2024-12-17 PROCEDURE — 99213 OFFICE O/P EST LOW 20 MIN: CPT

## 2024-12-20 ENCOUNTER — OUTPATIENT (OUTPATIENT)
Dept: OUTPATIENT SERVICES | Facility: HOSPITAL | Age: 44
LOS: 1 days | End: 2024-12-20
Payer: MEDICAID

## 2024-12-20 ENCOUNTER — EMERGENCY (EMERGENCY)
Facility: HOSPITAL | Age: 44
LOS: 0 days | Discharge: HOME | End: 2024-12-20
Attending: EMERGENCY MEDICINE
Payer: MEDICAID

## 2024-12-20 ENCOUNTER — APPOINTMENT (OUTPATIENT)
Dept: PODIATRY | Facility: CLINIC | Age: 44
End: 2024-12-20
Payer: MEDICAID

## 2024-12-20 VITALS
HEART RATE: 83 BPM | OXYGEN SATURATION: 97 % | RESPIRATION RATE: 20 BRPM | HEIGHT: 63 IN | WEIGHT: 186.95 LBS | TEMPERATURE: 98 F

## 2024-12-20 DIAGNOSIS — Z00.00 ENCOUNTER FOR GENERAL ADULT MEDICAL EXAMINATION WITHOUT ABNORMAL FINDINGS: ICD-10-CM

## 2024-12-20 DIAGNOSIS — R05.1 ACUTE COUGH: ICD-10-CM

## 2024-12-20 DIAGNOSIS — Z98.891 HISTORY OF UTERINE SCAR FROM PREVIOUS SURGERY: Chronic | ICD-10-CM

## 2024-12-20 DIAGNOSIS — R11.10 VOMITING, UNSPECIFIED: ICD-10-CM

## 2024-12-20 DIAGNOSIS — Z33.2 ENCOUNTER FOR ELECTIVE TERMINATION OF PREGNANCY: Chronic | ICD-10-CM

## 2024-12-20 DIAGNOSIS — R53.1 WEAKNESS: ICD-10-CM

## 2024-12-20 DIAGNOSIS — F17.290 NICOTINE DEPENDENCE, OTHER TOBACCO PRODUCT, UNCOMPLICATED: ICD-10-CM

## 2024-12-20 DIAGNOSIS — Z98.890 OTHER SPECIFIED POSTPROCEDURAL STATES: Chronic | ICD-10-CM

## 2024-12-20 DIAGNOSIS — F90.9 ATTENTION-DEFICIT HYPERACTIVITY DISORDER, UNSPECIFIED TYPE: ICD-10-CM

## 2024-12-20 DIAGNOSIS — J06.9 ACUTE UPPER RESPIRATORY INFECTION, UNSPECIFIED: ICD-10-CM

## 2024-12-20 PROCEDURE — 71046 X-RAY EXAM CHEST 2 VIEWS: CPT | Mod: 26

## 2024-12-20 PROCEDURE — 99283 EMERGENCY DEPT VISIT LOW MDM: CPT | Mod: 25

## 2024-12-20 PROCEDURE — 96372 THER/PROPH/DIAG INJ SC/IM: CPT

## 2024-12-20 PROCEDURE — 99284 EMERGENCY DEPT VISIT MOD MDM: CPT

## 2024-12-20 PROCEDURE — 71046 X-RAY EXAM CHEST 2 VIEWS: CPT

## 2024-12-20 PROCEDURE — 99024 POSTOP FOLLOW-UP VISIT: CPT

## 2024-12-20 RX ORDER — BENZONATATE 100 MG/1
1 CAPSULE ORAL
Qty: 21 | Refills: 0
Start: 2024-12-20 | End: 2024-12-26

## 2024-12-20 RX ORDER — DEXAMETHASONE 1.5 MG/1
10 TABLET ORAL ONCE
Refills: 0 | Status: COMPLETED | OUTPATIENT
Start: 2024-12-20 | End: 2024-12-20

## 2024-12-20 RX ORDER — ACETAMINOPHEN, DEXTROMETHORPHAN HBR 160; 5 MG/5ML; MG/5ML
20 SYRUP ORAL
Qty: 140 | Refills: 0
Start: 2024-12-20 | End: 2024-12-26

## 2024-12-20 RX ORDER — ACETAMINOPHEN 500MG 500 MG/1
975 TABLET, COATED ORAL ONCE
Refills: 0 | Status: COMPLETED | OUTPATIENT
Start: 2024-12-20 | End: 2024-12-20

## 2024-12-20 RX ORDER — PROMETHAZINE HYDROCHLORIDE AND DEXTROMETHORPHAN HYDROBROMIDE 15; 6.25 MG/5ML; MG/5ML
5 SOLUTION ORAL
Qty: 100 | Refills: 0
Start: 2024-12-20 | End: 2024-12-24

## 2024-12-20 RX ADMIN — ACETAMINOPHEN 500MG 975 MILLIGRAM(S): 500 TABLET, COATED ORAL at 17:57

## 2024-12-20 RX ADMIN — DEXAMETHASONE 10 MILLIGRAM(S): 1.5 TABLET ORAL at 17:58

## 2024-12-20 NOTE — ED PROVIDER NOTE - PHYSICAL EXAMINATION
As Follows:  CONST: Well appearing in NAD  EYES: PERRL, EOMI, Sclera and conjunctiva clear.   ENT: No nasal discharge. Oropharynx normal appearing, no erythema / exudates. Uvula midline. Airway intact.   CARD: No murmurs, rubs, or gallops; Normal rate and rhythm  RESP: BS Equal B/L, No wheezes, rhonchi or rales. No distress or accessory breathing  GI: Soft, non-tender, non-distended.  SKIN: Warm, dry, no acute rashes. MMM  NEURO: Alert and Oriented, No focal deficits. Strength and sensation intact. Steady gait

## 2024-12-20 NOTE — ED PROVIDER NOTE - DIFFERENTIAL DIAGNOSIS
The differential diagnosis for patients clinical presentation includes but is not limited to:  Pneumonia, UTI, meningitis, viral syndrome, cellulitis, HEENT infection, encephalitis Differential Diagnosis

## 2024-12-20 NOTE — ED PROVIDER NOTE - NSPTACCESSSVCSAPPTDETAILS_ED_ALL_ED_FT
please refer for evaluation and follow up xray, possible tiny new pleural effusions per radiology read    please also refer to primary care for same

## 2024-12-20 NOTE — ED PROVIDER NOTE - CLINICAL SUMMARY MEDICAL DECISION MAKING FREE TEXT BOX
44-year-old female past medical history of bipolar disorder, ADHD, vaping regularly presents to the emergency department for cough and weakness that started this morning.  Patient reports she gets this every year and responds to cough medication and dose of steroids.  Patient daughter is sick and in the hospital is upper respiratory symptoms.  Patient reports having mild nausea but is otherwise eating and drinking normally.  No urinary symptoms.  On exam, vital signs reviewed.  Patient's rhinorrhea.  Patient is dry cough.  Patient is clear lungs bilaterally.  Chest x-ray performed and negative for any infection.  Will discharge with cough medication and appropriate supportive care.    Full DC instructions discussed and patient knows when to seek immediate medical attention.  Patient has proper follow up.  All results discussed and patient aware they may require further work up.  Proper follow up ensured. Limitations of ED work up discussed.  Medications administered and prescribed/OTC home meds discussed.  All questions and concerns from patient or family addressed. Understanding of instructions verbalized.

## 2024-12-20 NOTE — ED PROVIDER NOTE - OBJECTIVE STATEMENT
Patient is a 44-year-old female with past medical history of bipolar, ADHD presents for evaluation of cough, weakness since 7 in a.m. today.  She states she had an episode of nonbloody vomiting today.  Her daughter is currently sick and ill with similar symptoms.  She denies any abdominal pain, urinary complaints,

## 2024-12-20 NOTE — ED ADULT NURSE NOTE - CINV DISCH TEACH PARTICIP
NOTIFICATION RETURN TO WORK / SCHOOL 
 
8/1/2018 5:59 PM 
 
Mr. Aguilar Thompson 
[address] [place] [id number] To Whom It May Concern: 
 
Aguilar Thompson is currently under the care of [facility]. He will return to work 08/07/2018 If there are questions or concerns please have the patient contact our office. Sincerely, Osmel Kaufman NP 
 
                                
 
 Patient

## 2024-12-20 NOTE — ED PROVIDER NOTE - ATTENDING APP SHARED VISIT CONTRIBUTION OF CARE
I personally evaluated patient. I agree with the findings and plan with all documentation on chart except as documented  in my note.    44-year-old female past medical history of bipolar disorder, ADHD, vaping regularly presents to the emergency department for cough and weakness that started this morning.  Patient reports she gets this every year and responds to cough medication and dose of steroids.  Patient daughter is sick and in the hospital is upper respiratory symptoms.  Patient reports having mild nausea but is otherwise eating and drinking normally.  No urinary symptoms.  On exam, vital signs reviewed.  Patient's rhinorrhea.  Patient is dry cough.  Patient is clear lungs bilaterally.  Chest x-ray performed and negative for any infection.  Will discharge with cough medication and appropriate supportive care.    Full DC instructions discussed and patient knows when to seek immediate medical attention.  Patient has proper follow up.  All results discussed and patient aware they may require further work up.  Proper follow up ensured. Limitations of ED work up discussed.  Medications administered and prescribed/OTC home meds discussed.  All questions and concerns from patient or family addressed. Understanding of instructions verbalized.

## 2024-12-20 NOTE — ED ADULT NURSE NOTE - NSFALLUNIVINTERV_ED_ALL_ED
Bed/Stretcher in lowest position, wheels locked, appropriate side rails in place/Call bell, personal items and telephone in reach/Instruct patient to call for assistance before getting out of bed/chair/stretcher/Non-slip footwear applied when patient is off stretcher/[place] to call system/Physically safe environment - no spills, clutter or unnecessary equipment/Purposeful proactive rounding/Room/bathroom lighting operational, light cord in reach

## 2024-12-20 NOTE — ED PROVIDER NOTE - PATIENT PORTAL LINK FT
You can access the FollowMyHealth Patient Portal offered by [facility] by registering at the following website: [email or web address]. By joining [employer]’s FollowMyHealth portal, you will also be able to view your health information using other applications (apps) compatible with our system.

## 2024-12-23 ENCOUNTER — OUTPATIENT (OUTPATIENT)
Dept: OUTPATIENT SERVICES | Facility: HOSPITAL | Age: 44
LOS: 1 days | End: 2024-12-23
Payer: MEDICAID

## 2024-12-23 ENCOUNTER — APPOINTMENT (OUTPATIENT)
Dept: PODIATRY | Facility: CLINIC | Age: 44
End: 2024-12-23
Payer: MEDICAID

## 2024-12-23 DIAGNOSIS — M21.611 BUNION OF RIGHT FOOT: ICD-10-CM

## 2024-12-23 DIAGNOSIS — Z33.2 ENCOUNTER FOR ELECTIVE TERMINATION OF PREGNANCY: Chronic | ICD-10-CM

## 2024-12-23 DIAGNOSIS — Z00.00 ENCOUNTER FOR GENERAL ADULT MEDICAL EXAMINATION WITHOUT ABNORMAL FINDINGS: ICD-10-CM

## 2024-12-23 DIAGNOSIS — Z98.890 OTHER SPECIFIED POSTPROCEDURAL STATES: Chronic | ICD-10-CM

## 2024-12-23 DIAGNOSIS — M21.612 BUNION OF RIGHT FOOT: ICD-10-CM

## 2024-12-23 PROCEDURE — 99024 POSTOP FOLLOW-UP VISIT: CPT

## 2024-12-24 DIAGNOSIS — M79.671 PAIN IN RIGHT FOOT: ICD-10-CM
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
## 2024-12-24 DIAGNOSIS — L03.90 CELLULITIS, UNSPECIFIED: ICD-10-CM
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
## 2024-12-24 DIAGNOSIS — T81.30XA DISRUPTION OF WOUND, UNSPECIFIED, INITIAL ENCOUNTER: ICD-10-CM

## 2024-12-27 ENCOUNTER — APPOINTMENT (OUTPATIENT)
Dept: PODIATRY | Facility: CLINIC | Age: 44
End: 2024-12-27

## 2024-12-27 ENCOUNTER — NON-APPOINTMENT (OUTPATIENT)
Age: 44
End: 2024-12-27

## 2024-12-27 RX ORDER — OXYCODONE AND ACETAMINOPHEN 10; 325 MG/1; MG/1
10-325 TABLET ORAL
Qty: 28 | Refills: 0 | Status: ACTIVE | COMMUNITY
Start: 2024-12-17

## 2024-12-30 ENCOUNTER — APPOINTMENT (OUTPATIENT)
Dept: PODIATRY | Facility: CLINIC | Age: 44
End: 2024-12-30
Payer: MEDICAID

## 2024-12-30 ENCOUNTER — OUTPATIENT (OUTPATIENT)
Dept: OUTPATIENT SERVICES | Facility: HOSPITAL | Age: 44
LOS: 1 days | End: 2024-12-30
Payer: MEDICAID

## 2024-12-30 DIAGNOSIS — M79.671 PAIN IN RIGHT FOOT: ICD-10-CM

## 2024-12-30 DIAGNOSIS — Z98.890 OTHER SPECIFIED POSTPROCEDURAL STATES: Chronic | ICD-10-CM

## 2024-12-30 DIAGNOSIS — Z98.891 HISTORY OF UTERINE SCAR FROM PREVIOUS SURGERY: Chronic | ICD-10-CM

## 2024-12-30 DIAGNOSIS — Z33.2 ENCOUNTER FOR ELECTIVE TERMINATION OF PREGNANCY: Chronic | ICD-10-CM

## 2024-12-30 DIAGNOSIS — Z00.00 ENCOUNTER FOR GENERAL ADULT MEDICAL EXAMINATION WITHOUT ABNORMAL FINDINGS: ICD-10-CM
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
## 2024-12-30 DIAGNOSIS — M21.611 BUNION OF RIGHT FOOT: ICD-10-CM
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
## 2024-12-30 PROCEDURE — 99212 OFFICE O/P EST SF 10 MIN: CPT | Mod: 24

## 2024-12-30 PROCEDURE — 99212 OFFICE O/P EST SF 10 MIN: CPT

## 2025-01-02 DIAGNOSIS — M21.611 BUNION OF RIGHT FOOT: ICD-10-CM
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
## 2025-01-03 ENCOUNTER — NON-APPOINTMENT (OUTPATIENT)
Age: 45
End: 2025-01-03

## 2025-01-03 DIAGNOSIS — M79.672 PAIN IN LEFT FOOT: ICD-10-CM
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
## 2025-01-03 DIAGNOSIS — M21.612 BUNION OF LEFT FOOT: ICD-10-CM

## 2025-01-03 DIAGNOSIS — T81.30XA DISRUPTION OF WOUND, UNSPECIFIED, INITIAL ENCOUNTER: ICD-10-CM

## 2025-02-06 ENCOUNTER — OUTPATIENT (OUTPATIENT)
Dept: OUTPATIENT SERVICES | Facility: HOSPITAL | Age: 45
LOS: 1 days | End: 2025-02-06
Payer: MEDICAID

## 2025-02-06 ENCOUNTER — APPOINTMENT (OUTPATIENT)
Dept: PODIATRY | Facility: CLINIC | Age: 45
End: 2025-02-06
Payer: MEDICAID

## 2025-02-06 DIAGNOSIS — Z33.2 ENCOUNTER FOR ELECTIVE TERMINATION OF PREGNANCY: Chronic | ICD-10-CM

## 2025-02-06 DIAGNOSIS — Z98.891 HISTORY OF UTERINE SCAR FROM PREVIOUS SURGERY: Chronic | ICD-10-CM

## 2025-02-06 DIAGNOSIS — Z00.00 ENCOUNTER FOR GENERAL ADULT MEDICAL EXAMINATION WITHOUT ABNORMAL FINDINGS: ICD-10-CM

## 2025-02-06 DIAGNOSIS — Z98.890 OTHER SPECIFIED POSTPROCEDURAL STATES: Chronic | ICD-10-CM

## 2025-02-06 PROCEDURE — 99204 OFFICE O/P NEW MOD 45 MIN: CPT

## 2025-02-06 PROCEDURE — 99024 POSTOP FOLLOW-UP VISIT: CPT

## 2025-02-13 ENCOUNTER — APPOINTMENT (OUTPATIENT)
Dept: PODIATRY | Facility: CLINIC | Age: 45
End: 2025-02-13

## 2025-02-13 DIAGNOSIS — M79.671 PAIN IN RIGHT FOOT: ICD-10-CM
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
## 2025-02-13 DIAGNOSIS — M21.611 BUNION OF RIGHT FOOT: ICD-10-CM

## 2025-02-20 ENCOUNTER — EMERGENCY (EMERGENCY)
Facility: HOSPITAL | Age: 45
LOS: 0 days | Discharge: HOME | End: 2025-02-20
Attending: EMERGENCY MEDICINE
Payer: MEDICAID

## 2025-02-20 VITALS
HEART RATE: 80 BPM | SYSTOLIC BLOOD PRESSURE: 122 MMHG | TEMPERATURE: 98 F | OXYGEN SATURATION: 96 % | DIASTOLIC BLOOD PRESSURE: 85 MMHG | WEIGHT: 171.96 LBS | HEIGHT: 63 IN | RESPIRATION RATE: 20 BRPM

## 2025-02-20 DIAGNOSIS — Z87.891 PERSONAL HISTORY OF NICOTINE DEPENDENCE: ICD-10-CM

## 2025-02-20 DIAGNOSIS — H00.013 HORDEOLUM EXTERNUM RIGHT EYE, UNSPECIFIED EYELID: ICD-10-CM

## 2025-02-20 DIAGNOSIS — R19.7 DIARRHEA, UNSPECIFIED: ICD-10-CM

## 2025-02-20 DIAGNOSIS — Z98.891 HISTORY OF UTERINE SCAR FROM PREVIOUS SURGERY: Chronic | ICD-10-CM

## 2025-02-20 DIAGNOSIS — Z33.2 ENCOUNTER FOR ELECTIVE TERMINATION OF PREGNANCY: Chronic | ICD-10-CM

## 2025-02-20 DIAGNOSIS — R00.2 PALPITATIONS: ICD-10-CM

## 2025-02-20 DIAGNOSIS — Z98.890 OTHER SPECIFIED POSTPROCEDURAL STATES: Chronic | ICD-10-CM

## 2025-02-20 DIAGNOSIS — F11.93 OPIOID USE, UNSPECIFIED WITH WITHDRAWAL: ICD-10-CM

## 2025-02-20 DIAGNOSIS — R11.0 NAUSEA: ICD-10-CM

## 2025-02-20 DIAGNOSIS — F90.9 ATTENTION-DEFICIT HYPERACTIVITY DISORDER, UNSPECIFIED TYPE: ICD-10-CM

## 2025-02-20 PROCEDURE — 99283 EMERGENCY DEPT VISIT LOW MDM: CPT

## 2025-02-20 PROCEDURE — 99284 EMERGENCY DEPT VISIT MOD MDM: CPT

## 2025-02-20 RX ORDER — BUPRENORPHINE AND NALOXONE 8; 2 MG/1; MG/1
1 FILM BUCCAL; SUBLINGUAL ONCE
Refills: 0 | Status: DISCONTINUED | OUTPATIENT
Start: 2025-02-20 | End: 2025-02-20

## 2025-02-20 RX ORDER — ERYTHROMYCIN BASE 5 MG/GRAM
1 OINTMENT (GRAM) OPHTHALMIC (EYE)
Qty: 1 | Refills: 1
Start: 2025-02-20

## 2025-02-20 RX ORDER — BUPRENORPHINE AND NALOXONE 8; 2 MG/1; MG/1
1 FILM BUCCAL; SUBLINGUAL ONCE
Refills: 0 | Status: COMPLETED | OUTPATIENT
Start: 2025-02-20 | End: 2025-02-20

## 2025-02-20 RX ORDER — ONDANSETRON 4 MG/1
1 TABLET, ORALLY DISINTEGRATING ORAL
Qty: 1 | Refills: 0
Start: 2025-02-20

## 2025-02-20 RX ORDER — ONDANSETRON 4 MG/1
4 TABLET, ORALLY DISINTEGRATING ORAL ONCE
Refills: 0 | Status: COMPLETED | OUTPATIENT
Start: 2025-02-20 | End: 2025-02-20

## 2025-02-20 RX ORDER — BUPRENORPHINE AND NALOXONE 8; 2 MG/1; MG/1
1 FILM BUCCAL; SUBLINGUAL
Qty: 1 | Refills: 0
Start: 2025-02-20

## 2025-02-20 RX ADMIN — ONDANSETRON 4 MILLIGRAM(S): 4 TABLET, ORALLY DISINTEGRATING ORAL at 16:16

## 2025-02-20 RX ADMIN — BUPRENORPHINE AND NALOXONE 1 TABLET(S): 8; 2 FILM BUCCAL; SUBLINGUAL at 16:16

## 2025-02-20 NOTE — ED PROVIDER NOTE - PHYSICAL EXAMINATION
CONSTITUTIONAL: Awake, alert, anxious appearing  SKIN: Warm, dry  HEAD: NCAT  EYES: Clear conjunctiva, visible stye to right lower eye   ENT: MMM  NECK: Supple  CARD: RRR, S1, S2; no M/R/G  RESP: Normal respiratory effort, CTAB  ABD: Soft, nontender. No rebound, rigidity, or guarding  EXT: Pulses palpable distally  NEURO: Grossly intact. Awake, alert, moving all extremities, no facial asymmetry.

## 2025-02-20 NOTE — ED PROVIDER NOTE - ATTENDING CONTRIBUTION TO CARE
chronic opiate use now stopped in last 3 days with COWS score of 8. plan is to provide supprotive care and intiate suboxone therapy.

## 2025-02-20 NOTE — ED PROVIDER NOTE - NSFOLLOWUPINSTRUCTIONS_ED_ALL_ED_FT
Sometimes after experiencing life stressors, you may have symptoms that include sadness, hopelessness, helplessness, restlessness, fatigue, irritability, and/or difficulty concentrating. This may interfere with life functions, including relationships, work, and school.    If these symptoms become persistent and disruptive to your everyday life, they may be due to depression, anxiety, or another mental illness. These illnesses can be addressed with medications and therapy.    Medications sometimes take a while to start working. Plus, it sometimes takes a few tries to find the right medication or combination of medication If you were started on a medication, make sure to take exactly as prescribed and follow up with a psychiatrist. In addition to medicine, psychotherapy (counseling) can help. This involves meeting with a therapist to talk about your feelings, thoughts, and life. There are different types of psychotherapy. In general, they all focus on helping you learn new ways of thinking and behaving, so you can better cope with your life stressors.    [facility]: [phone]-[facility] ([phone])  At any hour of any day, in almost any language, from phone, tablet or computer, [facility] is your connection to get the help you need: peer support and short-term counseling via telephone, text and web.    SEEK IMMEDIATE MEDICAL CARE IF YOU HAVE ANY OF THE FOLLOWING SYMPTOMS: thoughts about hurting killing yourself, thoughts about hurting or killing somebody else, hallucinations, or worsening depression.

## 2025-02-20 NOTE — ED PROVIDER NOTE - OBJECTIVE STATEMENT
44yoF PMHx recent foot surgery, ADHD, prescribed post-op oxycodone presenting requesting suboxone because she feels like she is withdrawing. Reports nausea, diarrhea, anxiety, palpitations,  and sweating for the past few days. She was previously on suboxone in the past after a surgery when she switched from taking oxycodone to taking suboxone. Pt also requests refill for erythromycin ointment for her right eye stye which she admits she has been itching and irritating. Denies any chest pain, sob

## 2025-02-20 NOTE — ED PROVIDER NOTE - PROGRESS NOTE DETAILS
CHRISTY-- on reeval pt reports feeling better, agrees with plan for dc with outpt follow up, info placed for SBIRT consult

## 2025-02-20 NOTE — ED ADULT NURSE NOTE - NSFALLUNIVINTERV_ED_ALL_ED
Bed/Stretcher in lowest position, wheels locked, appropriate side rails in place/Call bell, personal items and telephone in reach/Instruct patient to call for assistance before getting out of bed/chair/stretcher/Non-slip footwear applied when patient is off stretcher/[place] to call system/Physically safe environment - no spills, clutter or unnecessary equipment/Purposeful proactive rounding/Room/bathroom lighting operational, light cord in reach Spironolactone Counseling: Patient advised regarding risks of diarrhea, abdominal pain, hyperkalemia, birth defects (for female patients), liver toxicity and renal toxicity. The patient may need blood work to monitor liver and kidney function and potassium levels while on therapy. The patient verbalized understanding of the proper use and possible adverse effects of spironolactone.  All of the patient's questions and concerns were addressed.

## 2025-02-22 ENCOUNTER — EMERGENCY (EMERGENCY)
Facility: HOSPITAL | Age: 45
LOS: 0 days | Discharge: HOME | End: 2025-02-22
Attending: EMERGENCY MEDICINE
Payer: MEDICAID

## 2025-02-22 VITALS
OXYGEN SATURATION: 98 % | HEART RATE: 85 BPM | WEIGHT: 171.96 LBS | RESPIRATION RATE: 18 BRPM | TEMPERATURE: 98 F | HEIGHT: 63 IN

## 2025-02-22 DIAGNOSIS — M19.90 UNSPECIFIED OSTEOARTHRITIS, UNSPECIFIED SITE: ICD-10-CM

## 2025-02-22 DIAGNOSIS — Z98.891 HISTORY OF UTERINE SCAR FROM PREVIOUS SURGERY: Chronic | ICD-10-CM

## 2025-02-22 DIAGNOSIS — F31.9 BIPOLAR DISORDER, UNSPECIFIED: ICD-10-CM

## 2025-02-22 DIAGNOSIS — Z33.2 ENCOUNTER FOR ELECTIVE TERMINATION OF PREGNANCY: Chronic | ICD-10-CM

## 2025-02-22 DIAGNOSIS — F11.20 OPIOID DEPENDENCE, UNCOMPLICATED: ICD-10-CM

## 2025-02-22 DIAGNOSIS — Z98.890 OTHER SPECIFIED POSTPROCEDURAL STATES: Chronic | ICD-10-CM

## 2025-02-22 DIAGNOSIS — F90.9 ATTENTION-DEFICIT HYPERACTIVITY DISORDER, UNSPECIFIED TYPE: ICD-10-CM

## 2025-02-22 PROCEDURE — 99284 EMERGENCY DEPT VISIT MOD MDM: CPT

## 2025-02-22 PROCEDURE — 99283 EMERGENCY DEPT VISIT LOW MDM: CPT

## 2025-02-22 RX ORDER — BUPRENORPHINE AND NALOXONE 8; 2 MG/1; MG/1
1 FILM BUCCAL; SUBLINGUAL ONCE
Refills: 0 | Status: DISCONTINUED | OUTPATIENT
Start: 2025-02-22 | End: 2025-02-22

## 2025-02-22 RX ADMIN — BUPRENORPHINE AND NALOXONE 1 TABLET(S): 8; 2 FILM BUCCAL; SUBLINGUAL at 06:30

## 2025-02-22 NOTE — ED ADULT NURSE NOTE - NSFALLUNIVINTERV_ED_ALL_ED
Bed/Stretcher in lowest position, wheels locked, appropriate side rails in place/Call bell, personal items and telephone in reach/Instruct patient to call for assistance before getting out of bed/chair/stretcher/Non-slip footwear applied when patient is off stretcher/[place] to call system/Physically safe environment - no spills, clutter or unnecessary equipment/Purposeful proactive rounding/Room/bathroom lighting operational, light cord in reach

## 2025-02-22 NOTE — ED PROVIDER NOTE - NSFOLLOWUPINSTRUCTIONS_ED_ALL_ED_FT
Buprenorphine/Naloxone (Into the mouth)  Buprenorphine (bue-pre-NOR-feen), Naloxone (nal-OX-one)    Treats narcotic dependence.    Brand Name(s):Suboxone,Zubsolv  There may be other brand names for this medicine.    When This Medicine Should Not Be Used:  This medicine is not right for everyone. Do not use it if you had an allergic reaction to buprenorphine or naloxone.    How to Use This Medicine:  Thin Sheet, Tablet    Take your medicine as directed. Your dose may need to be changed several times to find what works best for you.  You must let the medicine dissolve. Never swallow the film or tablet. Your body may not absorb enough of the medicine if you swallow it.  Your health caregiver should show you how to use the medicine. If you do not understand, ask for help. It is important to use the medicine correctly.  Do not talk while the medicine is inside your mouth.  Buccal film: Rinse your mouth with water to moisten it. Place the film against the inside of your cheek. If your doctor told you to use more than 1 film, place the second film inside your other cheek. Do not place more than 2 films inside of 1 cheek at a time. Do not move or touch the film. After the film has completely dissolved, take a sip of water, swish gently around your teeth and gums, and swallow. Wait at least one hour before brushing your teeth.  Sublingual tablet: Place the tablet under your tongue. If your doctor told you to use more than 1 tablet, place all of the tablets in different places under your tongue at the same time. You can use 2 tablets at a time until you have taken all of the medicine, if that is easier for you. Let the tablets dissolve completely in your mouth. Do not eat or drink anything until the tablets are completely dissolved. Rinse your mouth with water and swallow. Wait for at least one hour before brushing your teeth.  Sublingual film: Drink some water to help moisten your mouth. Place the film under your tongue. If your doctor told you to use more than 1 film, place the second film on the opposite side from the first one. Do not move the film after you placed it under your tongue. If you are supposed to use more than 2 films, use them the same way, but do not start until the first 2 films are completely dissolved. After the film has completely dissolved, take a sip of water, swish gently around your teeth and gums, and swallow. Wait at least one hour before brushing your teeth.  Do not break, crush, chew, or cut the film or tablet.  This medicine should come with a Medication Guide. Ask your pharmacist for a copy if you do not have one.  Missed dose: Take a dose as soon as you remember. If it is almost time for your next dose, wait until then and take a regular dose. Do not take extra medicine to make up for a missed dose.  Store the medicine in a closed container at room temperature, away from heat, moisture, and direct light. Drop off any unused narcotic medicine at a drug take-back location right away. If you do not have a drug take-back location near you, flush any unused narcotic medicine down the toilet. Check your local drug store and clinics for take-back locations. You can also check the [employer] web site for locations. Here is the link to the FDA safe disposal of medicines website:www.fda.gov/drugs/resourcesforyou/consumers/buyingusingmedicinesafely/ensuringsafeuseofmedicine/safedisposalofmedicines/xjd902703.htm  Drugs and Foods to Avoid:  Ask your doctor or pharmacist before using any other medicine, including over-the-counter medicines, vitamins, and herbal products.    Do not use this medicine if you are using or have used an MAO inhibitor within the past 14 days.  Some medicines can affect how buprenorphine/naloxone works. Tell your doctor if you are using the following:  Carbamazepine, cyclobenzaprine, erythromycin, ketoconazole, metaxalone, mirtazapine, phenobarbital, phenytoin, rifampin, tramadol, trazodone  Diuretic (water pill)  Medicine to treat depression or mental health problems (including SNRIs, SSRIs, TCAs)  Medicine to treat HIV/AIDS (including atazanavir, delavirdine, efavirenz, etravirine, nevirapine, ritonavir)  Phenothiazine medicine  Triptan medicine to treat migraine headaches  Do not drink alcohol while you are using this medicine.  Tell your doctor if you use anything else that makes you sleepy. Some examples are allergy medicine, narcotic pain medicine, and alcohol. Tell your doctor if you are also using butorphanol, nalbuphine, pentazocine, or a muscle relaxer.  Warnings While Using This Medicine:    Tell your doctor if you are pregnant or breastfeeding, or if you have kidney disease, liver disease (including hepatitis), lung or breathing problems (including sleep apnea), tooth problems (including history of cavities), adrenal gland problems, an enlarged prostate, trouble urinating, gallbladder problems, thyroid problems, stomach problems, or a history of depression. Tell your doctor if you have heart disease, congestive heart failure, a slow heartbeat, or a history of heart rhythm problems (including long QT syndrome). Tell your doctor if you have a brain tumor, head injury, or alcohol or drug abuse.  This medicine may cause the following problems:  High risk of overdose, which can lead to death  Respiratory depression (serious breathing problem that can be life-threatening)  Adrenal gland problems  Sleep-related breathing problems (including sleep apnea, sleep-related hypoxemia)  Low blood pressure  Liver problems  QT prolongation (heart rhythm problem)  Tooth problems (including tooth fracture, tooth loss)  Serotonin syndrome, when used with certain medicines  This medicine may make you dizzy or drowsy. Do not drive or do anything else that could be dangerous until you know how this medicine affects you. Sit or lie down if you feel dizzy. Stand up carefully.  Tell any doctor or dentist who treats you that you are using this medicine. Schedule regular dental checkups while taking this medicine.  This medicine can be habit-forming. Do not use more than your prescribed dose. Call your doctor if you think your medicine is not working.  This medicine may cause constipation, especially with long-term use. Ask your doctor if you should use a laxative to prevent and treat constipation.  Do not stop using this medicine suddenly. Your doctor will need to slowly decrease your dose before you stop it completely.  This medicine could cause infertility. Talk with your doctor before using this medicine if you plan to have children.  Your doctor will do lab tests at regular visits to check on the effects of this medicine. Keep all appointments.  Keep all medicine out of the reach of children. Never share your medicine with anyone.  Possible Side Effects While Using This Medicine:  Call your doctor right away if you notice any of these side effects:    Allergic reaction: Itching or hives, swelling in your face or hands, swelling or tingling in your mouth or throat, chest tightness, trouble breathing  Anxiety, restlessness, fever, muscle spasms, twitching, diarrhea, seeing or hearing things that are not there  Blue lips, fingernails, or skin, trouble breathing  Changes in skin color, dark freckles, cold feeling, tiredness, weight loss  Dark urine or pale stools, nausea, vomiting, loss of appetite, stomach pain, yellow skin or eyes  Extreme dizziness, drowsiness, or weakness, slow heartbeat, sweating, seizures, cold or clammy skin  Fast, pounding, or uneven heartbeat  Severe confusion, lightheadedness, dizziness, or fainting  Shaking, trembling, hunger  Trouble breathing or slow breathing  Toothache  If you notice these less serious side effects, talk with your doctor:    Constipation, diarrhea, nausea, vomiting, stomach upset  Headache  Stuffy or runny nose  If you notice other side effects that you think are caused by this medicine, tell your doctor.  Call your doctor for medical advice about side effects. You may report side effects to FDA at 8-465-ORN-4640

## 2025-02-22 NOTE — ED PROVIDER NOTE - PHYSICAL EXAMINATION
VITAL SIGNS: I have reviewed nursing notes and confirm.  CONSTITUTIONAL: Non-toxic, in NAD  SKIN: Warm dry, normal skin turgor  CARD: RRR, no murmurs, rubs or gallops  RESP: Clear to ausculation bilaterally.   ABD: Soft, non-distended, non-tender, no rebound or guarding.  EXT: Full ROM

## 2025-02-22 NOTE — ED PROVIDER NOTE - PATIENT PORTAL LINK FT
You can access the FollowMyHealth Patient Portal offered by [facility] by registering at the following website: [email or web address]. By joining [employer]’s FollowMyHealth portal, you will also be able to view your health information using other applications (apps) compatible with our system.

## 2025-02-22 NOTE — ED PROVIDER NOTE - CLINICAL SUMMARY MEDICAL DECISION MAKING FREE TEXT BOX
44-year-old female known to me with history of opiate abuse excepted to a program which she begins Wednesday is requesting a Suboxone dose, was unable to obtain a prescription, last dose was 2 days ago (8 mg / 2 mg) lasted her until today and she started to feel withdrawal symptoms, will medicate in ED, unfortunately unable to electronically transmit her prescription and our paper prescription writer is broken, on my personal prescription pad I prescribed Suboxone 8 mg -buprenorphine 8mg/naloxone 2mg 1 tablet once a day, 5 tablets dispensed, patient will return to ED if she is unable to fill

## 2025-02-22 NOTE — ED PROVIDER NOTE - OBJECTIVE STATEMENT
Patient is a 44 year old female with PMH of substance use disorder, ADHD, Bipolar Disorder, and OA presenting for medication refill. Patient is requesting a dose of Suboxone and maintenance Suboxone to be sent to pharmacy. Patient reports her last opioid use was 6 days ago. Patient states she is going to a rehab facility on Wednesday. No additional complaints/concerns at this time.

## 2025-02-24 ENCOUNTER — APPOINTMENT (OUTPATIENT)
Dept: PSYCHIATRY | Facility: CLINIC | Age: 45
End: 2025-02-24

## 2025-02-24 ENCOUNTER — OUTPATIENT (OUTPATIENT)
Dept: OUTPATIENT SERVICES | Facility: HOSPITAL | Age: 45
LOS: 1 days | End: 2025-02-24
Payer: MEDICAID

## 2025-02-24 DIAGNOSIS — Z98.890 OTHER SPECIFIED POSTPROCEDURAL STATES: Chronic | ICD-10-CM

## 2025-02-24 DIAGNOSIS — Z33.2 ENCOUNTER FOR ELECTIVE TERMINATION OF PREGNANCY: Chronic | ICD-10-CM

## 2025-02-24 DIAGNOSIS — Z98.891 HISTORY OF UTERINE SCAR FROM PREVIOUS SURGERY: Chronic | ICD-10-CM

## 2025-02-24 DIAGNOSIS — F11.20 OPIOID DEPENDENCE, UNCOMPLICATED: ICD-10-CM

## 2025-02-24 PROCEDURE — 90791 PSYCH DIAGNOSTIC EVALUATION: CPT

## 2025-02-25 ENCOUNTER — APPOINTMENT (OUTPATIENT)
Dept: PSYCHIATRY | Facility: CLINIC | Age: 45
End: 2025-02-25
Payer: MEDICAID

## 2025-02-25 ENCOUNTER — OUTPATIENT (OUTPATIENT)
Dept: OUTPATIENT SERVICES | Facility: HOSPITAL | Age: 45
LOS: 1 days | End: 2025-02-25
Payer: MEDICAID

## 2025-02-25 DIAGNOSIS — Z98.890 OTHER SPECIFIED POSTPROCEDURAL STATES: Chronic | ICD-10-CM

## 2025-02-25 DIAGNOSIS — Z33.2 ENCOUNTER FOR ELECTIVE TERMINATION OF PREGNANCY: Chronic | ICD-10-CM

## 2025-02-25 DIAGNOSIS — F11.20 OPIOID DEPENDENCE, UNCOMPLICATED: ICD-10-CM

## 2025-02-25 DIAGNOSIS — Z98.891 HISTORY OF UTERINE SCAR FROM PREVIOUS SURGERY: Chronic | ICD-10-CM

## 2025-02-25 PROCEDURE — 90792 PSYCH DIAG EVAL W/MED SRVCS: CPT

## 2025-02-26 DIAGNOSIS — F11.20 OPIOID DEPENDENCE, UNCOMPLICATED: ICD-10-CM

## 2025-03-04 ENCOUNTER — APPOINTMENT (OUTPATIENT)
Dept: PULMONOLOGY | Facility: CLINIC | Age: 45
End: 2025-03-04

## 2025-03-04 ENCOUNTER — OUTPATIENT (OUTPATIENT)
Dept: OUTPATIENT SERVICES | Facility: HOSPITAL | Age: 45
LOS: 1 days | End: 2025-03-04
Payer: MEDICAID

## 2025-03-04 ENCOUNTER — APPOINTMENT (OUTPATIENT)
Dept: PSYCHIATRY | Facility: CLINIC | Age: 45
End: 2025-03-04

## 2025-03-04 DIAGNOSIS — Z33.2 ENCOUNTER FOR ELECTIVE TERMINATION OF PREGNANCY: Chronic | ICD-10-CM

## 2025-03-04 DIAGNOSIS — Z98.890 OTHER SPECIFIED POSTPROCEDURAL STATES: Chronic | ICD-10-CM

## 2025-03-04 DIAGNOSIS — F10.20 ALCOHOL DEPENDENCE, UNCOMPLICATED: ICD-10-CM

## 2025-03-04 DIAGNOSIS — Z98.891 HISTORY OF UTERINE SCAR FROM PREVIOUS SURGERY: Chronic | ICD-10-CM

## 2025-03-04 PROCEDURE — 90847 FAMILY PSYTX W/PT 50 MIN: CPT

## 2025-03-05 DIAGNOSIS — F10.20 ALCOHOL DEPENDENCE, UNCOMPLICATED: ICD-10-CM

## 2025-03-10 ENCOUNTER — APPOINTMENT (OUTPATIENT)
Dept: PSYCHIATRY | Facility: CLINIC | Age: 45
End: 2025-03-10

## 2025-03-11 ENCOUNTER — APPOINTMENT (OUTPATIENT)
Dept: PSYCHIATRY | Facility: CLINIC | Age: 45
End: 2025-03-11

## 2025-03-18 ENCOUNTER — APPOINTMENT (OUTPATIENT)
Dept: PSYCHIATRY | Facility: CLINIC | Age: 45
End: 2025-03-18

## 2025-03-18 ENCOUNTER — EMERGENCY (EMERGENCY)
Facility: HOSPITAL | Age: 45
LOS: 0 days | Discharge: HOME | End: 2025-03-18
Attending: EMERGENCY MEDICINE
Payer: MEDICAID

## 2025-03-18 VITALS — WEIGHT: 184.97 LBS | HEIGHT: 63 IN

## 2025-03-18 VITALS
HEART RATE: 84 BPM | SYSTOLIC BLOOD PRESSURE: 110 MMHG | RESPIRATION RATE: 18 BRPM | OXYGEN SATURATION: 99 % | DIASTOLIC BLOOD PRESSURE: 80 MMHG | TEMPERATURE: 98 F

## 2025-03-18 DIAGNOSIS — Z98.890 OTHER SPECIFIED POSTPROCEDURAL STATES: Chronic | ICD-10-CM

## 2025-03-18 DIAGNOSIS — R05.1 ACUTE COUGH: ICD-10-CM

## 2025-03-18 DIAGNOSIS — M19.90 UNSPECIFIED OSTEOARTHRITIS, UNSPECIFIED SITE: ICD-10-CM

## 2025-03-18 DIAGNOSIS — F17.200 NICOTINE DEPENDENCE, UNSPECIFIED, UNCOMPLICATED: ICD-10-CM

## 2025-03-18 DIAGNOSIS — Z33.2 ENCOUNTER FOR ELECTIVE TERMINATION OF PREGNANCY: Chronic | ICD-10-CM

## 2025-03-18 DIAGNOSIS — J02.9 ACUTE PHARYNGITIS, UNSPECIFIED: ICD-10-CM

## 2025-03-18 DIAGNOSIS — R09.81 NASAL CONGESTION: ICD-10-CM

## 2025-03-18 PROCEDURE — 99283 EMERGENCY DEPT VISIT LOW MDM: CPT

## 2025-03-18 RX ORDER — BUPRENORPHINE HYDROCHLORIDE, NALOXONE HYDROCHLORIDE 4; 1 MG/1; MG/1
1 FILM, SOLUBLE BUCCAL; SUBLINGUAL ONCE
Refills: 0 | Status: DISCONTINUED | OUTPATIENT
Start: 2025-03-18 | End: 2025-03-18

## 2025-03-18 RX ORDER — BUPRENORPHINE HYDROCHLORIDE, NALOXONE HYDROCHLORIDE 4; 1 MG/1; MG/1
1 FILM, SOLUBLE BUCCAL; SUBLINGUAL DAILY
Refills: 0 | Status: DISCONTINUED | OUTPATIENT
Start: 2025-03-18 | End: 2025-03-18

## 2025-03-18 RX ORDER — DEXAMETHASONE 0.5 MG/1
10 TABLET ORAL ONCE
Refills: 0 | Status: COMPLETED | OUTPATIENT
Start: 2025-03-18 | End: 2025-03-18

## 2025-03-18 RX ADMIN — DEXAMETHASONE 10 MILLIGRAM(S): 0.5 TABLET ORAL at 09:42

## 2025-03-18 RX ADMIN — BUPRENORPHINE HYDROCHLORIDE, NALOXONE HYDROCHLORIDE 1 TABLET(S): 4; 1 FILM, SOLUBLE BUCCAL; SUBLINGUAL at 09:42

## 2025-03-18 NOTE — ED PROVIDER NOTE - CLINICAL SUMMARY MEDICAL DECISION MAKING FREE TEXT BOX
Patient is a 44-year-old female presents for evaluation of cough cold congestion and sore throat denies any chest pain shortness of breath fevers chills vomiting diarrhea patient is requesting Decadron here as she notes that the only medicine that helps her denies any other symptoms at this time patient also requesting a dose of Suboxone as she unable to take her usual dose however she is seeing her PCP tomorrow requesting 1 dose here    On physical exam patient is normocephalic atraumatic pupils equally round react light, extract nontender nondistended bowel sounds positive extremities well range of motion radial pulse 2+ pedal pulse 2+    Assessment plan patient presents for evaluation sore throat  requesting Decadron and a dose of Suboxone.  Patient's physical exam is unremarkable for range of motion of patient given DuoNeb's and discharged advised to follow-up with PCP next week for 48 hours

## 2025-03-18 NOTE — ED ADULT TRIAGE NOTE - HEIGHT IN FEET
5 Wartpeel Counseling:  I discussed with the patient the risks of Wartpeel including but not limited to erythema, scaling, itching, weeping, crusting, and pain. Infliximab Pregnancy And Lactation Text: This medication is Pregnancy Category B and is considered safe during pregnancy. It is unknown if this medication is excreted in breast milk. Odomzo Counseling- I discussed with the patient the risks of Odomzo including but not limited to nausea, vomiting, diarrhea, constipation, weight loss, changes in the sense of taste, decreased appetite, muscle spasms, and hair loss.  The patient verbalized understanding of the proper use and possible adverse effects of Odomzo.  All of the patient's questions and concerns were addressed. 5-Fu Pregnancy And Lactation Text: This medication is Pregnancy Category X and contraindicated in pregnancy and in women who may become pregnant. It is unknown if this medication is excreted in breast milk. Doxycycline Counseling:  Patient counseled regarding possible photosensitivity and increased risk for sunburn.  Patient instructed to avoid sunlight, if possible.  When exposed to sunlight, patients should wear protective clothing, sunglasses, and sunscreen.  The patient was instructed to call the office immediately if the following severe adverse effects occur:  hearing changes, easy bruising/bleeding, severe headache, or vision changes.  The patient verbalized understanding of the proper use and possible adverse effects of doxycycline.  All of the patient's questions and concerns were addressed. Siliq Pregnancy And Lactation Text: The risk during pregnancy and breastfeeding is uncertain with this medication. Doxepin Pregnancy And Lactation Text: This medication is Pregnancy Category C and it isn't known if it is safe during pregnancy. It is also excreted in breast milk and breast feeding isn't recommended. Winlevi Counseling:  I discussed with the patient the risks of topical clascoterone including but not limited to erythema, scaling, itching, and stinging. Patient voiced their understanding. Picato Counseling:  I discussed with the patient the risks of Picato including but not limited to erythema, scaling, itching, weeping, crusting, and pain. Hydroquinone Counseling:  Patient advised that medication may result in skin irritation, lightening (hypopigmentation), dryness, and burning.  In the event of skin irritation, the patient was advised to reduce the amount of the drug applied or use it less frequently.  Rarely, spots that are treated with hydroquinone can become darker (pseudoochronosis).  Should this occur, patient instructed to stop medication and call the office. The patient verbalized understanding of the proper use and possible adverse effects of hydroquinone.  All of the patient's questions and concerns were addressed. Prednisone Pregnancy And Lactation Text: This medication is Pregnancy Category C and it isn't know if it is safe during pregnancy. This medication is excreted in breast milk. Enbrel Counseling:  I discussed with the patient the risks of etanercept including but not limited to myelosuppression, immunosuppression, autoimmune hepatitis, demyelinating diseases, lymphoma, and infections.  The patient understands that monitoring is required including a PPD at baseline and must alert us or the primary physician if symptoms of infection or other concerning signs are noted. Ketoconazole Counseling:   Patient counseled regarding improving absorption with orange juice.  Adverse effects include but are not limited to breast enlargement, headache, diarrhea, nausea, upset stomach, liver function test abnormalities, taste disturbance, and stomach pain.  There is a rare possibility of liver failure that can occur when taking ketoconazole. The patient understands that monitoring of LFTs may be required, especially at baseline. The patient verbalized understanding of the proper use and possible adverse effects of ketoconazole.  All of the patient's questions and concerns were addressed. Zyclara Counseling:  I discussed with the patient the risks of imiquimod including but not limited to erythema, scaling, itching, weeping, crusting, and pain.  Patient understands that the inflammatory response to imiquimod is variable from person to person and was educated regarded proper titration schedule.  If flu-like symptoms develop, patient knows to discontinue the medication and contact us. Topical Ketoconazole Counseling: Patient counseled that this medication may cause skin irritation or allergic reactions.  In the event of skin irritation, the patient was advised to reduce the amount of the drug applied or use it less frequently.   The patient verbalized understanding of the proper use and possible adverse effects of ketoconazole.  All of the patient's questions and concerns were addressed. Bexarotene Pregnancy And Lactation Text: This medication is Pregnancy Category X and should not be given to women who are pregnant or may become pregnant. This medication should not be used if you are breast feeding. Libtayo Counseling- I discussed with the patient the risks of Libtayo including but not limited to nausea, vomiting, diarrhea, and bone or muscle pain.  The patient verbalized understanding of the proper use and possible adverse effects of Libtayo.  All of the patient's questions and concerns were addressed. Clindamycin Counseling: I counseled the patient regarding use of clindamycin as an antibiotic for prophylactic and/or therapeutic purposes. Clindamycin is active against numerous classes of bacteria, including skin bacteria. Side effects may include nausea, diarrhea, gastrointestinal upset, rash, hives, yeast infections, and in rare cases, colitis. Libtayo Pregnancy And Lactation Text: This medication is contraindicated in pregnancy and when breast feeding. Cimzia Pregnancy And Lactation Text: This medication crosses the placenta but can be considered safe in certain situations. Cimzia may be excreted in breast milk. Dapsone Pregnancy And Lactation Text: This medication is Pregnancy Category C and is not considered safe during pregnancy or breast feeding. Nsaids Counseling: NSAID Counseling: I discussed with the patient that NSAIDs should be taken with food. Prolonged use of NSAIDs can result in the development of stomach ulcers.  Patient advised to stop taking NSAIDs if abdominal pain occurs.  The patient verbalized understanding of the proper use and possible adverse effects of NSAIDs.  All of the patient's questions and concerns were addressed. Isotretinoin Counseling: Patient should get monthly blood tests, not donate blood, not drive at night if vision affected, not share medication, and not undergo elective surgery for 6 months after tx completed. Side effects reviewed, pt to contact office should one occur. Quinolones Pregnancy And Lactation Text: This medication is Pregnancy Category C and it isn't know if it is safe during pregnancy. It is also excreted in breast milk. Tetracycline Counseling: Patient counseled regarding possible photosensitivity and increased risk for sunburn.  Patient instructed to avoid sunlight, if possible.  When exposed to sunlight, patients should wear protective clothing, sunglasses, and sunscreen.  The patient was instructed to call the office immediately if the following severe adverse effects occur:  hearing changes, easy bruising/bleeding, severe headache, or vision changes.  The patient verbalized understanding of the proper use and possible adverse effects of tetracycline.  All of the patient's questions and concerns were addressed. Patient understands to avoid pregnancy while on therapy due to potential birth defects. Acitretin Counseling:  I discussed with the patient the risks of acitretin including but not limited to hair loss, dry lips/skin/eyes, liver damage, hyperlipidemia, depression/suicidal ideation, photosensitivity.  Serious rare side effects can include but are not limited to pancreatitis, pseudotumor cerebri, bony changes, clot formation/stroke/heart attack.  Patient understands that alcohol is contraindicated since it can result in liver toxicity and significantly prolong the elimination of the drug by many years. Finasteride Male Counseling: Finasteride Counseling:  I discussed with the patient the risks of use of finasteride including but not limited to decreased libido, decreased ejaculate volume, gynecomastia, and depression. Women should not handle medication.  All of the patient's questions and concerns were addressed. Sarecycline Pregnancy And Lactation Text: This medication is Pregnancy Category D and not consider safe during pregnancy. It is also excreted in breast milk. Humira Counseling:  I discussed with the patient the risks of adalimumab including but not limited to myelosuppression, immunosuppression, autoimmune hepatitis, demyelinating diseases, lymphoma, and serious infections.  The patient understands that monitoring is required including a PPD at baseline and must alert us or the primary physician if symptoms of infection or other concerning signs are noted. Detail Level: Detailed Minoxidil Counseling: Minoxidil is a topical medication which can increase blood flow where it is applied. It is uncertain how this medication increases hair growth. Side effects are uncommon and include stinging and allergic reactions. Hydroquinone Pregnancy And Lactation Text: This medication has not been assigned a Pregnancy Risk Category but animal studies failed to show danger with the topical medication. It is unknown if the medication is excreted in breast milk. Hydroxychloroquine Counseling:  I discussed with the patient that a baseline ophthalmologic exam is needed at the start of therapy and every year thereafter while on therapy. A CBC may also be warranted for monitoring.  The side effects of this medication were discussed with the patient, including but not limited to agranulocytosis, aplastic anemia, seizures, rashes, retinopathy, and liver toxicity. Patient instructed to call the office should any adverse effect occur.  The patient verbalized understanding of the proper use and possible adverse effects of Plaquenil.  All the patient's questions and concerns were addressed. Niacinamide Pregnancy And Lactation Text: These medications are considered safe during pregnancy. Topical Sulfur Applications Pregnancy And Lactation Text: This medication is Pregnancy Category C and has an unknown safety profile during pregnancy. It is unknown if this topical medication is excreted in breast milk. Rituxan Pregnancy And Lactation Text: This medication is Pregnancy Category C and it isn't know if it is safe during pregnancy. It is unknown if this medication is excreted in breast milk but similar antibodies are known to be excreted. Spironolactone Counseling: Patient advised regarding risks of diarrhea, abdominal pain, hyperkalemia, birth defects (for female patients), liver toxicity and renal toxicity. The patient may need blood work to monitor liver and kidney function and potassium levels while on therapy. The patient verbalized understanding of the proper use and possible adverse effects of spironolactone.  All of the patient's questions and concerns were addressed. Glycopyrrolate Counseling:  I discussed with the patient the risks of glycopyrrolate including but not limited to skin rash, drowsiness, dry mouth, difficulty urinating, and blurred vision. Zyclara Pregnancy And Lactation Text: This medication is Pregnancy Category C. It is unknown if this medication is excreted in breast milk. Minocycline Counseling: Patient advised regarding possible photosensitivity and discoloration of the teeth, skin, lips, tongue and gums.  Patient instructed to avoid sunlight, if possible.  When exposed to sunlight, patients should wear protective clothing, sunglasses, and sunscreen.  The patient was instructed to call the office immediately if the following severe adverse effects occur:  hearing changes, easy bruising/bleeding, severe headache, or vision changes.  The patient verbalized understanding of the proper use and possible adverse effects of minocycline.  All of the patient's questions and concerns were addressed. Itraconazole Counseling:  I discussed with the patient the risks of itraconazole including but not limited to liver damage, nausea/vomiting, neuropathy, and severe allergy.  The patient understands that this medication is best absorbed when taken with acidic beverages such as non-diet cola or ginger ale.  The patient understands that monitoring is required including baseline LFTs and repeat LFTs at intervals.  The patient understands that they are to contact us or the primary physician if concerning signs are noted. Dupixent Pregnancy And Lactation Text: This medication likely crosses the placenta but the risk for the fetus is uncertain. This medication is excreted in breast milk. Elidel Counseling: Patient may experience a mild burning sensation during topical application. Elidel is not approved in children less than 2 years of age. There have been case reports of hematologic and skin malignancies in patients using topical calcineurin inhibitors although causality is questionable. Ilumya Counseling: I discussed with the patient the risks of tildrakizumab including but not limited to immunosuppression, malignancy, posterior leukoencephalopathy syndrome, and serious infections.  The patient understands that monitoring is required including a PPD at baseline and must alert us or the primary physician if symptoms of infection or other concerning signs are noted. Opioid Counseling: I discussed with the patient the potential side effects of opioids including but not limited to addiction, altered mental status, and depression. I stressed avoiding alcohol, benzodiazepines, muscle relaxants and sleep aids unless specifically okayed by a physician. The patient verbalized understanding of the proper use and possible adverse effects of opioids. All of the patient's questions and concerns were addressed. They were instructed to flush the remaining pills down the toilet if they did not need them for pain. Quinolones Counseling:  I discussed with the patient the risks of fluoroquinolones including but not limited to GI upset, allergic reaction, drug rash, diarrhea, dizziness, photosensitivity, yeast infections, liver function test abnormalities, tendonitis/tendon rupture. Valtrex Pregnancy And Lactation Text: this medication is Pregnancy Category B and is considered safe during pregnancy. This medication is not directly found in breast milk but it's metabolite acyclovir is present. Arava Pregnancy And Lactation Text: This medication is Pregnancy Category X and is absolutely contraindicated during pregnancy. It is unknown if it is excreted in breast milk. Topical Sulfur Applications Counseling: Topical Sulfur Counseling: Patient counseled that this medication may cause skin irritation or allergic reactions.  In the event of skin irritation, the patient was advised to reduce the amount of the drug applied or use it less frequently.   The patient verbalized understanding of the proper use and possible adverse effects of topical sulfur application.  All of the patient's questions and concerns were addressed. Hydroxychloroquine Pregnancy And Lactation Text: This medication has been shown to cause fetal harm but it isn't assigned a Pregnancy Risk Category. There are small amounts excreted in breast milk. Winlevi Pregnancy And Lactation Text: This medication is considered safe during pregnancy and breastfeeding. Tranexamic Acid Counseling:  Patient advised of the small risk of bleeding problems with tranexamic acid. They were also instructed to call if they developed any nausea, vomiting or diarrhea. All of the patient's questions and concerns were addressed. Oxybutynin Pregnancy And Lactation Text: This medication is Pregnancy Category B and is considered safe during pregnancy. It is unknown if it is excreted in breast milk. Imiquimod Counseling:  I discussed with the patient the risks of imiquimod including but not limited to erythema, scaling, itching, weeping, crusting, and pain.  Patient understands that the inflammatory response to imiquimod is variable from person to person and was educated regarded proper titration schedule.  If flu-like symptoms develop, patient knows to discontinue the medication and contact us. Azathioprine Counseling:  I discussed with the patient the risks of azathioprine including but not limited to myelosuppression, immunosuppression, hepatotoxicity, lymphoma, and infections.  The patient understands that monitoring is required including baseline LFTs, Creatinine, possible TPMP genotyping and weekly CBCs for the first month and then every 2 weeks thereafter.  The patient verbalized understanding of the proper use and possible adverse effects of azathioprine.  All of the patient's questions and concerns were addressed. Infliximab Counseling:  I discussed with the patient the risks of infliximab including but not limited to myelosuppression, immunosuppression, autoimmune hepatitis, demyelinating diseases, lymphoma, and serious infections.  The patient understands that monitoring is required including a PPD at baseline and must alert us or the primary physician if symptoms of infection or other concerning signs are noted. Include Pregnancy/Lactation Warning?: No Tranexamic Acid Pregnancy And Lactation Text: It is unknown if this medication is safe during pregnancy or breast feeding. Sarecycline Counseling: Patient advised regarding possible photosensitivity and discoloration of the teeth, skin, lips, tongue and gums.  Patient instructed to avoid sunlight, if possible.  When exposed to sunlight, patients should wear protective clothing, sunglasses, and sunscreen.  The patient was instructed to call the office immediately if the following severe adverse effects occur:  hearing changes, easy bruising/bleeding, severe headache, or vision changes.  The patient verbalized understanding of the proper use and possible adverse effects of sarecycline.  All of the patient's questions and concerns were addressed. Rinvoq Counseling: I discussed with the patient the risks of Rinvoq therapy including but not limited to upper respiratory tract infections, shingles, cold sores, bronchitis, nausea, cough, fever, acne, and headache. Live vaccines should be avoided.  This medication has been linked to serious infections; higher rate of mortality; malignancy and lymphoproliferative disorders; major adverse cardiovascular events; thrombosis; thrombocytopenia, anemia, and neutropenia; lipid elevations; liver enzyme elevations; and gastrointestinal perforations. Simponi Counseling:  I discussed with the patient the risks of golimumab including but not limited to myelosuppression, immunosuppression, autoimmune hepatitis, demyelinating diseases, lymphoma, and serious infections.  The patient understands that monitoring is required including a PPD at baseline and must alert us or the primary physician if symptoms of infection or other concerning signs are noted. Thalidomide Counseling: I discussed with the patient the risks of thalidomide including but not limited to birth defects, anxiety, weakness, chest pain, dizziness, cough and severe allergy. Valtrex Counseling: I discussed with the patient the risks of valacyclovir including but not limited to kidney damage, nausea, vomiting and severe allergy.  The patient understands that if the infection seems to be worsening or is not improving, they are to call. Drysol Pregnancy And Lactation Text: This medication is considered safe during pregnancy and breast feeding. Solaraze Counseling:  I discussed with the patient the risks of Solaraze including but not limited to erythema, scaling, itching, weeping, crusting, and pain. Taltz Counseling: I discussed with the patient the risks of ixekizumab including but not limited to immunosuppression, serious infections, worsening of inflammatory bowel disease and drug reactions.  The patient understands that monitoring is required including a PPD at baseline and must alert us or the primary physician if symptoms of infection or other concerning signs are noted. Olumiant Counseling: I discussed with the patient the risks of Olumiant therapy including but not limited to upper respiratory tract infections, shingles, cold sores, and nausea. Live vaccines should be avoided.  This medication has been linked to serious infections; higher rate of mortality; malignancy and lymphoproliferative disorders; major adverse cardiovascular events; thrombosis; gastrointestinal perforations; neutropenia; lymphopenia; anemia; liver enzyme elevations; and lipid elevations. Cibinqo Pregnancy And Lactation Text: It is unknown if this medication will adversely affect pregnancy or breast feeding.  You should not take this medication if you are currently pregnant or planning a pregnancy or while breastfeeding. Siliq Counseling:  I discussed with the patient the risks of Siliq including but not limited to new or worsening depression, suicidal thoughts and behavior, immunosuppression, malignancy, posterior leukoencephalopathy syndrome, and serious infections.  The patient understands that monitoring is required including a PPD at baseline and must alert us or the primary physician if symptoms of infection or other concerning signs are noted. There is also a special program designed to monitor depression which is required with Siliq. Isotretinoin Pregnancy And Lactation Text: This medication is Pregnancy Category X and is considered extremely dangerous during pregnancy. It is unknown if it is excreted in breast milk. Gabapentin Counseling: I discussed with the patient the risks of gabapentin including but not limited to dizziness, somnolence, fatigue and ataxia. Hydroxyzine Counseling: Patient advised that the medication is sedating and not to drive a car after taking this medication.  Patient informed of potential adverse effects including but not limited to dry mouth, urinary retention, and blurry vision.  The patient verbalized understanding of the proper use and possible adverse effects of hydroxyzine.  All of the patient's questions and concerns were addressed. Tazorac Counseling:  Patient advised that medication is irritating and drying.  Patient may need to apply sparingly and wash off after an hour before eventually leaving it on overnight.  The patient verbalized understanding of the proper use and possible adverse effects of tazorac.  All of the patient's questions and concerns were addressed. Griseofulvin Counseling:  I discussed with the patient the risks of griseofulvin including but not limited to photosensitivity, cytopenia, liver damage, nausea/vomiting and severe allergy.  The patient understands that this medication is best absorbed when taken with a fatty meal (e.g., ice cream or french fries). Bactrim Counseling:  I discussed with the patient the risks of sulfa antibiotics including but not limited to GI upset, allergic reaction, drug rash, diarrhea, dizziness, photosensitivity, and yeast infections.  Rarely, more serious reactions can occur including but not limited to aplastic anemia, agranulocytosis, methemoglobinemia, blood dyscrasias, liver or kidney failure, lung infiltrates or desquamative/blistering drug rashes. Hydroxyzine Pregnancy And Lactation Text: This medication is not safe during pregnancy and should not be taken. It is also excreted in breast milk and breast feeding isn't recommended. Nsaids Pregnancy And Lactation Text: These medications are considered safe up to 30 weeks gestation. It is excreted in breast milk. Azithromycin Counseling:  I discussed with the patient the risks of azithromycin including but not limited to GI upset, allergic reaction, drug rash, diarrhea, and yeast infections. SSKI Counseling:  I discussed with the patient the risks of SSKI including but not limited to thyroid abnormalities, metallic taste, GI upset, fever, headache, acne, arthralgias, paraesthesias, lymphadenopathy, easy bleeding, arrhythmias, and allergic reaction. High Dose Vitamin A Pregnancy And Lactation Text: High dose vitamin A therapy is contraindicated during pregnancy and breast feeding. Drysol Counseling:  I discussed with the patient the risks of drysol/aluminum chloride including but not limited to skin rash, itching, irritation, burning. Skyrizi Counseling: I discussed with the patient the risks of risankizumab-rzaa including but not limited to immunosuppression, and serious infections.  The patient understands that monitoring is required including a PPD at baseline and must alert us or the primary physician if symptoms of infection or other concerning signs are noted. Azithromycin Pregnancy And Lactation Text: This medication is considered safe during pregnancy and is also secreted in breast milk. Klisyri Counseling:  I discussed with the patient the risks of Klisyri including but not limited to erythema, scaling, itching, weeping, crusting, and pain. Klisyri Pregnancy And Lactation Text: It is unknown if this medication can harm a developing fetus or if it is excreted in breast milk. Topical Clindamycin Counseling: Patient counseled that this medication may cause skin irritation or allergic reactions.  In the event of skin irritation, the patient was advised to reduce the amount of the drug applied or use it less frequently.   The patient verbalized understanding of the proper use and possible adverse effects of clindamycin.  All of the patient's questions and concerns were addressed. Glycopyrrolate Pregnancy And Lactation Text: This medication is Pregnancy Category B and is considered safe during pregnancy. It is unknown if it is excreted breast milk. 5-Fu Counseling: 5-Fluorouracil Counseling:  I discussed with the patient the risks of 5-fluorouracil including but not limited to erythema, scaling, itching, weeping, crusting, and pain. Olumiant Pregnancy And Lactation Text: Based on animal studies, Olumiant may cause embryo-fetal harm when administered to pregnant women.  The medication should not be used in pregnancy.  Breastfeeding is not recommended during treatment. Xolair Counseling:  Patient informed of potential adverse effects including but not limited to fever, muscle aches, rash and allergic reactions.  The patient verbalized understanding of the proper use and possible adverse effects of Xolair.  All of the patient's questions and concerns were addressed. Methotrexate Counseling:  Patient counseled regarding adverse effects of methotrexate including but not limited to nausea, vomiting, abnormalities in liver function tests. Patients may develop mouth sores, rash, diarrhea, and abnormalities in blood counts. The patient understands that monitoring is required including LFT's and blood counts.  There is a rare possibility of scarring of the liver and lung problems that can occur when taking methotrexate. Persistent nausea, loss of appetite, pale stools, dark urine, cough, and shortness of breath should be reported immediately. Patient advised to discontinue methotrexate treatment at least three months before attempting to become pregnant.  I discussed the need for folate supplements while taking methotrexate.  These supplements can decrease side effects during methotrexate treatment. The patient verbalized understanding of the proper use and possible adverse effects of methotrexate.  All of the patient's questions and concerns were addressed. Xeljanz Counseling: I discussed with the patient the risks of Xeljanz therapy including increased risk of infection, liver issues, headache, diarrhea, or cold symptoms. Live vaccines should be avoided. They were instructed to call if they have any problems. Sski Pregnancy And Lactation Text: This medication is Pregnancy Category D and isn't considered safe during pregnancy. It is excreted in breast milk. Ketoconazole Pregnancy And Lactation Text: This medication is Pregnancy Category C and it isn't know if it is safe during pregnancy. It is also excreted in breast milk and breast feeding isn't recommended. Dutasteride Male Counseling: Dustasteride Counseling:  I discussed with the patient the risks of use of dutasteride including but not limited to decreased libido, decreased ejaculate volume, and gynecomastia. Women who can become pregnant should not handle medication.  All of the patient's questions and concerns were addressed. Colchicine Counseling:  Patient counseled regarding adverse effects including but not limited to stomach upset (nausea, vomiting, stomach pain, or diarrhea).  Patient instructed to limit alcohol consumption while taking this medication.  Colchicine may reduce blood counts especially with prolonged use.  The patient understands that monitoring of kidney function and blood counts may be required, especially at baseline. The patient verbalized understanding of the proper use and possible adverse effects of colchicine.  All of the patient's questions and concerns were addressed. Rhofade Counseling: Rhofade is a topical medication which can decrease superficial blood flow where applied. Side effects are uncommon and include stinging, redness and allergic reactions. Cimzia Counseling:  I discussed with the patient the risks of Cimzia including but not limited to immunosuppression, allergic reactions and infections.  The patient understands that monitoring is required including a PPD at baseline and must alert us or the primary physician if symptoms of infection or other concerning signs are noted. Tremfya Counseling: I discussed with the patient the risks of guselkumab including but not limited to immunosuppression, serious infections, worsening of inflammatory bowel disease and drug reactions.  The patient understands that monitoring is required including a PPD at baseline and must alert us or the primary physician if symptoms of infection or other concerning signs are noted. Stelara Counseling:  I discussed with the patient the risks of ustekinumab including but not limited to immunosuppression, malignancy, posterior leukoencephalopathy syndrome, and serious infections.  The patient understands that monitoring is required including a PPD at baseline and must alert us or the primary physician if symptoms of infection or other concerning signs are noted. Rinvoq Pregnancy And Lactation Text: Based on animal studies, Rinvoq may cause embryo-fetal harm when administered to pregnant women.  The medication should not be used in pregnancy.  Breastfeeding is not recommended during treatment and for 6 days after the last dose. Xelectorz Pregnancy And Lactation Text: This medication is Pregnancy Category D and is not considered safe during pregnancy.  The risk during breast feeding is also uncertain. Tazorac Pregnancy And Lactation Text: This medication is not safe during pregnancy. It is unknown if this medication is excreted in breast milk. Erivedge Counseling- I discussed with the patient the risks of Erivedge including but not limited to nausea, vomiting, diarrhea, constipation, weight loss, changes in the sense of taste, decreased appetite, muscle spasms, and hair loss.  The patient verbalized understanding of the proper use and possible adverse effects of Erivedge.  All of the patient's questions and concerns were addressed. Opioid Pregnancy And Lactation Text: These medications can lead to premature delivery and should be avoided during pregnancy. These medications are also present in breast milk in small amounts. Birth Control Pills Pregnancy And Lactation Text: This medication should be avoided if pregnant and for the first 30 days post-partum. Finasteride Pregnancy And Lactation Text: This medication is absolutely contraindicated during pregnancy. It is unknown if it is excreted in breast milk. Gabapentin Pregnancy And Lactation Text: This medication is Pregnancy Category C and isn't considered safe during pregnancy. It is excreted in breast milk. Erythromycin Pregnancy And Lactation Text: This medication is Pregnancy Category B and is considered safe during pregnancy. It is also excreted in breast milk. Clofazimine Counseling:  I discussed with the patient the risks of clofazimine including but not limited to skin and eye pigmentation, liver damage, nausea/vomiting, gastrointestinal bleeding and allergy. Xolair Pregnancy And Lactation Text: This medication is Pregnancy Category B and is considered safe during pregnancy. This medication is excreted in breast milk. Azathioprine Pregnancy And Lactation Text: This medication is Pregnancy Category D and isn't considered safe during pregnancy. It is unknown if this medication is excreted in breast milk. Topical Retinoid counseling:  Patient advised to apply a pea-sized amount only at bedtime and wait 30 minutes after washing their face before applying.  If too drying, patient may add a non-comedogenic moisturizer. The patient verbalized understanding of the proper use and possible adverse effects of retinoids.  All of the patient's questions and concerns were addressed. Ivermectin Pregnancy And Lactation Text: This medication is Pregnancy Category C and it isn't known if it is safe during pregnancy. It is also excreted in breast milk. Adbry Pregnancy And Lactation Text: It is unknown if this medication will adversely affect pregnancy or breast feeding. Oral Minoxidil Pregnancy And Lactation Text: This medication should only be used when clearly needed if you are pregnant, attempting to become pregnant or breast feeding. Qbrexza Counseling:  I discussed with the patient the risks of Qbrexza including but not limited to headache, mydriasis, blurred vision, dry eyes, nasal dryness, dry mouth, dry throat, dry skin, urinary hesitation, and constipation.  Local skin reactions including erythema, burning, stinging, and itching can also occur. Cephalexin Counseling: I counseled the patient regarding use of cephalexin as an antibiotic for prophylactic and/or therapeutic purposes. Cephalexin (commonly prescribed under brand name Keflex) is a cephalosporin antibiotic which is active against numerous classes of bacteria, including most skin bacteria. Side effects may include nausea, diarrhea, gastrointestinal upset, rash, hives, yeast infections, and in rare cases, hepatitis, kidney disease, seizures, fever, confusion, neurologic symptoms, and others. Patients with severe allergies to penicillin medications are cautioned that there is about a 10% incidence of cross-reactivity with cephalosporins. When possible, patients with penicillin allergies should use alternatives to cephalosporins for antibiotic therapy. Otezla Counseling: The side effects of Otezla were discussed with the patient, including but not limited to worsening or new depression, weight loss, diarrhea, nausea, upper respiratory tract infection, and headache. Patient instructed to call the office should any adverse effect occur.  The patient verbalized understanding of the proper use and possible adverse effects of Otezla.  All the patient's questions and concerns were addressed. Spironolactone Pregnancy And Lactation Text: This medication can cause feminization of the male fetus and should be avoided during pregnancy. The active metabolite is also found in breast milk. Prednisone Counseling:  I discussed with the patient the risks of prolonged use of prednisone including but not limited to weight gain, insomnia, osteoporosis, mood changes, diabetes, susceptibility to infection, glaucoma and high blood pressure.  In cases where prednisone use is prolonged, patients should be monitored with blood pressure checks, serum glucose levels and an eye exam.  Additionally, the patient may need to be placed on GI prophylaxis, PCP prophylaxis, and calcium and vitamin D supplementation and/or a bisphosphonate.  The patient verbalized understanding of the proper use and the possible adverse effects of prednisone.  All of the patient's questions and concerns were addressed. Benzoyl Peroxide Counseling: Patient counseled that medicine may cause skin irritation and bleach clothing.  In the event of skin irritation, the patient was advised to reduce the amount of the drug applied or use it less frequently.   The patient verbalized understanding of the proper use and possible adverse effects of benzoyl peroxide.  All of the patient's questions and concerns were addressed. Clindamycin Pregnancy And Lactation Text: This medication can be used in pregnancy if certain situations. Clindamycin is also present in breast milk. Rituxan Counseling:  I discussed with the patient the risks of Rituxan infusions. Side effects can include infusion reactions, severe drug rashes including mucocutaneous reactions, reactivation of latent hepatitis and other infections and rarely progressive multifocal leukoencephalopathy.  All of the patient's questions and concerns were addressed. Erythromycin Counseling:  I discussed with the patient the risks of erythromycin including but not limited to GI upset, allergic reaction, drug rash, diarrhea, increase in liver enzymes, and yeast infections. Cyclosporine Counseling:  I discussed with the patient the risks of cyclosporine including but not limited to hypertension, gingival hyperplasia,myelosuppression, immunosuppression, liver damage, kidney damage, neurotoxicity, lymphoma, and serious infections. The patient understands that monitoring is required including baseline blood pressure, CBC, CMP, lipid panel and uric acid, and then 1-2 times monthly CMP and blood pressure. Qbrexza Pregnancy And Lactation Text: There is no available data on Qbrexza use in pregnant women.  There is no available data on Qbrexza use in lactation. Carac Counseling:  I discussed with the patient the risks of Carac including but not limited to erythema, scaling, itching, weeping, crusting, and pain. Mirvaso Pregnancy And Lactation Text: This medication has not been assigned a Pregnancy Risk Category. It is unknown if the medication is excreted in breast milk. Solaraze Pregnancy And Lactation Text: This medication is Pregnancy Category B and is considered safe. There is some data to suggest avoiding during the third trimester. It is unknown if this medication is excreted in breast milk. Oral Minoxidil Counseling- I discussed with the patient the risks of oral minoxidil including but not limited to shortness of breath, swelling of the feet or ankles, dizziness, lightheadedness, unwanted hair growth and allergic reaction.  The patient verbalized understanding of the proper use and possible adverse effects of oral minoxidil.  All of the patient's questions and concerns were addressed. Metronidazole Pregnancy And Lactation Text: This medication is Pregnancy Category B and considered safe during pregnancy.  It is also excreted in breast milk. Cellcept Counseling:  I discussed with the patient the risks of mycophenolate mofetil including but not limited to infection/immunosuppression, GI upset, hypokalemia, hypercholesterolemia, bone marrow suppression, lymphoproliferative disorders, malignancy, GI ulceration/bleed/perforation, colitis, interstitial lung disease, kidney failure, progressive multifocal leukoencephalopathy, and birth defects.  The patient understands that monitoring is required including a baseline creatinine and regular CBC testing. In addition, patient must alert us immediately if symptoms of infection or other concerning signs are noted. Dapsone Counseling: I discussed with the patient the risks of dapsone including but not limited to hemolytic anemia, agranulocytosis, rashes, methemoglobinemia, kidney failure, peripheral neuropathy, headaches, GI upset, and liver toxicity.  Patients who start dapsone require monitoring including baseline LFTs and weekly CBCs for the first month, then every month thereafter.  The patient verbalized understanding of the proper use and possible adverse effects of dapsone.  All of the patient's questions and concerns were addressed. Propranolol Counseling:  I discussed with the patient the risks of propranolol including but not limited to low heart rate, low blood pressure, low blood sugar, restlessness and increased cold sensitivity. They should call the office if they experience any of these side effects. Birth Control Pills Counseling: Birth Control Pill Counseling: I discussed with the patient the potential side effects of OCPs including but not limited to increased risk of stroke, heart attack, thrombophlebitis, deep venous thrombosis, hepatic adenomas, breast changes, GI upset, headaches, and depression.  The patient verbalized understanding of the proper use and possible adverse effects of OCPs. All of the patient's questions and concerns were addressed. Niacinamide Counseling: I recommended taking niacin or niacinamide, also know as vitamin B3, twice daily. Recent evidence suggests that taking vitamin B3 (500 mg twice daily) can reduce the risk of actinic keratoses and non-melanoma skin cancers. Side effects of vitamin B3 include flushing and headache. Cyclophosphamide Pregnancy And Lactation Text: This medication is Pregnancy Category D and it isn't considered safe during pregnancy. This medication is excreted in breast milk. Protopic Pregnancy And Lactation Text: This medication is Pregnancy Category C. It is unknown if this medication is excreted in breast milk when applied topically. Doxycycline Pregnancy And Lactation Text: This medication is Pregnancy Category D and not consider safe during pregnancy. It is also excreted in breast milk but is considered safe for shorter treatment courses. Terbinafine Counseling: Patient counseling regarding adverse effects of terbinafine including but not limited to headache, diarrhea, rash, upset stomach, liver function test abnormalities, itching, taste/smell disturbance, nausea, abdominal pain, and flatulence.  There is a rare possibility of liver failure that can occur when taking terbinafine.  The patient understands that a baseline LFT and kidney function test may be required. The patient verbalized understanding of the proper use and possible adverse effects of terbinafine.  All of the patient's questions and concerns were addressed. Cimetidine Pregnancy And Lactation Text: This medication is Pregnancy Category B and is considered safe during pregnancy. It is also excreted in breast milk and breast feeding isn't recommended. Bactrim Pregnancy And Lactation Text: This medication is Pregnancy Category D and is known to cause fetal risk.  It is also excreted in breast milk. High Dose Vitamin A Counseling: Side effects reviewed, pt to contact office should one occur. Opzelura Pregnancy And Lactation Text: There is insufficient data to evaluate drug-associated risk for major birth defects, miscarriage, or other adverse maternal or fetal outcomes.  There is a pregnancy registry that monitors pregnancy outcomes in pregnant persons exposed to the medication during pregnancy.  It is unknown if this medication is excreted in breast milk.  Do not breastfeed during treatment and for about 4 weeks after the last dose. Mirvaso Counseling: Mirvaso is a topical medication which can decrease superficial blood flow where applied. Side effects are uncommon and include stinging, redness and allergic reactions. Oxybutynin Counseling:  I discussed with the patient the risks of oxybutynin including but not limited to skin rash, drowsiness, dry mouth, difficulty urinating, and blurred vision. Adbry Counseling: I discussed with the patient the risks of tralokinumab including but not limited to eye infection and irritation, cold sores, injection site reactions, worsening of asthma, allergic reactions and increased risk of parasitic infection.  Live vaccines should be avoided while taking tralokinumab. The patient understands that monitoring is required and they must alert us or the primary physician if symptoms of infection or other concerning signs are noted. Aklief Pregnancy And Lactation Text: It is unknown if this medication is safe to use during pregnancy.  It is unknown if this medication is excreted in breast milk.  Breastfeeding women should use the topical cream on the smallest area of the skin for the shortest time needed while breastfeeding.  Do not apply to nipple and areola. Griseofulvin Pregnancy And Lactation Text: This medication is Pregnancy Category X and is known to cause serious birth defects. It is unknown if this medication is excreted in breast milk but breast feeding should be avoided. Dutasteride Pregnancy And Lactation Text: This medication is absolutely contraindicated in women, especially during pregnancy and breast feeding. Feminization of male fetuses is possible if taking while pregnant. Fluconazole Counseling:  Patient counseled regarding adverse effects of fluconazole including but not limited to headache, diarrhea, nausea, upset stomach, liver function test abnormalities, taste disturbance, and stomach pain.  There is a rare possibility of liver failure that can occur when taking fluconazole.  The patient understands that monitoring of LFTs and kidney function test may be required, especially at baseline. The patient verbalized understanding of the proper use and possible adverse effects of fluconazole.  All of the patient's questions and concerns were addressed. Albendazole Counseling:  I discussed with the patient the risks of albendazole including but not limited to cytopenia, kidney damage, nausea/vomiting and severe allergy.  The patient understands that this medication is being used in an off-label manner. Bexarotene Counseling:  I discussed with the patient the risks of bexarotene including but not limited to hair loss, dry lips/skin/eyes, liver abnormalities, hyperlipidemia, pancreatitis, depression/suicidal ideation, photosensitivity, drug rash/allergic reactions, hypothyroidism, anemia, leukopenia, infection, cataracts, and teratogenicity.  Patient understands that they will need regular blood tests to check lipid profile, liver function tests, white blood cell count, thyroid function tests and pregnancy test if applicable. Opzelura Counseling:  I discussed with the patient the risks of Opzelura including but not limited to nasopharngitis, bronchitis, ear infection, eosinophila, hives, diarrhea, folliculitis, tonsillitis, and rhinorrhea.  Taken orally, this medication has been linked to serious infections; higher rate of mortality; malignancy and lymphoproliferative disorders; major adverse cardiovascular events; thrombosis; thrombocytopenia, anemia, and neutropenia; and lipid elevations. Acitretin Pregnancy And Lactation Text: This medication is Pregnancy Category X and should not be given to women who are pregnant or may become pregnant in the future. This medication is excreted in breast milk. Ivermectin Counseling:  Patient instructed to take medication on an empty stomach with a full glass of water.  Patient informed of potential adverse effects including but not limited to nausea, diarrhea, dizziness, itching, and swelling of the extremities or lymph nodes.  The patient verbalized understanding of the proper use and possible adverse effects of ivermectin.  All of the patient's questions and concerns were addressed. Azelaic Acid Counseling: Patient counseled that medicine may cause skin irritation and to avoid applying near the eyes.  In the event of skin irritation, the patient was advised to reduce the amount of the drug applied or use it less frequently.   The patient verbalized understanding of the proper use and possible adverse effects of azelaic acid.  All of the patient's questions and concerns were addressed. Protopic Counseling: Patient may experience a mild burning sensation during topical application. Protopic is not approved in children less than 2 years of age. There have been case reports of hematologic and skin malignancies in patients using topical calcineurin inhibitors although causality is questionable. Methotrexate Pregnancy And Lactation Text: This medication is Pregnancy Category X and is known to cause fetal harm. This medication is excreted in breast milk. Cephalexin Pregnancy And Lactation Text: This medication is Pregnancy Category B and considered safe during pregnancy.  It is also excreted in breast milk but can be used safely for shorter doses. Dupixent Counseling: I discussed with the patient the risks of dupilumab including but not limited to eye infection and irritation, cold sores, injection site reactions, worsening of asthma, allergic reactions and increased risk of parasitic infection.  Live vaccines should be avoided while taking dupilumab. Dupilumab will also interact with certain medications such as warfarin and cyclosporine. The patient understands that monitoring is required and they must alert us or the primary physician if symptoms of infection or other concerning signs are noted. Benzoyl Peroxide Pregnancy And Lactation Text: This medication is Pregnancy Category C. It is unknown if benzoyl peroxide is excreted in breast milk. Metronidazole Counseling:  I discussed with the patient the risks of metronidazole including but not limited to seizures, nausea/vomiting, a metallic taste in the mouth, nausea/vomiting and severe allergy. Doxepin Counseling:  Patient advised that the medication is sedating and not to drive a car after taking this medication. Patient informed of potential adverse effects including but not limited to dry mouth, urinary retention, and blurry vision.  The patient verbalized understanding of the proper use and possible adverse effects of doxepin.  All of the patient's questions and concerns were addressed. Eucrisa Counseling: Patient may experience a mild burning sensation during topical application. Eucrisa is not approved in children less than 2 years of age. Propranolol Pregnancy And Lactation Text: This medication is Pregnancy Category C and it isn't known if it is safe during pregnancy. It is excreted in breast milk. Rifampin Counseling: I discussed with the patient the risks of rifampin including but not limited to liver damage, kidney damage, red-orange body fluids, nausea/vomiting and severe allergy. Rifampin Pregnancy And Lactation Text: This medication is Pregnancy Category C and it isn't know if it is safe during pregnancy. It is also excreted in breast milk and should not be used if you are breast feeding. Cosentyx Counseling:  I discussed with the patient the risks of Cosentyx including but not limited to worsening of Crohn's disease, immunosuppression, allergic reactions and infections.  The patient understands that monitoring is required including a PPD at baseline and must alert us or the primary physician if symptoms of infection or other concerning signs are noted. Arava Counseling:  Patient counseled regarding adverse effects of Arava including but not limited to nausea, vomiting, abnormalities in liver function tests. Patients may develop mouth sores, rash, diarrhea, and abnormalities in blood counts. The patient understands that monitoring is required including LFTs and blood counts.  There is a rare possibility of scarring of the liver and lung problems that can occur when taking methotrexate. Persistent nausea, loss of appetite, pale stools, dark urine, cough, and shortness of breath should be reported immediately. Patient advised to discontinue Arava treatment and consult with a physician prior to attempting conception. The patient will have to undergo a treatment to eliminate Arava from the body prior to conception. Aklief counseling:  Patient advised to apply a pea-sized amount only at bedtime and wait 30 minutes after washing their face before applying.  If too drying, patient may add a non-comedogenic moisturizer.  The most commonly reported side effects including irritation, redness, scaling, dryness, stinging, burning, itching, and increased risk of sunburn.  The patient verbalized understanding of the proper use and possible adverse effects of retinoids.  All of the patient's questions and concerns were addressed. Otezla Pregnancy And Lactation Text: This medication is Pregnancy Category C and it isn't known if it is safe during pregnancy. It is unknown if it is excreted in breast milk. Cibinqo Counseling: I discussed with the patient the risks of Cibinqo therapy including but not limited to common cold, nausea, headache, cold sores, increased blood CPK levels, dizziness, UTIs, fatigue, acne, and vomitting. Live vaccines should be avoided.  This medication has been linked to serious infections; higher rate of mortality; malignancy and lymphoproliferative disorders; major adverse cardiovascular events; thrombosis; thrombocytopenia and lymphopenia; lipid elevations; and retinal detachment. Cyclophosphamide Counseling:  I discussed with the patient the risks of cyclophosphamide including but not limited to hair loss, hormonal abnormalities, decreased fertility, abdominal pain, diarrhea, nausea and vomiting, bone marrow suppression and infection. The patient understands that monitoring is required while taking this medication. Cimetidine Counseling:  I discussed with the patient the risks of Cimetidine including but not limited to gynecomastia, headache, diarrhea, nausea, drowsiness, arrhythmias, pancreatitis, skin rashes, psychosis, bone marrow suppression and kidney toxicity. Soolantra Counseling: I discussed with the patients the risks of topial Soolantra. This is a medicine which decreases the number of mites and inflammation in the skin. You experience burning, stinging, eye irritation or allergic reactions.  Please call our office if you develop any problems from using this medication. Litfulo Counseling: I discussed with the patient the risks of Litfulo therapy including but not limited to upper respiratory tract infections, shingles, cold sores, and nausea. Live vaccines should be avoided.  This medication has been linked to serious infections; higher rate of mortality; malignancy and lymphoproliferative disorders; major adverse cardiovascular events; thrombosis; gastrointestinal perforations; neutropenia; lymphopenia; anemia; liver enzyme elevations; and lipid elevations. Litfulo Pregnancy And Lactation Text: Based on animal studies, Lifulo may cause embryo-fetal harm when administered to pregnant women.  The medication should not be used in pregnancy.  Breastfeeding is not recommended during treatment. Topical Steroids Applications Pregnancy And Lactation Text: Most topical steroids are considered safe to use during pregnancy and lactation.  Any topical steroid applied to the breast or nipple should be washed off before breastfeeding. Soolantra Pregnancy And Lactation Text: This medication is Pregnancy Category C. This medication is considered safe during breast feeding. Low Dose Naltrexone Counseling- I discussed with the patient the potential risks and side effects of low dose naltrexone including but not limited to: more vivid dreams, headaches, nausea, vomiting, abdominal pain, fatigue, dizziness, and anxiety. Low Dose Naltrexone Pregnancy And Lactation Text: Naltrexone is pregnancy category C.  There have been no adequate and well-controlled studies in pregnant women.  It should be used in pregnancy only if the potential benefit justifies the potential risk to the fetus.   Limited data indicates that naltrexone is minimally excreted into breastmilk. Sotyktu Pregnancy And Lactation Text: There is insufficient data to evaluate whether or not Sotyktu is safe to use during pregnancy.? ?It is not known if Sotyktu passes into breast milk and whether or not it is safe to use when breastfeeding.?? VTAMA Counseling: I discussed with the patient that VTAMA is not for use in the eyes, mouth or mouth. They should call the office if they develop any signs of allergic reactions to VTAMA. The patient verbalized understanding of the proper use and possible adverse effects of VTAMA.  All of the patient's questions and concerns were addressed. Sotyktu Counseling:  I discussed the most common side effects of Sotyktu including: common cold, sore throat, sinus infections, cold sores, canker sores, folliculitis, and acne.? I also discussed more serious side effects of Sotyktu including but not limited to: serious allergic reactions; increased risk for infections such as TB; cancers such as lymphomas; rhabdomyolysis and elevated CPK; and elevated triglycerides and liver enzymes.? Olanzapine Counseling- I discussed with the patient the common side effects of olanzapine including but are not limited to: lack of energy, dry mouth, increased appetite, sleepiness, tremor, constipation, dizziness, changes in behavior, or restlessness.  Explained that teenagers are more likely to experience headaches, abdominal pain, pain in the arms or legs, tiredness, and sleepiness.  Serious side effects include but are not limited: increased risk of death in elderly patients who are confused, have memory loss, or dementia-related psychosis; hyperglycemia; increased cholesterol and triglycerides; and weight gain. Vtama Pregnancy And Lactation Text: It is unknown if this medication can cause problems during pregnancy and breastfeeding. Topical Metronidazole Counseling: Metronidazole is a topical antibiotic medication. You may experience burning, stinging, redness, or allergic reactions.  Please call our office if you develop any problems from using this medication. Olanzapine Pregnancy And Lactation Text: This medication is pregnancy category C.   There are no adequate and well controlled trials with olanzapine in pregnant females.  Olanzapine should be used during pregnancy only if the potential benefit justifies the potential risk to the fetus.   In a study in lactating healthy women, olanzapine was excreted in breast milk.  It is recommended that women taking olanzapine should not breast feed. Calcipotriene Counseling:  I discussed with the patient the risks of calcipotriene including but not limited to erythema, scaling, itching, and irritation. Zoryve Counseling:  I discussed with the patient that Zoryve is not for use in the eyes, mouth or vagina. The most commonly reported side effects include diarrhea, headache, insomnia, application site pain, upper respiratory tract infections, and urinary tract infections.  All of the patient's questions and concerns were addressed. Topical Metronidazole Pregnancy And Lactation Text: This medication is Pregnancy Category B and considered safe during pregnancy.  It is also considered safe to use while breastfeeding. Calcipotriene Pregnancy And Lactation Text: The use of this medication during pregnancy or lactation is not recommended as there is insufficient data. Cantharidin Counseling:  I discussed with the patient the risks of Cantharidin including but not limited to pain, redness, burning, itching, and blistering. Topical Steroids Counseling: I discussed with the patient that prolonged use of topical steroids can result in the increased appearance of superficial blood vessels (telangiectasias), lightening (hypopigmentation) and thinning of the skin (atrophy).  Patient understands to avoid using high potency steroids in skin folds, the groin or the face.  The patient verbalized understanding of the proper use and possible adverse effects of topical steroids.  All of the patient's questions and concerns were addressed. Cantharidin Pregnancy And Lactation Text: This medication has not been proven safe during pregnancy. It is unknown if this medication is excreted in breast milk. Dutasteride Female Counseling: Dutasteride Counseling:  I discussed with the patient the risks of use of dutasteride including but not limited to decreased libido and sexual dysfunction. Explained the teratogenic nature of the medication and stressed the importance of not getting pregnant during treatment. All of the patient's questions and concerns were addressed. Finasteride Female Counseling: Finasteride Counseling:  I discussed with the patient the risks of use of finasteride including but not limited to decreased libido and sexual dysfunction. Explained the teratogenic nature of the medication and stressed the importance of not getting pregnant during treatment. All of the patient's questions and concerns were addressed. Bimzelx Counseling:  I discussed with the patient the risks of Bimzelx including but not limited to depression, immunosuppression, allergic reactions and infections.  The patient understands that monitoring is required including a PPD at baseline and must alert us or the primary physician if symptoms of infection or other concerning signs are noted. Bimzelx Pregnancy And Lactation Text: This medication crosses the placenta and the safety is uncertain during pregnancy. It is unknown if this medication is present in breast milk. Hyrimoz Counseling:  I discussed with the patient the risks of adalimumab including but not limited to myelosuppression, immunosuppression, autoimmune hepatitis, demyelinating diseases, lymphoma, and serious infections.  The patient understands that monitoring is required including a PPD at baseline and must alert us or the primary physician if symptoms of infection or other concerning signs are noted.

## 2025-03-18 NOTE — ED PROVIDER NOTE - OBJECTIVE STATEMENT
44-year-old female past medical history of substance use disorder, ADHD, Bipolar Disorder, and OA presenting for medication refill.  Patient states that she is scheduled to see her physician who gives her Suboxone tomorrow but needs it today and was told to come to the ED to get her dose.  Patient states she also has generalized sinus discomfort, throat itching for the last several days.  Patient states she usually gets Decadron which relieves her symptoms and is requesting Decadron at this time.  Denies any other complaints including fevers, chills, nausea, vomiting, chest pain, shortness of breath, headache, dizziness.

## 2025-03-18 NOTE — ED PROVIDER NOTE - PROGRESS NOTE DETAILS
MS–shared decision making, patient agreeable to take Decadron and Suboxone in the ED and will follow-up with her regular doctor tomorrow for her refill of Suboxone.

## 2025-03-18 NOTE — ED PROVIDER NOTE - PATIENT PORTAL LINK FT
You can access the FollowMyHealth Patient Portal offered by [facility] by registering at the following website: [email or web address]. By joining [employer]’s FollowMyHealth portal, you will also be able to view your health information using other applications (apps) compatible with our system.

## 2025-03-18 NOTE — ED PROVIDER NOTE - ATTENDING CONTRIBUTION TO CARE
I have personally performed a history and physical exam on this patient and personally directed the management of the patient. Patient is a 44-year-old female presents for evaluation of cough cold congestion and sore throat denies any chest pain shortness of breath fevers chills vomiting diarrhea patient is requesting Decadron here as she notes that the only medicine that helps her denies any other symptoms at this time patient also requesting a dose of Suboxone as she unable to take her usual dose however she is seeing her PCP tomorrow requesting 1 dose here    On physical exam patient is normocephalic atraumatic pupils equally round react light, extract nontender nondistended bowel sounds positive extremities well range of motion radial pulse 2+ pedal pulse 2+    Assessment plan patient presents for evaluation sore throat  requesting Decadron and a dose of Suboxone.  Patient's physical exam is unremarkable for range of motion of patient given DuoNeb's and discharged advised to follow-up with PCP next week for 48 hours

## 2025-03-22 ENCOUNTER — EMERGENCY (EMERGENCY)
Facility: HOSPITAL | Age: 45
LOS: 0 days | Discharge: HOME | End: 2025-03-22
Attending: EMERGENCY MEDICINE
Payer: MEDICAID

## 2025-03-22 VITALS
HEART RATE: 119 BPM | HEIGHT: 63 IN | DIASTOLIC BLOOD PRESSURE: 72 MMHG | RESPIRATION RATE: 18 BRPM | TEMPERATURE: 98 F | OXYGEN SATURATION: 100 % | SYSTOLIC BLOOD PRESSURE: 102 MMHG

## 2025-03-22 VITALS — WEIGHT: 160.06 LBS

## 2025-03-22 DIAGNOSIS — Z98.890 OTHER SPECIFIED POSTPROCEDURAL STATES: Chronic | ICD-10-CM

## 2025-03-22 DIAGNOSIS — K52.9 NONINFECTIVE GASTROENTERITIS AND COLITIS, UNSPECIFIED: ICD-10-CM

## 2025-03-22 DIAGNOSIS — R63.8 OTHER SYMPTOMS AND SIGNS CONCERNING FOOD AND FLUID INTAKE: ICD-10-CM

## 2025-03-22 DIAGNOSIS — Z33.2 ENCOUNTER FOR ELECTIVE TERMINATION OF PREGNANCY: Chronic | ICD-10-CM

## 2025-03-22 DIAGNOSIS — F17.200 NICOTINE DEPENDENCE, UNSPECIFIED, UNCOMPLICATED: ICD-10-CM

## 2025-03-22 DIAGNOSIS — Z98.891 HISTORY OF UTERINE SCAR FROM PREVIOUS SURGERY: Chronic | ICD-10-CM

## 2025-03-22 DIAGNOSIS — R11.2 NAUSEA WITH VOMITING, UNSPECIFIED: ICD-10-CM

## 2025-03-22 LAB
ALBUMIN SERPL ELPH-MCNC: 4.5 G/DL — SIGNIFICANT CHANGE UP (ref 3.5–5.2)
ALP SERPL-CCNC: 63 U/L — SIGNIFICANT CHANGE UP (ref 30–115)
ALT FLD-CCNC: 13 U/L — SIGNIFICANT CHANGE UP (ref 0–41)
ANION GAP SERPL CALC-SCNC: 11 MMOL/L — SIGNIFICANT CHANGE UP (ref 7–14)
APPEARANCE UR: ABNORMAL
AST SERPL-CCNC: 19 U/L — SIGNIFICANT CHANGE UP (ref 0–41)
BASOPHILS # BLD AUTO: 0.04 K/UL — SIGNIFICANT CHANGE UP (ref 0–0.2)
BASOPHILS NFR BLD AUTO: 0.3 % — SIGNIFICANT CHANGE UP (ref 0–1)
BILIRUB SERPL-MCNC: 0.9 MG/DL — SIGNIFICANT CHANGE UP (ref 0.2–1.2)
BILIRUB UR-MCNC: NEGATIVE — SIGNIFICANT CHANGE UP
BUN SERPL-MCNC: 8 MG/DL — LOW (ref 10–20)
CALCIUM SERPL-MCNC: 9.6 MG/DL — SIGNIFICANT CHANGE UP (ref 8.4–10.5)
CHLORIDE SERPL-SCNC: 101 MMOL/L — SIGNIFICANT CHANGE UP (ref 98–110)
CO2 SERPL-SCNC: 26 MMOL/L — SIGNIFICANT CHANGE UP (ref 17–32)
COLOR SPEC: YELLOW — SIGNIFICANT CHANGE UP
CREAT SERPL-MCNC: 0.8 MG/DL — SIGNIFICANT CHANGE UP (ref 0.7–1.5)
DIFF PNL FLD: NEGATIVE — SIGNIFICANT CHANGE UP
EGFR: 92 ML/MIN/1.73M2 — SIGNIFICANT CHANGE UP
EGFR: 92 ML/MIN/1.73M2 — SIGNIFICANT CHANGE UP
EOSINOPHIL # BLD AUTO: 0.3 K/UL — SIGNIFICANT CHANGE UP (ref 0–0.7)
EOSINOPHIL NFR BLD AUTO: 2.5 % — SIGNIFICANT CHANGE UP (ref 0–8)
GLUCOSE SERPL-MCNC: 85 MG/DL — SIGNIFICANT CHANGE UP (ref 70–99)
GLUCOSE UR QL: NEGATIVE MG/DL — SIGNIFICANT CHANGE UP
HCG SERPL QL: NEGATIVE — SIGNIFICANT CHANGE UP
HCT VFR BLD CALC: 41.3 % — SIGNIFICANT CHANGE UP (ref 37–47)
HGB BLD-MCNC: 14.1 G/DL — SIGNIFICANT CHANGE UP (ref 12–16)
IMM GRANULOCYTES NFR BLD AUTO: 0.3 % — SIGNIFICANT CHANGE UP (ref 0.1–0.3)
KETONES UR-MCNC: NEGATIVE MG/DL — SIGNIFICANT CHANGE UP
LEUKOCYTE ESTERASE UR-ACNC: NEGATIVE — SIGNIFICANT CHANGE UP
LIDOCAIN IGE QN: 28 U/L — SIGNIFICANT CHANGE UP (ref 7–60)
LYMPHOCYTES # BLD AUTO: 2.75 K/UL — SIGNIFICANT CHANGE UP (ref 1.2–3.4)
LYMPHOCYTES # BLD AUTO: 23.1 % — SIGNIFICANT CHANGE UP (ref 20.5–51.1)
MCHC RBC-ENTMCNC: 31.2 PG — HIGH (ref 27–31)
MCHC RBC-ENTMCNC: 34.1 G/DL — SIGNIFICANT CHANGE UP (ref 32–37)
MCV RBC AUTO: 91.4 FL — SIGNIFICANT CHANGE UP (ref 81–99)
MONOCYTES # BLD AUTO: 1.26 K/UL — HIGH (ref 0.1–0.6)
MONOCYTES NFR BLD AUTO: 10.6 % — HIGH (ref 1.7–9.3)
NEUTROPHILS # BLD AUTO: 7.51 K/UL — HIGH (ref 1.4–6.5)
NEUTROPHILS NFR BLD AUTO: 63.2 % — SIGNIFICANT CHANGE UP (ref 42.2–75.2)
NITRITE UR-MCNC: NEGATIVE — SIGNIFICANT CHANGE UP
NRBC BLD AUTO-RTO: 0 /100 WBCS — SIGNIFICANT CHANGE UP (ref 0–0)
PH UR: 6.5 — SIGNIFICANT CHANGE UP (ref 5–8)
PLATELET # BLD AUTO: 291 K/UL — SIGNIFICANT CHANGE UP (ref 130–400)
PMV BLD: 11.3 FL — HIGH (ref 7.4–10.4)
POTASSIUM SERPL-MCNC: 4.4 MMOL/L — SIGNIFICANT CHANGE UP (ref 3.5–5)
POTASSIUM SERPL-SCNC: 4.4 MMOL/L — SIGNIFICANT CHANGE UP (ref 3.5–5)
PROT SERPL-MCNC: 6.8 G/DL — SIGNIFICANT CHANGE UP (ref 6–8)
PROT UR-MCNC: SIGNIFICANT CHANGE UP MG/DL
RBC # BLD: 4.52 M/UL — SIGNIFICANT CHANGE UP (ref 4.2–5.4)
RBC # FLD: 13.5 % — SIGNIFICANT CHANGE UP (ref 11.5–14.5)
SODIUM SERPL-SCNC: 138 MMOL/L — SIGNIFICANT CHANGE UP (ref 135–146)
SP GR SPEC: 1.01 — SIGNIFICANT CHANGE UP (ref 1–1.03)
UROBILINOGEN FLD QL: 0.2 MG/DL — SIGNIFICANT CHANGE UP (ref 0.2–1)
WBC # BLD: 11.89 K/UL — HIGH (ref 4.8–10.8)
WBC # FLD AUTO: 11.89 K/UL — HIGH (ref 4.8–10.8)

## 2025-03-22 PROCEDURE — 99285 EMERGENCY DEPT VISIT HI MDM: CPT

## 2025-03-22 PROCEDURE — 96375 TX/PRO/DX INJ NEW DRUG ADDON: CPT

## 2025-03-22 PROCEDURE — 81001 URINALYSIS AUTO W/SCOPE: CPT

## 2025-03-22 PROCEDURE — 36415 COLL VENOUS BLD VENIPUNCTURE: CPT

## 2025-03-22 PROCEDURE — 74177 CT ABD & PELVIS W/CONTRAST: CPT | Mod: MC

## 2025-03-22 PROCEDURE — 83690 ASSAY OF LIPASE: CPT

## 2025-03-22 PROCEDURE — 96376 TX/PRO/DX INJ SAME DRUG ADON: CPT

## 2025-03-22 PROCEDURE — 96374 THER/PROPH/DIAG INJ IV PUSH: CPT | Mod: XU

## 2025-03-22 PROCEDURE — 85025 COMPLETE CBC W/AUTO DIFF WBC: CPT

## 2025-03-22 PROCEDURE — 84703 CHORIONIC GONADOTROPIN ASSAY: CPT

## 2025-03-22 PROCEDURE — 74177 CT ABD & PELVIS W/CONTRAST: CPT | Mod: 26

## 2025-03-22 PROCEDURE — 99284 EMERGENCY DEPT VISIT MOD MDM: CPT | Mod: 25

## 2025-03-22 PROCEDURE — 80053 COMPREHEN METABOLIC PANEL: CPT

## 2025-03-22 RX ORDER — KETOROLAC TROMETHAMINE 30 MG/ML
15 INJECTION, SOLUTION INTRAMUSCULAR; INTRAVENOUS ONCE
Refills: 0 | Status: DISCONTINUED | OUTPATIENT
Start: 2025-03-22 | End: 2025-03-22

## 2025-03-22 RX ORDER — ONDANSETRON HCL/PF 4 MG/2 ML
4 VIAL (ML) INJECTION ONCE
Refills: 0 | Status: COMPLETED | OUTPATIENT
Start: 2025-03-22 | End: 2025-03-22

## 2025-03-22 RX ORDER — ACETAMINOPHEN 500 MG/5ML
975 LIQUID (ML) ORAL ONCE
Refills: 0 | Status: COMPLETED | OUTPATIENT
Start: 2025-03-22 | End: 2025-03-22

## 2025-03-22 RX ORDER — AMOXICILLIN AND CLAVULANATE POTASSIUM 500; 125 MG/1; MG/1
875 TABLET, FILM COATED ORAL
Qty: 20 | Refills: 0
Start: 2025-03-22 | End: 2025-03-31

## 2025-03-22 RX ADMIN — Medication 1000 MILLILITER(S): at 04:00

## 2025-03-22 RX ADMIN — KETOROLAC TROMETHAMINE 15 MILLIGRAM(S): 30 INJECTION, SOLUTION INTRAMUSCULAR; INTRAVENOUS at 04:00

## 2025-03-22 RX ADMIN — Medication 20 MILLIGRAM(S): at 04:00

## 2025-03-22 RX ADMIN — KETOROLAC TROMETHAMINE 15 MILLIGRAM(S): 30 INJECTION, SOLUTION INTRAMUSCULAR; INTRAVENOUS at 05:15

## 2025-03-22 RX ADMIN — Medication 4 MILLIGRAM(S): at 04:08

## 2025-03-22 RX ADMIN — Medication 975 MILLIGRAM(S): at 05:15

## 2025-03-22 NOTE — ED PROVIDER NOTE - PHYSICAL EXAMINATION
VITAL SIGNS: I have reviewed nursing notes and confirm.  CONSTITUTIONAL: uncomfortable, but non-toxic  SKIN: Skin exam is warm and dry, no acute rash.  HEAD: Normocephalic; atraumatic.  EYES: PERRL, EOM intact; conjunctiva and sclera clear.  ENT: airway clear.   NECK: Supple  CARD: S1, S2 normal; no murmurs, gallops, or rubs. Regular rate and rhythm.  RESP: No wheezes, rales or rhonchi.  ABD: Normal bowel sounds; soft; non-distended; left sided abdominal tenderness, +left CVA. no overlying erythema or rash  EXT: Normal ROM.   NEURO: Alert, oriented.   PSYCH: Cooperative, appropriate.

## 2025-03-22 NOTE — ED PROVIDER NOTE - CLINICAL SUMMARY MEDICAL DECISION MAKING FREE TEXT BOX
44-year-old female, PMH of substance use disorder, ADHD, bipolar disorder, s/p appendectomy, presents to ED with complaints of abdominal pain.  Patient states the pain started 3 days ago, started on the right side of her abdomen.  it has since moved to the left side.  Associated with nausea, few episodes of nonbilious nonbloody vomiting yesterday, and decreased p.o. intake.  Patient states she has not had food in the last few days because of the pain.  No diarrhea, last bowel movement was 2 days ago.  LMP was at the end of February, states that she has been irregular since removing IUD a few years ago.  No vaginal bleeding or discharge.  No dysuria, hematuria or difficulty urinating.  No fevers. On exam, pt in NAD, AAOx3, head NC/AT, CN II-XII intact, PEERL, EOMi, neck (-) midline tenderness, lungs CTA B/L, CV S1S2 regular, abdomen soft/(+) left sided discomfort/ND/(+)BS, back (+) left CVA soreness, ext (-) edema, motor 5/5x4, sensation intact, ambulating with steady gait. Work up reviewed. Pt has colitis. Will d/c with abx and GI follow up. Return precautions provided.

## 2025-03-22 NOTE — ED PROVIDER NOTE - OBJECTIVE STATEMENT
44-year-old female with history of substance use disorder, ADHD, bipolar disorder, status post appendectomy presents to ED with complaints of abdominal pain.  Patient states the pain started 3 days ago, started on the right side of her abdomen.  it has since moved to the left side.  Associated with nausea, few episodes of nonbilious nonbloody vomiting yesterday, and decreased p.o. intake.  Patient states she has not had food in the last few days because of the pain.  No diarrhea, last bowel movement was 2 days ago.  LMP was at the end of February, states that she has been irregular since removing IUD a few years ago.  No vaginal bleeding or discharge.  No dysuria, hematuria or difficulty urinating.  No fevers.

## 2025-03-22 NOTE — ED ADULT TRIAGE NOTE - PATIENT'S SEXUAL ORIENTATION
[facility]  FAMILY PRACTICE OFFICE VISIT    CHIEF COMPLAINT:    Chief Complaint   Patient presents with   • Office Visit   • ER F/U     face numbness       SUBJECTIVE:    Kevin Chaparro is a 45 year old male who presents today to discuss the following:    Facial numbness: Patient reports yesterday morning woke up at his typical time, 5am, and noticed a severe headache which is not unusual for him. Took an Excedrin migraine at that time and went back to sleep. When he reawakened around 8am he felt like his lip was \"puffed up\"; then noticed the lower lip and chin was feeling numb, similar to past novocain for dental procedures. He looked in the mirror and did see some visible swelling of the area. Did not notice any slurred words or other neurologic changes. He does report shortly before [place] he had a brief episode of similar symptoms in the upper left cheek and lip; that lasted for a day or so and resolved. Yesterday afternoon he went to the ED where basic labs and exam were unremarkable. Symptoms resolved around 8pm last night. Denies recent new foods, medications. Most recent episode was across the bilateral lower lip and chin, with left sided puffiness. Prior episode was left sided cheek and upper lip.    Cough: Ongoing since late November. Improving somewhat but still bothersome. Reports scratchy feeling in his chest.    REVIEW OF SYSTEMS:    A review of systems was performed and findings relevant to these symptoms are included in the HPI.     ALLERGIES:    ALLERGIES:  No Known Allergies    PROBLEM LIST:    Patient Active Problem List   Diagnosis   • Orchalgia   • Dyslipidemia   • Hypogonadotropic hypogonadism syndrome, male (CMS/HCC)   • Hypersomnia with sleep apnea   • Environmental allergies   • GERD without esophagitis   • Anxiety and depression   • Obesity (BMI 30-39.9)       MEDICATIONS:  Outpatient Medications Marked as Taking for the 1/11/22 encounter (Office Visit) with Russel  MD Jorge L   Medication Sig Dispense Refill   • pantoprazole (PROTONIX) 40 MG tablet Take 1 tablet by mouth daily. 90 tablet 1   • albuterol 108 (90 Base) MCG/ACT inhaler Inhale 2 puffs into the lungs every 4 hours as needed for Shortness of Breath or Wheezing. 1 each 0   • fexofenadine (Allegra Allergy) 180 MG tablet Take 1 tablet by mouth daily. 90 tablet 1          Past Medical History:   Diagnosis Date   • Dyslipidemia 3/14    low HDL   • Epididymitis    • Gross hematuria     S/p negative CT and cystoscopy   • Low testosterone in male 2015    has been seen by endocrinology   • Mood changes 2014   • NILTON (obstructive sleep apnea) 07/2015    follows with Dr. Fofana-started on CPAP 11/15   • Seasonal allergies    • Vitamin D deficiency 03/2014    level is 16.5. 8/18-18.4     Past Surgical History:   Procedure Laterality Date   • Hb cystoscopy     • Nasal septum surgery  2010   • Sinus surgery  01/2018    done with [facility]   • Vasectomy  2009     Social History     Tobacco Use   • Smoking status: Never Smoker   • Smokeless tobacco: Never Used   Substance Use Topics   • Alcohol use: Yes     Alcohol/week: 0.0 standard drinks     Comment: social   • Drug use: No          OBJECTIVE:    PHYSICAL EXAM:    Visit Vitals  /82 (BP Location: LUE - Left upper extremity, Patient Position: Sitting, Cuff Size: Large Adult)   Pulse 87   Temp 98 °F (36.7 °C) (Oral)   Resp 18   Ht 6' 2\" (1.88 m)   Wt (!) 136.1 kg (300 lb 0.7 oz)   SpO2 99%   BMI 38.52 kg/m²      Nursing note reviewed.   Vital signs reviewed.    Physical Exam  Vitals reviewed.   Constitutional:       General: He is not in acute distress.  HENT:      Head: Normocephalic and atraumatic.      Right Ear: External ear normal.      Left Ear: External ear normal.      Nose: Nose normal.      Mouth/Throat:      Lips: No lesions.      Mouth: Mucous membranes are moist.      Dentition: No dental tenderness, dental abscesses or gum lesions.      Tongue: No lesions. Tongue  does not deviate from midline.      Palate: No mass and lesions.      Pharynx: Oropharynx is clear.   Eyes:      Extraocular Movements: Extraocular movements intact.      Conjunctiva/sclera: Conjunctivae normal.      Pupils: Pupils are equal, round, and reactive to light.   Cardiovascular:      Rate and Rhythm: Normal rate and regular rhythm.   Pulmonary:      Effort: Pulmonary effort is normal.      Breath sounds: Normal breath sounds.   Abdominal:      General: Abdomen is flat.   Musculoskeletal:         General: Normal range of motion.   Neurological:      General: No focal deficit present.      Mental Status: He is alert. Mental status is at baseline.      Cranial Nerves: Cranial nerves are intact.      Sensory: Sensation is intact.      Motor: Motor function is intact.      LABS / IMAGING:    Imaging reviewed.  MRI brain 2018:     IMPRESSION:  1. There is a stable 5 x 2 area of hypoenhancement within the posterior  aspect of the pituitary gland which could represent either a microadenoma  or a developmental cyst such as a Rathke's cleft cyst or pars intermedia  cyst.     2. There is diffuse enhancement and thickening involving the mucosal the  nasal cavity.    Results for orders placed in visit on 12/03/21    XR CHEST PA AND LATERAL    Narrative  EXAM: XR CHEST PA AND LATERAL    EXAM DATE: 12/3/2021 1:23 PM.    HISTORY: Other specified cough.    COMPARISON: Chest x-ray 11/28/2020 and 12/4/2014.    FINDINGS:  The lungs are normally aerated. The cardiomediastinal silhouette  is within normal limits. There is no pleural effusion or pneumothorax. No  acute osseous abnormalities.    Impression  1. No acute cardiopulmonary findings.    All pertinent laboratory results were reviewed.    ASSESSMENT / PLAN:  1. Facial numbness  Resolved; etiology unclear, pure neurologic findings confounded by presence of concomitant edema per patient. Differential may include allergic etiology, or neurologic etiology such as brain or  nerve mass, MS if there was no true swelling. Prior MRI findings of pituitary mass would not seem to explain current distribution of symptoms. If symptoms recur, would obtain MRI brain. ED precautions given.  2. Cough  Improving, most likely lingering lung irritation after prior infection; doubt relationship to episodic numbness. Conservative management.  3. GERD without esophagitis  Doing well with PPI. Refill.  -     pantoprazole (PROTONIX) 40 MG tablet; Take 1 tablet by mouth daily.  Dispense: 90 tablet; Refill: 1      FOLLOW-UP:  Return if symptoms worsen or fail to improve.    The patient indicated understanding of the diagnosis and agreed with the plan of care.    Russel Coats MD  1/12/2022  7:38 AM   Heterosexual

## 2025-03-22 NOTE — ED PROVIDER NOTE - ATTENDING CONTRIBUTION TO CARE
44-year-old female, PMH of substance use disorder, ADHD, bipolar disorder, s/p appendectomy, presents to ED with complaints of abdominal pain.  Patient states the pain started 3 days ago, started on the right side of her abdomen.  it has since moved to the left side.  Associated with nausea, few episodes of nonbilious nonbloody vomiting yesterday, and decreased p.o. intake.  Patient states she has not had food in the last few days because of the pain.  No diarrhea, last bowel movement was 2 days ago.  LMP was at the end of February, states that she has been irregular since removing IUD a few years ago.  No vaginal bleeding or discharge.  No dysuria, hematuria or difficulty urinating.  No fevers. On exam, pt in NAD, AAOx3, head NC/AT, CN II-XII intact, PEERL, EOMi, neck (-) midline tenderness, lungs CTA B/L, CV S1S2 regular, abdomen soft/(+) left sided discomfort/ND/(+)BS, back (+) left CVA soreness, ext (-) edema, motor 5/5x4, sensation intact, ambulating with steady gait. Will do labs, CT/XR and reevaluate. 44-year-old female, PMH of substance use disorder, ADHD, bipolar disorder, s/p appendectomy, presents to ED with complaints of abdominal pain.  Patient states the pain started 3 days ago, started on the right side of her abdomen.  it has since moved to the left side.  Associated with nausea, few episodes of nonbilious nonbloody vomiting yesterday, and decreased p.o. intake.  Patient states she has not had food in the last few days because of the pain.  No diarrhea, last bowel movement was 2 days ago.  LMP was at the end of February, states that she has been irregular since removing IUD a few years ago.  No vaginal bleeding or discharge.  No dysuria, hematuria or difficulty urinating.  No fevers. On exam, pt in NAD, AAOx3, head NC/AT, CN II-XII intact, PEERL, EOMi, neck (-) midline tenderness, lungs CTA B/L, CV S1S2 regular, abdomen soft/(+) left sided discomfort/ND/(+)BS, back (+) left CVA soreness, ext (-) edema, motor 5/5x4, sensation intact, ambulating with steady gait. Will do labs, CT and reevaluate.

## 2025-03-22 NOTE — ED ADULT NURSE NOTE - NS_SISCREENINGSR_GEN_ALL_ED
Medication: carvedilol passed protocol.   Last office visit date: 2/16/24  Next appointment scheduled?: Yes   Number of refills given: 1    Medication: amlodipine passed protocol.   Last office visit date: 2/16/24  Next appointment scheduled?: Yes   Number of refills given: 3    
Negative

## 2025-03-25 ENCOUNTER — APPOINTMENT (OUTPATIENT)
Dept: PSYCHIATRY | Facility: CLINIC | Age: 45
End: 2025-03-25

## 2025-03-27 ENCOUNTER — APPOINTMENT (OUTPATIENT)
Dept: PSYCHIATRY | Facility: CLINIC | Age: 45
End: 2025-03-27

## 2025-05-13 NOTE — ED ADULT TRIAGE NOTE - HEIGHT IN FEET
Medicare Preventive Visit Patient Instructions  Thank you for completing your Welcome to Medicare Visit or Medicare Annual Wellness Visit today. Your next wellness visit will be due in one year (5/14/2026).  The screening/preventive services that you may require over the next 5-10 years are detailed below. Some tests may not apply to you based off risk factors and/or age. Screening tests ordered at today's visit but not completed yet may show as past due. Also, please note that scanned in results may not display below.  Preventive Screenings:  Service Recommendations Previous Testing/Comments   Colorectal Cancer Screening  Colonoscopy    Fecal Occult Blood Test (FOBT)/Fecal Immunochemical Test (FIT)  Fecal DNA/Cologuard Test  Flexible Sigmoidoscopy Age: 45-75 years old   Colonoscopy: every 10 years (May be performed more frequently if at higher risk)  OR  FOBT/FIT: every 1 year  OR  Cologuard: every 3 years  OR  Sigmoidoscopy: every 5 years  Screening may be recommended earlier than age 45 if at higher risk for colorectal cancer. Also, an individualized decision between you and your healthcare provider will decide whether screening between the ages of 76-85 would be appropriate. Colonoscopy: 03/23/2018  FOBT/FIT: Not on file  Cologuard: Not on file  Sigmoidoscopy: Not on file    Screening Current     Prostate Cancer Screening Individualized decision between patient and health care provider in men between ages of 55-69   Medicare will cover every 12 months beginning on the day after your 50th birthday PSA: No results in last 5 years     Risks and Benefits Discussed  Due for PSA     Hepatitis C Screening Once for adults born between 1945 and 1965  More frequently in patients at high risk for Hepatitis C Hep C Antibody: 12/31/2019    Screening Current   Diabetes Screening 1-2 times per year if you're at risk for diabetes or have pre-diabetes Fasting glucose: 99 mg/dL (4/29/2024)  A1C: 6.2 % (4/29/2024)  Screening Not  Indicated  History Diabetes  Due for Blood Glucose   Cholesterol Screening Once every 5 years if you don't have a lipid disorder. May order more often based on risk factors. Lipid panel: 04/29/2024  Screening Not Indicated  History Lipid Disorder  Due for Lipid Panel      Other Preventive Screenings Covered by Medicare:  Abdominal Aortic Aneurysm (AAA) Screening: covered once if your at risk. You're considered to be at risk if you have a family history of AAA or a male between the age of 65-75 who smoking at least 100 cigarettes in your lifetime.  Lung Cancer Screening: covers low dose CT scan once per year if you meet all of the following conditions: (1) Age 55-77; (2) No signs or symptoms of lung cancer; (3) Current smoker or have quit smoking within the last 15 years; (4) You have a tobacco smoking history of at least 20 pack years (packs per day x number of years you smoked); (5) You get a written order from a healthcare provider.  Glaucoma Screening: covered annually if you're considered high risk: (1) You have diabetes OR (2) Family history of glaucoma OR (3)  aged 50 and older OR (4)  American aged 65 and older  Osteoporosis Screening: covered every 2 years if you meet one of the following conditions: (1) Have a vertebral abnormality; (2) On glucocorticoid therapy for more than 3 months; (3) Have primary hyperparathyroidism; (4) On osteoporosis medications and need to assess response to drug therapy.  HIV Screening: covered annually if you're between the age of 15-65. Also covered annually if you are younger than 15 and older than 65 with risk factors for HIV infection. For pregnant patients, it is covered up to 3 times per pregnancy.    Immunizations:  Immunization Recommendations   Influenza Vaccine Annual influenza vaccination during flu season is recommended for all persons aged >= 6 months who do not have contraindications   Pneumococcal Vaccine   * Pneumococcal conjugate vaccine  = PCV13 (Prevnar 13), PCV15 (Vaxneuvance), PCV20 (Prevnar 20)  * Pneumococcal polysaccharide vaccine = PPSV23 (Pneumovax) Adults 19-65 yo with certain risk factors or if 65+ yo  If never received any pneumonia vaccine: recommend Prevnar 20 (PCV20)  Give PCV20 if previously received 1 dose of PCV13 or PPSV23   Hepatitis B Vaccine 3 dose series if at intermediate or high risk (ex: diabetes, end stage renal disease, liver disease)   Respiratory syncytial virus (RSV) Vaccine - COVERED BY MEDICARE PART D  * RSVPreF3 (Arexvy) CDC recommends that adults 60 years of age and older may receive a single dose of RSV vaccine using shared clinical decision-making (SCDM)   Tetanus (Td) Vaccine - COST NOT COVERED BY MEDICARE PART B Following completion of primary series, a booster dose should be given every 10 years to maintain immunity against tetanus. Td may also be given as tetanus wound prophylaxis.   Tdap Vaccine - COST NOT COVERED BY MEDICARE PART B Recommended at least once for all adults. For pregnant patients, recommended with each pregnancy.   Shingles Vaccine (Shingrix) - COST NOT COVERED BY MEDICARE PART B  2 shot series recommended in those 19 years and older who have or will have weakened immune systems or those 50 years and older     Health Maintenance Due:      Topic Date Due   • Colorectal Cancer Screening  03/23/2028   • Hepatitis C Screening  Completed     Immunizations Due:      Topic Date Due   • Pneumococcal Vaccine: 65+ Years (1 of 2 - PCV) Never done   • COVID-19 Vaccine (3 - 2024-25 season) 09/01/2024     Advance Directives   What are advance directives?  Advance directives are legal documents that state your wishes and plans for medical care. These plans are made ahead of time in case you lose your ability to make decisions for yourself. Advance directives can apply to any medical decision, such as the treatments you want, and if you want to donate organs.   What are the types of advance directives?  There  are many types of advance directives, and each state has rules about how to use them. You may choose a combination of any of the following:  Living will:  This is a written record of the treatment you want. You can also choose which treatments you do not want, which to limit, and which to stop at a certain time. This includes surgery, medicine, IV fluid, and tube feedings.   Durable power of  for healthcare (DPAHC):  This is a written record that states who you want to make healthcare choices for you when you are unable to make them for yourself. This person, called a proxy, is usually a family member or a friend. You may choose more than 1 proxy.  Do not resuscitate (DNR) order:  A DNR order is used in case your heart stops beating or you stop breathing. It is a request not to have certain forms of treatment, such as CPR. A DNR order may be included in other types of advance directives.  Medical directive:  This covers the care that you want if you are in a coma, near death, or unable to make decisions for yourself. You can list the treatments you want for each condition. Treatment may include pain medicine, surgery, blood transfusions, dialysis, IV or tube feedings, and a ventilator (breathing machine).  Values history:  This document has questions about your views, beliefs, and how you feel and think about life. This information can help others choose the care that you would choose.  Why are advance directives important?  An advance directive helps you control your care. Although spoken wishes may be used, it is better to have your wishes written down. Spoken wishes can be misunderstood, or not followed. Treatments may be given even if you do not want them. An advance directive may make it easier for your family to make difficult choices about your care.   Weight Management   Why it is important to manage your weight:  Being overweight increases your risk of health conditions such as heart disease, high  blood pressure, type 2 diabetes, and certain types of cancer. It can also increase your risk for osteoarthritis, sleep apnea, and other respiratory problems. Aim for a slow, steady weight loss. Even a small amount of weight loss can lower your risk of health problems.  How to lose weight safely:  A safe and healthy way to lose weight is to eat fewer calories and get regular exercise. You can lose up about 1 pound a week by decreasing the number of calories you eat by 500 calories each day.   Healthy meal plan for weight management:  A healthy meal plan includes a variety of foods, contains fewer calories, and helps you stay healthy. A healthy meal plan includes the following:  Eat whole-grain foods more often.  A healthy meal plan should contain fiber. Fiber is the part of grains, fruits, and vegetables that is not broken down by your body. Whole-grain foods are healthy and provide extra fiber in your diet. Some examples of whole-grain foods are whole-wheat breads and pastas, oatmeal, brown rice, and bulgur.  Eat a variety of vegetables every day.  Include dark, leafy greens such as spinach, kale, reanna greens, and mustard greens. Eat yellow and orange vegetables such as carrots, sweet potatoes, and winter squash.   Eat a variety of fruits every day.  Choose fresh or canned fruit (canned in its own juice or light syrup) instead of juice. Fruit juice has very little or no fiber.  Eat low-fat dairy foods.  Drink fat-free (skim) milk or 1% milk. Eat fat-free yogurt and low-fat cottage cheese. Try low-fat cheeses such as mozzarella and other reduced-fat cheeses.  Choose meat and other protein foods that are low in fat.  Choose beans or other legumes such as split peas or lentils. Choose fish, skinless poultry (chicken or turkey), or lean cuts of red meat (beef or pork). Before you cook meat or poultry, cut off any visible fat.   Use less fat and oil.  Try baking foods instead of frying them. Add less fat, such as  margarine, sour cream, regular salad dressing and mayonnaise to foods. Eat fewer high-fat foods. Some examples of high-fat foods include french fries, doughnuts, ice cream, and cakes.  Eat fewer sweets.  Limit foods and drinks that are high in sugar. This includes candy, cookies, regular soda, and sweetened drinks.  Exercise:  Exercise at least 30 minutes per day on most days of the week. Some examples of exercise include walking, biking, dancing, and swimming. You can also fit in more physical activity by taking the stairs instead of the elevator or parking farther away from stores. Ask your healthcare provider about the best exercise plan for you.      © Copyright [employer] 2018 Information is for End User's use only and may not be sold, redistributed or otherwise used for commercial purposes. All illustrations and images included in CareNotes® are the copyrighted property of [employer]M., Inc[employer] or [employer]     5

## 2025-05-21 NOTE — ASU PREOP CHECKLIST - PATIENT PROBLEMS/NEEDS
-on omeprazole 40 mg daily and carafate as needed for symptoms, last EGD was in 2022.  Reports a history of peptic ulcer disease as well as potential H. pylori  -recommend EGD at same time as colonoscopy, rule out persistent H pylori, Awan's, gastritis, esophagitis.   -anti-reflux diet and measures   -Continue PPI daily, Carafate as needed   -patient uses NSAIDs twice a day chronically for pain, would recommend trying to decrease the use of NSAIDs    Orders:    omeprazole (PriLOSEC) 40 MG capsule; Take 1 capsule (40 mg total) by mouth daily before breakfast    sucralfate (CARAFATE) 1 g tablet; Take 1 tablet (1 g total) by mouth 3 (three) times a day as needed (abdominal pain)    EGD; Future    
Reports having a positive balloon expulsion test in 2021 in [place].  -Will refer to pelvic floor therapy  Orders:    Ambulatory Referral to Physical Therapy; Future    Colonoscopy; Future    
Patient expressed no known problems or needs

## 2025-06-19 ENCOUNTER — EMERGENCY (EMERGENCY)
Facility: HOSPITAL | Age: 45
LOS: 0 days | Discharge: HOME | End: 2025-06-19
Attending: EMERGENCY MEDICINE
Payer: MEDICAID

## 2025-06-19 VITALS
RESPIRATION RATE: 20 BRPM | HEART RATE: 120 BPM | WEIGHT: 151.9 LBS | DIASTOLIC BLOOD PRESSURE: 76 MMHG | SYSTOLIC BLOOD PRESSURE: 111 MMHG | OXYGEN SATURATION: 97 % | TEMPERATURE: 98 F

## 2025-06-19 DIAGNOSIS — F90.9 ATTENTION-DEFICIT HYPERACTIVITY DISORDER, UNSPECIFIED TYPE: ICD-10-CM

## 2025-06-19 DIAGNOSIS — F31.9 BIPOLAR DISORDER, UNSPECIFIED: ICD-10-CM

## 2025-06-19 DIAGNOSIS — Z33.2 ENCOUNTER FOR ELECTIVE TERMINATION OF PREGNANCY: Chronic | ICD-10-CM

## 2025-06-19 DIAGNOSIS — Z98.890 OTHER SPECIFIED POSTPROCEDURAL STATES: Chronic | ICD-10-CM

## 2025-06-19 DIAGNOSIS — Z98.891 HISTORY OF UTERINE SCAR FROM PREVIOUS SURGERY: Chronic | ICD-10-CM

## 2025-06-19 DIAGNOSIS — F11.10 OPIOID ABUSE, UNCOMPLICATED: ICD-10-CM

## 2025-06-19 PROCEDURE — 93010 ELECTROCARDIOGRAM REPORT: CPT

## 2025-06-19 PROCEDURE — 99283 EMERGENCY DEPT VISIT LOW MDM: CPT | Mod: 25

## 2025-06-19 PROCEDURE — 93005 ELECTROCARDIOGRAM TRACING: CPT

## 2025-06-19 PROCEDURE — 99284 EMERGENCY DEPT VISIT MOD MDM: CPT

## 2025-06-19 RX ORDER — BUPRENORPHINE HYDROCHLORIDE, NALOXONE HYDROCHLORIDE 4; 1 MG/1; MG/1
1 FILM, SOLUBLE BUCCAL; SUBLINGUAL DAILY
Refills: 0 | Status: DISCONTINUED | OUTPATIENT
Start: 2025-06-19 | End: 2025-06-19

## 2025-06-19 RX ORDER — BUPRENORPHINE HYDROCHLORIDE, NALOXONE HYDROCHLORIDE 4; 1 MG/1; MG/1
1 FILM, SOLUBLE BUCCAL; SUBLINGUAL ONCE
Refills: 0 | Status: DISCONTINUED | OUTPATIENT
Start: 2025-06-19 | End: 2025-06-19

## 2025-06-19 RX ORDER — BUPRENORPHINE HYDROCHLORIDE, NALOXONE HYDROCHLORIDE 4; 1 MG/1; MG/1
2 FILM, SOLUBLE BUCCAL; SUBLINGUAL
Qty: 1 | Refills: 0
Start: 2025-06-19 | End: 2025-06-21

## 2025-06-19 RX ADMIN — BUPRENORPHINE HYDROCHLORIDE, NALOXONE HYDROCHLORIDE 1 TABLET(S): 4; 1 FILM, SOLUBLE BUCCAL; SUBLINGUAL at 08:28

## 2025-06-19 NOTE — ED PROVIDER NOTE - NSFOLLOWUPINSTRUCTIONS_ED_ALL_ED_FT
SEE YOUR DOCTOR IN THE NEXT 24-48 HOURS   RETURN FOR ANY FURTHER CONCERNS AT THIS TIME SEE YOUR DOCTOR IN THE NEXT 24-48 HOURS   RETURN FOR ANY FURTHER CONCERNS AT THIS TIME    Patient sent to Mental Health clinic upon discharge

## 2025-06-19 NOTE — ED PROVIDER NOTE - BIRTH SEX
Rx Refill Note  Requested Prescriptions     Pending Prescriptions Disp Refills    albuterol sulfate  (90 Base) MCG/ACT inhaler [Pharmacy Med Name: ALBUTEROL HFA 90 MCG INHALER] 6.7 g 1     Sig: INHALE 2 PUFFS BY MOUTH EVERY 4 HOURS AS NEEDED FOR WHEEZING      Last office visit with prescribing clinician: 3/21/2024   Last telemedicine visit with prescribing clinician: Visit date not found   Next office visit with prescribing clinician: Visit date not found                         Would you like a call back once the refill request has been completed: [] Yes [] No    If the office needs to give you a call back, can they leave a voicemail: [] Yes [] No    Krunal Garcia Rep  07/29/24, 09:13 EDT  
Female

## 2025-06-19 NOTE — ED PROVIDER NOTE - PHYSICAL EXAMINATION
GENERAL: Well-developed; well-nourished; in no acute distress. AO  SKIN: warm, well perfused  HEAD: Normocephalic; atraumatic.  EYES: no conjunctival erythema, ocular motions intact and appropriate  ENT: airway clear.  CARD: Regular rate and rhythm.   RESP: LCTAB; No wheezes or crackles  ABD: soft and nondistended. nontender  Upper EXT: moving b/l extrems, no tremors

## 2025-06-19 NOTE — ED PROVIDER NOTE - ATTENDING CONTRIBUTION TO CARE
Patient is a 44-year-old female presents for evaluation of medication refill patient taking Suboxone has not had any in 2 2 days patient's prescription ran out secondary to insurance reasons approximately 2 weeks prior denies any symptoms at this point denies any headache visual changes chest pain shortness of breath abdominal pain or back pain    On physical exam patient is well-appearing normocephalic atraumatic pupils equal round reactive to light commendation extraocular muscles intact oropharynx clear chest clear to auscultation bilaterally abdomen soft nontender nondistended bowel sounds positive no guarding rebound extremities full range of motion no focal deficits patient is able ambulate well    Assessment plan patient presents for evaluation of Suboxone refill patient given Suboxone here improved I will discharge with prescription given outpatient follow-up we discussed indications to return patient is aware additionally routine EKG per my depend evaluation not consistent with STEMI and axis with QT prolongation not consistent with arrhythmia patient showing no signs of opioid withdrawal at this point we will discharge

## 2025-06-19 NOTE — ED PROVIDER NOTE - OBJECTIVE STATEMENT
44-year-old female, past medical history of substance use disorder, ADHD, bipolar disorder, status post appendectomy, comes in for Suboxone.  Patient states that in December of last year, had surgery of her left foot, was on oxycodone for 3 months.  Follows with a therapist at [facility] where she has been getting Suboxone, latest dose 2 mg.  Due to medical insurance and timing of prescription, patient was not prescribed Suboxone from her therapist Shanice.

## 2025-06-19 NOTE — ED PROVIDER NOTE - PATIENT PORTAL LINK FT
You can access the FollowMyHealth Patient Portal offered by [facility] by registering at the following website: [email or web address]. By joining [facility]’s FollowMyHealth portal, you will also be able to view your health information using other applications (apps) compatible with our system.
